# Patient Record
Sex: MALE | Race: WHITE | HISPANIC OR LATINO | Employment: UNEMPLOYED | URBAN - METROPOLITAN AREA
[De-identification: names, ages, dates, MRNs, and addresses within clinical notes are randomized per-mention and may not be internally consistent; named-entity substitution may affect disease eponyms.]

---

## 2021-04-25 ENCOUNTER — APPOINTMENT (OUTPATIENT)
Dept: RADIOLOGY | Facility: MEDICAL CENTER | Age: 66
End: 2021-04-25
Attending: STUDENT IN AN ORGANIZED HEALTH CARE EDUCATION/TRAINING PROGRAM
Payer: COMMERCIAL

## 2021-04-25 ENCOUNTER — HOSPITAL ENCOUNTER (OUTPATIENT)
Facility: MEDICAL CENTER | Age: 66
End: 2021-05-13
Attending: EMERGENCY MEDICINE | Admitting: INTERNAL MEDICINE
Payer: COMMERCIAL

## 2021-04-25 ENCOUNTER — APPOINTMENT (OUTPATIENT)
Dept: RADIOLOGY | Facility: MEDICAL CENTER | Age: 66
End: 2021-04-25
Attending: EMERGENCY MEDICINE
Payer: COMMERCIAL

## 2021-04-25 DIAGNOSIS — L03.119 CELLULITIS OF LOWER EXTREMITY, UNSPECIFIED LATERALITY: ICD-10-CM

## 2021-04-25 DIAGNOSIS — G20.A1 PARKINSON DISEASE (HCC): ICD-10-CM

## 2021-04-25 DIAGNOSIS — M19.041 PRIMARY OSTEOARTHRITIS OF BOTH HANDS: ICD-10-CM

## 2021-04-25 DIAGNOSIS — M19.042 PRIMARY OSTEOARTHRITIS OF BOTH HANDS: ICD-10-CM

## 2021-04-25 DIAGNOSIS — I87.2 VENOUS INSUFFICIENCY OF BOTH LOWER EXTREMITIES: ICD-10-CM

## 2021-04-25 DIAGNOSIS — R62.7 FAILURE TO THRIVE IN ADULT: ICD-10-CM

## 2021-04-25 PROBLEM — M79.641 BILATERAL HAND PAIN: Status: ACTIVE | Noted: 2021-04-25

## 2021-04-25 PROBLEM — Z59.00 HOMELESS: Status: ACTIVE | Noted: 2021-04-25

## 2021-04-25 PROBLEM — M79.642 BILATERAL HAND PAIN: Status: ACTIVE | Noted: 2021-04-25

## 2021-04-25 PROBLEM — E11.9 DM (DIABETES MELLITUS) (HCC): Status: ACTIVE | Noted: 2021-04-25

## 2021-04-25 PROBLEM — I87.8 VENOUS STASIS: Status: ACTIVE | Noted: 2021-04-25

## 2021-04-25 LAB
ALBUMIN SERPL BCP-MCNC: 4.8 G/DL (ref 3.2–4.9)
ALBUMIN/GLOB SERPL: 1.3 G/DL
ALP SERPL-CCNC: 105 U/L (ref 30–99)
ALT SERPL-CCNC: 7 U/L (ref 2–50)
AMPHET UR QL SCN: NEGATIVE
ANION GAP SERPL CALC-SCNC: 11 MMOL/L (ref 7–16)
APPEARANCE UR: CLEAR
AST SERPL-CCNC: 24 U/L (ref 12–45)
B-OH-BUTYR SERPL-MCNC: 0.48 MMOL/L (ref 0.02–0.27)
BACTERIA #/AREA URNS HPF: NEGATIVE /HPF
BARBITURATES UR QL SCN: NEGATIVE
BASOPHILS # BLD AUTO: 1.2 % (ref 0–1.8)
BASOPHILS # BLD: 0.11 K/UL (ref 0–0.12)
BENZODIAZ UR QL SCN: NEGATIVE
BILIRUB SERPL-MCNC: 0.5 MG/DL (ref 0.1–1.5)
BILIRUB UR QL STRIP.AUTO: NEGATIVE
BUN SERPL-MCNC: 36 MG/DL (ref 8–22)
BZE UR QL SCN: NEGATIVE
CALCIUM SERPL-MCNC: 9.8 MG/DL (ref 8.5–10.5)
CANNABINOIDS UR QL SCN: NEGATIVE
CHLORIDE SERPL-SCNC: 96 MMOL/L (ref 96–112)
CO2 SERPL-SCNC: 27 MMOL/L (ref 20–33)
COLOR UR: YELLOW
CREAT SERPL-MCNC: 1.02 MG/DL (ref 0.5–1.4)
CRP SERPL HS-MCNC: <0.3 MG/DL (ref 0–0.75)
EKG IMPRESSION: NORMAL
EOSINOPHIL # BLD AUTO: 0.27 K/UL (ref 0–0.51)
EOSINOPHIL NFR BLD: 3 % (ref 0–6.9)
EPI CELLS #/AREA URNS HPF: NEGATIVE /HPF
ERYTHROCYTE [DISTWIDTH] IN BLOOD BY AUTOMATED COUNT: 41.1 FL (ref 35.9–50)
ERYTHROCYTE [SEDIMENTATION RATE] IN BLOOD BY WESTERGREN METHOD: 2 MM/HOUR (ref 0–20)
EST. AVERAGE GLUCOSE BLD GHB EST-MCNC: 148 MG/DL
ETHANOL BLD-MCNC: <10.1 MG/DL (ref 0–10)
GLOBULIN SER CALC-MCNC: 3.6 G/DL (ref 1.9–3.5)
GLUCOSE SERPL-MCNC: 117 MG/DL (ref 65–99)
GLUCOSE UR STRIP.AUTO-MCNC: NEGATIVE MG/DL
HBA1C MFR BLD: 6.8 % (ref 4–5.6)
HCT VFR BLD AUTO: 46.8 % (ref 42–52)
HGB BLD-MCNC: 15.2 G/DL (ref 14–18)
IMM GRANULOCYTES # BLD AUTO: 0.02 K/UL (ref 0–0.11)
IMM GRANULOCYTES NFR BLD AUTO: 0.2 % (ref 0–0.9)
KETONES UR STRIP.AUTO-MCNC: NEGATIVE MG/DL
LACTATE BLD-SCNC: 1.4 MMOL/L (ref 0.5–2)
LEUKOCYTE ESTERASE UR QL STRIP.AUTO: NEGATIVE
LYMPHOCYTES # BLD AUTO: 1.93 K/UL (ref 1–4.8)
LYMPHOCYTES NFR BLD: 21.8 % (ref 22–41)
MAGNESIUM SERPL-MCNC: 2.3 MG/DL (ref 1.5–2.5)
MCH RBC QN AUTO: 28.5 PG (ref 27–33)
MCHC RBC AUTO-ENTMCNC: 32.5 G/DL (ref 33.7–35.3)
MCV RBC AUTO: 87.6 FL (ref 81.4–97.8)
METHADONE UR QL SCN: NEGATIVE
MICRO URNS: ABNORMAL
MONOCYTES # BLD AUTO: 0.46 K/UL (ref 0–0.85)
MONOCYTES NFR BLD AUTO: 5.2 % (ref 0–13.4)
NEUTROPHILS # BLD AUTO: 6.07 K/UL (ref 1.82–7.42)
NEUTROPHILS NFR BLD: 68.6 % (ref 44–72)
NITRITE UR QL STRIP.AUTO: NEGATIVE
NRBC # BLD AUTO: 0 K/UL
NRBC BLD-RTO: 0 /100 WBC
NT-PROBNP SERPL IA-MCNC: 10 PG/ML (ref 0–125)
OPIATES UR QL SCN: NEGATIVE
OXYCODONE UR QL SCN: NEGATIVE
PCP UR QL SCN: NEGATIVE
PH UR STRIP.AUTO: 7 [PH] (ref 5–8)
PHOSPHATE SERPL-MCNC: 3.6 MG/DL (ref 2.5–4.5)
PLATELET # BLD AUTO: 177 K/UL (ref 164–446)
PMV BLD AUTO: 9.7 FL (ref 9–12.9)
POTASSIUM SERPL-SCNC: 4 MMOL/L (ref 3.6–5.5)
PROPOXYPH UR QL SCN: NEGATIVE
PROT SERPL-MCNC: 8.4 G/DL (ref 6–8.2)
PROT UR QL STRIP: 100 MG/DL
RBC # BLD AUTO: 5.34 M/UL (ref 4.7–6.1)
RBC # URNS HPF: ABNORMAL /HPF
RBC UR QL AUTO: NEGATIVE
SODIUM SERPL-SCNC: 134 MMOL/L (ref 135–145)
SP GR UR STRIP.AUTO: 1.02
TROPONIN T SERPL-MCNC: 21 NG/L (ref 6–19)
UROBILINOGEN UR STRIP.AUTO-MCNC: 0.2 MG/DL
WBC # BLD AUTO: 8.9 K/UL (ref 4.8–10.8)
WBC #/AREA URNS HPF: ABNORMAL /HPF

## 2021-04-25 PROCEDURE — 96375 TX/PRO/DX INJ NEW DRUG ADDON: CPT

## 2021-04-25 PROCEDURE — 83735 ASSAY OF MAGNESIUM: CPT

## 2021-04-25 PROCEDURE — 93005 ELECTROCARDIOGRAM TRACING: CPT

## 2021-04-25 PROCEDURE — 80307 DRUG TEST PRSMV CHEM ANLYZR: CPT

## 2021-04-25 PROCEDURE — 73130 X-RAY EXAM OF HAND: CPT | Mod: RT

## 2021-04-25 PROCEDURE — 83036 HEMOGLOBIN GLYCOSYLATED A1C: CPT

## 2021-04-25 PROCEDURE — G0378 HOSPITAL OBSERVATION PER HR: HCPCS

## 2021-04-25 PROCEDURE — 80053 COMPREHEN METABOLIC PANEL: CPT

## 2021-04-25 PROCEDURE — 93970 EXTREMITY STUDY: CPT

## 2021-04-25 PROCEDURE — 84100 ASSAY OF PHOSPHORUS: CPT

## 2021-04-25 PROCEDURE — 83880 ASSAY OF NATRIURETIC PEPTIDE: CPT

## 2021-04-25 PROCEDURE — 85025 COMPLETE CBC W/AUTO DIFF WBC: CPT

## 2021-04-25 PROCEDURE — U0003 INFECTIOUS AGENT DETECTION BY NUCLEIC ACID (DNA OR RNA); SEVERE ACUTE RESPIRATORY SYNDROME CORONAVIRUS 2 (SARS-COV-2) (CORONAVIRUS DISEASE [COVID-19]), AMPLIFIED PROBE TECHNIQUE, MAKING USE OF HIGH THROUGHPUT TECHNOLOGIES AS DESCRIBED BY CMS-2020-01-R: HCPCS

## 2021-04-25 PROCEDURE — 700105 HCHG RX REV CODE 258: Performed by: EMERGENCY MEDICINE

## 2021-04-25 PROCEDURE — 82077 ASSAY SPEC XCP UR&BREATH IA: CPT

## 2021-04-25 PROCEDURE — 87040 BLOOD CULTURE FOR BACTERIA: CPT

## 2021-04-25 PROCEDURE — 93005 ELECTROCARDIOGRAM TRACING: CPT | Performed by: EMERGENCY MEDICINE

## 2021-04-25 PROCEDURE — 99220 PR INITIAL OBSERVATION CARE,LEVL III: CPT | Mod: GC | Performed by: INTERNAL MEDICINE

## 2021-04-25 PROCEDURE — 83605 ASSAY OF LACTIC ACID: CPT

## 2021-04-25 PROCEDURE — 73130 X-RAY EXAM OF HAND: CPT | Mod: LT

## 2021-04-25 PROCEDURE — 99285 EMERGENCY DEPT VISIT HI MDM: CPT

## 2021-04-25 PROCEDURE — 700111 HCHG RX REV CODE 636 W/ 250 OVERRIDE (IP): Performed by: EMERGENCY MEDICINE

## 2021-04-25 PROCEDURE — 86140 C-REACTIVE PROTEIN: CPT

## 2021-04-25 PROCEDURE — 71045 X-RAY EXAM CHEST 1 VIEW: CPT

## 2021-04-25 PROCEDURE — U0005 INFEC AGEN DETEC AMPLI PROBE: HCPCS

## 2021-04-25 PROCEDURE — 81001 URINALYSIS AUTO W/SCOPE: CPT | Mod: XU

## 2021-04-25 PROCEDURE — 84484 ASSAY OF TROPONIN QUANT: CPT

## 2021-04-25 PROCEDURE — 700102 HCHG RX REV CODE 250 W/ 637 OVERRIDE(OP): Performed by: EMERGENCY MEDICINE

## 2021-04-25 PROCEDURE — 82010 KETONE BODYS QUAN: CPT

## 2021-04-25 PROCEDURE — 96365 THER/PROPH/DIAG IV INF INIT: CPT

## 2021-04-25 PROCEDURE — A9270 NON-COVERED ITEM OR SERVICE: HCPCS | Performed by: EMERGENCY MEDICINE

## 2021-04-25 PROCEDURE — 36415 COLL VENOUS BLD VENIPUNCTURE: CPT

## 2021-04-25 PROCEDURE — 85652 RBC SED RATE AUTOMATED: CPT

## 2021-04-25 RX ORDER — AMOXICILLIN 250 MG
2 CAPSULE ORAL 2 TIMES DAILY
Status: DISCONTINUED | OUTPATIENT
Start: 2021-04-25 | End: 2021-05-14 | Stop reason: HOSPADM

## 2021-04-25 RX ORDER — ASPIRIN 81 MG/1
81 TABLET ORAL DAILY
COMMUNITY
End: 2021-08-01

## 2021-04-25 RX ORDER — ROSUVASTATIN CALCIUM 20 MG/1
40 TABLET, COATED ORAL DAILY
COMMUNITY
End: 2021-08-01

## 2021-04-25 RX ORDER — ACETAMINOPHEN 325 MG/1
650 TABLET ORAL ONCE
Status: COMPLETED | OUTPATIENT
Start: 2021-04-25 | End: 2021-04-25

## 2021-04-25 RX ORDER — LABETALOL HYDROCHLORIDE 5 MG/ML
10 INJECTION, SOLUTION INTRAVENOUS EVERY 4 HOURS PRN
Status: DISCONTINUED | OUTPATIENT
Start: 2021-04-25 | End: 2021-05-09

## 2021-04-25 RX ORDER — TAMSULOSIN HYDROCHLORIDE 0.4 MG/1
0.4 CAPSULE ORAL DAILY
COMMUNITY
End: 2021-08-01

## 2021-04-25 RX ORDER — GABAPENTIN 300 MG/1
300 CAPSULE ORAL DAILY
COMMUNITY
End: 2021-08-01

## 2021-04-25 RX ORDER — POLYETHYLENE GLYCOL 3350 17 G/17G
1 POWDER, FOR SOLUTION ORAL
Status: DISCONTINUED | OUTPATIENT
Start: 2021-04-25 | End: 2021-05-14 | Stop reason: HOSPADM

## 2021-04-25 RX ORDER — BISACODYL 10 MG
10 SUPPOSITORY, RECTAL RECTAL
Status: DISCONTINUED | OUTPATIENT
Start: 2021-04-25 | End: 2021-05-14 | Stop reason: HOSPADM

## 2021-04-25 RX ORDER — SODIUM CHLORIDE, SODIUM LACTATE, POTASSIUM CHLORIDE, CALCIUM CHLORIDE 600; 310; 30; 20 MG/100ML; MG/100ML; MG/100ML; MG/100ML
1000 INJECTION, SOLUTION INTRAVENOUS ONCE
Status: COMPLETED | OUTPATIENT
Start: 2021-04-25 | End: 2021-04-25

## 2021-04-25 RX ORDER — ACETAMINOPHEN 325 MG/1
650 TABLET ORAL EVERY 6 HOURS PRN
Status: DISCONTINUED | OUTPATIENT
Start: 2021-04-25 | End: 2021-04-30

## 2021-04-25 RX ORDER — LORAZEPAM 2 MG/ML
1 INJECTION INTRAMUSCULAR ONCE
Status: DISCONTINUED | OUTPATIENT
Start: 2021-04-25 | End: 2021-04-25

## 2021-04-25 RX ORDER — VITAMIN B COMPLEX
5000 TABLET ORAL DAILY
Status: ON HOLD | COMMUNITY
End: 2021-05-13

## 2021-04-25 RX ORDER — CARBIDOPA/LEVODOPA 25MG-250MG
1 TABLET ORAL 4 TIMES DAILY
COMMUNITY
End: 2021-08-01

## 2021-04-25 RX ORDER — MELOXICAM 7.5 MG/1
7.5-15 TABLET ORAL
Status: ON HOLD | COMMUNITY
End: 2021-05-13

## 2021-04-25 RX ORDER — LORAZEPAM 2 MG/ML
1 INJECTION INTRAMUSCULAR ONCE
Status: COMPLETED | OUTPATIENT
Start: 2021-04-25 | End: 2021-04-25

## 2021-04-25 RX ORDER — DEXTROSE MONOHYDRATE 25 G/50ML
50 INJECTION, SOLUTION INTRAVENOUS
Status: DISCONTINUED | OUTPATIENT
Start: 2021-04-25 | End: 2021-04-26

## 2021-04-25 RX ORDER — FUROSEMIDE 40 MG/1
40 TABLET ORAL DAILY
Status: ON HOLD | COMMUNITY
End: 2021-05-13

## 2021-04-25 RX ADMIN — LORAZEPAM 1 MG: 2 INJECTION INTRAMUSCULAR; INTRAVENOUS at 11:19

## 2021-04-25 RX ADMIN — ACETAMINOPHEN 650 MG: 325 TABLET ORAL at 09:03

## 2021-04-25 RX ADMIN — SODIUM CHLORIDE, POTASSIUM CHLORIDE, SODIUM LACTATE AND CALCIUM CHLORIDE 1000 ML: 600; 310; 30; 20 INJECTION, SOLUTION INTRAVENOUS at 10:28

## 2021-04-25 RX ADMIN — SODIUM CHLORIDE 3 G: 900 INJECTION INTRAVENOUS at 11:47

## 2021-04-25 ASSESSMENT — ENCOUNTER SYMPTOMS
FLANK PAIN: 0
ORTHOPNEA: 0
WEAKNESS: 0
CLAUDICATION: 0
PHOTOPHOBIA: 0
TINGLING: 0
DOUBLE VISION: 0
DEPRESSION: 0
BACK PAIN: 0
WEIGHT LOSS: 0
HEARTBURN: 0
TREMORS: 0
HEADACHES: 0
FEVER: 0
BLURRED VISION: 0
SHORTNESS OF BREATH: 0
ABDOMINAL PAIN: 0
FALLS: 0
NERVOUS/ANXIOUS: 0
HEMOPTYSIS: 0
MYALGIAS: 0
SEIZURES: 0
HALLUCINATIONS: 0
WHEEZING: 0
DIAPHORESIS: 0
MEMORY LOSS: 0
NAUSEA: 0
FOCAL WEAKNESS: 0
PALPITATIONS: 0
DIZZINESS: 0
COUGH: 0
VOMITING: 0
NECK PAIN: 0
DIARRHEA: 0
CHILLS: 0
SENSORY CHANGE: 0
BLOOD IN STOOL: 0
SPEECH CHANGE: 0
SPUTUM PRODUCTION: 0
LOSS OF CONSCIOUSNESS: 0
INSOMNIA: 0
CONSTIPATION: 0

## 2021-04-25 ASSESSMENT — COGNITIVE AND FUNCTIONAL STATUS - GENERAL
DAILY ACTIVITIY SCORE: 19
SUGGESTED CMS G CODE MODIFIER DAILY ACTIVITY: CK
MOBILITY SCORE: 16
MOVING TO AND FROM BED TO CHAIR: A LITTLE
TOILETING: A LITTLE
DRESSING REGULAR UPPER BODY CLOTHING: A LITTLE
DRESSING REGULAR LOWER BODY CLOTHING: A LITTLE
CLIMB 3 TO 5 STEPS WITH RAILING: A LITTLE
HELP NEEDED FOR BATHING: A LITTLE
WALKING IN HOSPITAL ROOM: A LITTLE
SUGGESTED CMS G CODE MODIFIER MOBILITY: CK
STANDING UP FROM CHAIR USING ARMS: A LOT
PERSONAL GROOMING: A LITTLE
MOVING FROM LYING ON BACK TO SITTING ON SIDE OF FLAT BED: A LOT
TURNING FROM BACK TO SIDE WHILE IN FLAT BAD: A LITTLE

## 2021-04-25 ASSESSMENT — LIFESTYLE VARIABLES
SUBSTANCE_ABUSE: 0
HAVE PEOPLE ANNOYED YOU BY CRITICIZING YOUR DRINKING: NO
HOW MANY TIMES IN THE PAST YEAR HAVE YOU HAD 5 OR MORE DRINKS IN A DAY: 0
CONSUMPTION TOTAL: NEGATIVE
TOTAL SCORE: 0
ALCOHOL_USE: NO
TOTAL SCORE: 0
DOES PATIENT WANT TO STOP DRINKING: NO
ON A TYPICAL DAY WHEN YOU DRINK ALCOHOL HOW MANY DRINKS DO YOU HAVE: 0
EVER HAD A DRINK FIRST THING IN THE MORNING TO STEADY YOUR NERVES TO GET RID OF A HANGOVER: NO
HAVE YOU EVER FELT YOU SHOULD CUT DOWN ON YOUR DRINKING: NO
TOTAL SCORE: 0
AVERAGE NUMBER OF DAYS PER WEEK YOU HAVE A DRINK CONTAINING ALCOHOL: 0
EVER FELT BAD OR GUILTY ABOUT YOUR DRINKING: NO

## 2021-04-25 ASSESSMENT — PATIENT HEALTH QUESTIONNAIRE - PHQ9
2. FEELING DOWN, DEPRESSED, IRRITABLE, OR HOPELESS: NOT AT ALL
1. LITTLE INTEREST OR PLEASURE IN DOING THINGS: NOT AT ALL
SUM OF ALL RESPONSES TO PHQ9 QUESTIONS 1 AND 2: 0

## 2021-04-25 ASSESSMENT — PAIN DESCRIPTION - PAIN TYPE
TYPE: CHRONIC PAIN
TYPE: CHRONIC PAIN

## 2021-04-25 NOTE — PROGRESS NOTES
"Patient arrived to floor via \[facility]\"".   Patient is A&Ox4.  Assessment complete and POC discussed.   2 RN skin check and admit profile completed.  Patient is A&Ox4, VSS, on RA.   Patient reports 2/10 pain in hands and feet upon assessment, although states it can \"shoot up to a 10/10\" during ambulation (numbness/tingling).  Patient is a standby assist with front wheel walker.  Patient reports being homeless and [place]  Bed is in lowest/locked position.   Call light and belongings are within reach.   No further needs at this time.    1500: Patient's sister, Haley called to discuss patient's case. Patient OK'd this RN to talk with sister. Haley states patient is not homeless and that he lives with a brother in [place] and has been there since December. Haley states patient had a fall three years ago and suffered from a spinal or neck injury and states he has cognitive deficits. States he sees Dr. Kathy Steve at [facility]. Will pass along to social work. Contact for Haley in chart.  "

## 2021-04-25 NOTE — ED NOTES
UDS add-on to prior UA collection. Blood cultures sent to lab. IV abx started. Hospitalist at bedside. Call light in reach.

## 2021-04-25 NOTE — ASSESSMENT & PLAN NOTE
-Negative BNP, negative DVT ultrasound, strong pulses on exam, negative ESR and CRP.  -Chronic and ongoing for years.   -Only trace edema on exam.   -Continue compression stockings and elevation.

## 2021-04-25 NOTE — ED PROVIDER NOTES
"ED Provider Note    CHIEF COMPLAINT  Chief Complaint   Patient presents with   • Leg Swelling     BLE swelling, chronic x1 year with pain   • Weakness     generalized       HPI  Dinah Mathur is a 65 y.o. male with a history of type 2 diabetes mellitus who presents with complaints of increased pain and swelling to his lower extremities. Patient recently relocated to this area from [place] about 2 months ago. He said he hitchhiked here, and has been living on the streets. He has been taking his Metformin. He has been having swelling to his lower extremities for over 1 year, and says he was told he had \"edema\" while he was in [place]. He denies any fever, chills, sore throat, productive cough, congestion, any difficulty breathing. He has had no headache, chest pain, back pain, abdominal pain. He denies any numbness or tingling to the extremities. The patient has no family in the area.    REVIEW OF SYSTEMS  See HPI for further details. All other systems are negative.     PAST MEDICAL HISTORY  Past Medical History:   Diagnosis Date   • Diabetes (HCC)        FAMILY HISTORY  History reviewed. No pertinent family history.    SOCIAL HISTORY  Social History     Tobacco Use   • Smoking status: Never Smoker   • Smokeless tobacco: Never Used   Substance Use Topics   • Alcohol use: Never   • Drug use: Never      Social History     Substance and Sexual Activity   Drug Use Never       SURGICAL HISTORY  History reviewed. No pertinent surgical history.    CURRENT MEDICATIONS  Home Medications     Reviewed by Cherry Hilsl (Pharmacy Tech) on 04/25/21 at 1223  Med List Status: Complete    Medication Last Dose Status    metFORMIN (GLUCOPHAGE) 500 MG Tab 4/25/2021 Active                ALLERGIES  No Known Allergies    PHYSICAL EXAM0  VITAL SIGNS: Blood Pressure 119/79   Pulse 78   Temperature 36.4 °C (97.6 °F) (Temporal)   Respiration 20   Height 1.753 m (5' 9\")   Weight 79.4 kg (175 lb)   Oxygen Saturation 96%   Body " Mass Index 25.84 kg/m²   Constitutional: Awake, alert, in no acute distress, Non-toxic appearance.   HENT: Atraumatic. Bilateral external ears normal, mucous membranes moist, throat nonerythematous without exudates, nose is normal.  Eyes: PERRL, EOMI, conjunctiva moist, noninjected.  Neck: Nontender, Normal range of motion, No nuchal rigidity, No stridor.   Lymphatic: No lymphadenopathy noted.   Cardiovascular: Regular rate and rhythm, no murmurs, rubs, gallops.  Thorax & Lungs:  Good breath sounds bilaterally, no wheezes, rales, or retractions.  No chest tenderness.  Abdomen: Bowel sounds normal, Soft, nontender, nondistended, no rebound, guarding, masses.  Back: No CVA or spinal tenderness.  Extremities: Intact distal pulses, there is +3 edema to the lower extremities.  There are some chronic skin changes, with some increased warmth, and darkened erythema noted to the lower extremities below the knees.  Skin: Warm, Dry, No rashes, erythema increased warmth with some induration noted to the lower extremities below the knees.   Musculoskeletal: No joint swelling or tenderness.  Neurologic: Alert & oriented x 3, cranial nerves II through XII intact, sensory and motor function normal. No focal deficits.   Psychiatric: Affect normal, Judgment normal, Mood normal.     EKG  EKG Interpretation:  Interpreted by me    Rhythm:  Normal sinus rhythm  Rate: 85  Intervals: Normal  Axis: normal  Ectopy: none  Conduction: normal  ST Segments: no evidence of elevation or depression  T Waves: no acute change  Q Waves: none  Clinical Impression: No acute injury or ischemic pattern.   Comparison to previous EKG: None available      LABS  Labs Reviewed   CBC WITH DIFFERENTIAL - Abnormal; Notable for the following components:       Result Value    MCHC 32.5 (*)     Lymphocytes 21.80 (*)     All other components within normal limits   URINALYSIS,CULTURE IF INDICATED - Abnormal; Notable for the following components:    Protein 100 (*)      "All other components within normal limits    Narrative:     Indication for culture:->Patient WITHOUT an indwelling Carl  catheter in place with new onset of Dysuria, Frequency,  Urgency, and/or Suprapubic pain   HEMOGLOBIN A1C - Abnormal; Notable for the following components:    Glycohemoglobin 6.8 (*)     All other components within normal limits   BETA-HYDROXYBUTYRIC ACID - Abnormal; Notable for the following components:    beta-Hydroxybutyric Acid 0.48 (*)     All other components within normal limits   TROPONIN - Abnormal; Notable for the following components:    Troponin T 21 (*)     All other components within normal limits   COMP METABOLIC PANEL - Abnormal; Notable for the following components:    Sodium 134 (*)     Glucose 117 (*)     Bun 36 (*)     Alkaline Phosphatase 105 (*)     Total Protein 8.4 (*)     Globulin 3.6 (*)     All other components within normal limits   URINE MICROSCOPIC (W/UA) - Abnormal; Notable for the following components:    WBC Rare (*)     All other components within normal limits    Narrative:     Indication for culture:->Patient WITHOUT an indwelling Carl  catheter in place with new onset of Dysuria, Frequency,  Urgency, and/or Suprapubic pain   PHOSPHORUS   MAGNESIUM   DIAGNOSTIC ALCOHOL   PROBRAIN NATRIURETIC PEPTIDE, NT   ESTIMATED GFR   SED RATE   CRP QUANTITIVE (NON-CARDIAC)   LACTIC ACID   BLOOD CULTURE    Narrative:     1 of 2 for Blood Culture x 2 sites order. Per Hospital  Policy: Only change Specimen Src: to \"Line\" if specified by  physician order.   BLOOD CULTURE    Narrative:     2 of 2 blood culture x2  Sites order. Per Hospital Policy:  Only change Specimen Src: to \"Line\" if specified by physician  order.   URINE DRUG SCREEN   SARS-COV-2, PCR (IN-HOUSE)    Narrative:     Have you been in close contact with a person who is suspected  or known to be positive for COVID-19 within the last 30 days  (e.g. last seen that person < 30 days ago)->No   LACTIC ACID   LACTIC " ACID       All labs reviewed by me.      RADIOLOGY/PROCEDURES  US-EXTREMITY VENOUS LOWER BILAT   Final Result      DX-CHEST-PORTABLE (1 VIEW)   Final Result      No acute cardiopulmonary process is seen.          The radiologist's interpretations of all radiological studies have been reviewed by me.        COURSE & MEDICAL DECISION MAKING  Pertinent Labs & Imaging studies reviewed. (See chart for details)  The patient presents with the above complaints.  He appears to have a developing cellulitis to the lower extremities.  He is breathing comfortably, denies any chest pain or shortness of breath.  Oxygen saturation is good at 96% on room air.  Patient was given a dose of Tylenol for his leg pain.  EKG shows normal sinus. Chest x-ray shows some hyperexpansion but no acute infiltrate or effusion.  Ultrasound of the lower extremities preliminary shows no acute DVT, does show interstitial fluid consistent with edema in the thigh and below the knee.    CBC showed a white count of 8900, hemoglobin 15.2, platelets 177, normal differential, sedimentation rate 2, chemistry shows sodium 134, glucose 117, BUN 36, creatinine 1.06, liver function test normal, troponin less than 10, lactic acid 1.4, urinalysis shows 100 protein, otherwise negative.  C-reactive protein less than 0.30.  Findings were discussed with the patient.  He will be treated for possible cellulitis.  He received a dose of Unasyn here in the emergency department.  Case was discussed with the hospitalist for admission.      FINAL IMPRESSION  1. Cellulitis of lower extremity, unspecified laterality          Electronically signed by: Richard Packer M.D., 4/25/2021 8:39 AM

## 2021-04-25 NOTE — ED NOTES
Pt restless, wanting to get out of bed. Pt attempted to ambulate prior, although unable to stand independently. Pt sat back down onto bed. Pt educated on need to stay in bed currently. Pt verbalized understanding. Will admin 1 mg Ativan.

## 2021-04-25 NOTE — ED NOTES
Patient transported to floor via gurney in stable condition accompanied by transport. Al belongings accounted for including home walker.

## 2021-04-25 NOTE — PROGRESS NOTES
2 RN skin check completed with DASIA Hoover.   Devices in place: PIV.  Skin assessed under devices: N/A.  Confirmed pressure ulcers found: N/A.  New potential pressure ulcers noted: N/A.  Wound consult placed:  N/A.    Skin assessment:  General: dry, flaky  Ears: red, slow to jaya - took off patient's surgical mask  Elbows: pink, blanching  Sacrum: intact, pink, blanching; small opening on right upper cheek - barrier cream applied and image uploaded to media  Groin: intact  BLE; red, george  Heels: pink, boggy, blanching

## 2021-04-25 NOTE — NON-PROVIDER
"Internal Medicine Medical Student Note  Note Author: Velma Sol, Student    Name Dinah Mathur 1955   Age/Sex 65 y.o. male   MRN [id number]   Code Status FULL     Chief Complaint: \"Some woman called 911 on me\"  HPI:  Patient was walking by an apartment complex when a woman saw him and thought that he is \"up to no good\" and called 911. Ambulance came and took him to hospital for a presumable leg weakness and cellulitis. Patient does use a walker to ambulate, and according to him his legs are in normal for him state. His legs swell when he is up and swelling usually goes down after laying down and rising his legs up. He does have pain in his hands and legs that he is rating at 4/10.      He denies any SOB, chest pain, palpitations, syncope.    PMH: Diabetes  Medications: Metformin  Allergies: NKA  Surgeries: None  Hospitalizations: Last year he hit his head and was hospitalized. No hemorrhage reported    FAMx: Unknown    SOCx: Currently homeless, does not work, 9 pack year history (quit 35 years ago), does not drink alcohol since his fall last year, denies any drug use.    ROS:  Constitutional: Denies chills, fever, night sweats, malaise/fatigue, nausea, vomiting or unplanned weight loss  HEENT: Denies masses in throat. Denies changes in hearing. Denies blurred or double vision  Cardio: Denies chest pain, palpitations, or edema  Pulm: Denies dyspnea, cough, or wheezing  GI: Denies abd pain, diarrhea or constipation  : Denies dysuria, hematuria or changes in frequency/urgency  Skin: Denies rash  MSK: Denies pain in neck, back. Has pain in hands and feet.  Neuro: Denies dizziness or syncope  Psych: Reports good affect and no depression. Denies suicidal or homicidal ideations      Vitals:    21 0901 21 0902 21 1000 21 1058   BP:  117/91  139/84   Pulse:  81 79 82   Resp:    Temp:       TempSrc:       SpO2:  96% 94% 95%   Weight:       Height:         Body mass index is " 25.84 kg/m². Weight: 79.4 kg (175 lb)  Oxygen Therapy:  Pulse Oximetry: 95 %, O2 Delivery Device: None - Room Air      Physical Exam  Gen: A&OX4, well appearing, NAD, lying comfortably in bed  HEENT: Normocephalic, atraumatic, PERRLA, EOMI, MMM, neck supple, no LAD, thyroid nonpalpable  Cardio: RRR. No rubs, murmurs or gallops. No JVD  Pulm: CTAB. No wheezes, rales, or rhonchi  Abd: BS present in all 4 quadrants, soft, nontender, no distension  MSK: normal bulk and tone. Normal ROM in all 4 ext  Skin: no rashes or lesions observed  Neuro: CN 2-12 grossly intact, no focal deficits  Psych: Normal affect, responds and interacts appropriately    Labs/Imaging:  Recent Labs     04/25/21  0825   WBC 8.9   RBC 5.34   HEMOGLOBIN 15.2   HEMATOCRIT 46.8   MCV 87.6   MCH 28.5   RDW 41.1   PLATELETCT 177   MPV 9.7   NEUTSPOLYS 68.60   LYMPHOCYTES 21.80*   MONOCYTES 5.20   EOSINOPHILS 3.00   BASOPHILS 1.20     Recent Labs     04/25/21  0825   SODIUM 134*   POTASSIUM 4.0   CHLORIDE 96   CO2 27   GLUCOSE 117*   BUN 36*     Recent Labs     04/25/21  0825   ALBUMIN 4.8   TBILIRUBIN 0.5   ALKPHOSPHAT 105*   TOTPROTEIN 8.4*   ALTSGPT 7   ASTSGOT 24   CREATININE 1.02   Results for ALBERT VEGA (MRN [id number]) as of 4/25/2021 12:30   Ref. Range 4/25/2021 11:49   Lactic Acid Latest Ref Range: 0.5 - 2.0 mmol/L 1.4     Results for ALBERT VEGA (MRN [id number]) as of 4/25/2021 12:30   Ref. Range 4/25/2021 08:25   Diagnostic Alcohol Latest Ref Range: 0.0 - 10.0 mg/dL <10.1   Troponin T Latest Ref Range: 6 - 19 ng/L 21 (H)   NT-proBNP Latest Ref Range: 0 - 125 pg/mL 10     Results for ALBERT VEGA (MRN [id number]) as of 4/25/2021 12:30   Ref. Range 4/25/2021 08:25   beta-Hydroxybutyric Acid Latest Ref Range: 0.02 - 0.27 mmol/L 0.48 (H)     Results for ALBERT VEGA (MRN [id number]) as of 4/25/2021 12:30   Ref. Range 4/25/2021 10:30   Color Unknown Yellow   Character Unknown Clear   Specific Gravity Latest Ref Range: <1.035  1.020   Ph  Latest Ref Range: 5.0 - 8.0  7.0   Glucose Latest Ref Range: Negative mg/dL Negative   Ketones Latest Ref Range: Negative mg/dL Negative   Bilirubin Latest Ref Range: Negative  Negative   Occult Blood Latest Ref Range: Negative  Negative   Protein Latest Ref Range: Negative mg/dL 100 (A)   Nitrite Latest Ref Range: Negative  Negative   Leukocyte Esterase Latest Ref Range: Negative  Negative   Urobilinogen, Urine Latest Ref Range: Negative  0.2   Micro Urine Req Unknown Microscopic   WBC Latest Units: /hpf Rare (A)   RBC Latest Units: /hpf Rare   Epithelial Cells Latest Units: /hpf Negative   Bacteria Latest Ref Range: None /hpf Negative     Results for ALBERT VEGA (MRN [id number]) as of 4/25/2021 12:30   Ref. Range 4/25/2021 08:25   Stat C-Reactive Protein Latest Ref Range: 0.00 - 0.75 mg/dL <0.30       US-LOWER EXTREMITY   Bilateral lower extremities -.    No evidence of deep venous thrombosis.    Complete color filling and compressibility with normal venous flow dynamics    including spontaneous flow, response to augmentation maneuvers, and    respiratory phasicity.       Interstitial fluid consistent with edema is observed in the thigh and below    the knee.     CXR:  No acute cardiopulmonary process is seen.    Medications:  Current Facility-Administered Medications   Medication Dose   • LORazepam (ATIVAN) injection 1 mg  1 mg     No current outpatient medications on file.       Assessment/Plan   Pt is a 65 y.o. male with bilateral pedal edema, weakness, and pain admitted for medical observation.    #Leg pain, edema, weakness  -PRN Tylenol for pain  -Consider pro-BNP to evaluate for heart failure

## 2021-04-25 NOTE — ED TRIAGE NOTES
"Dinah Mathur  Chief Complaint   Patient presents with   • Leg Swelling     BLE swelling, chronic x1 year with pain   • Weakness     generalized     Pt BIB [facility]. Pt homeless. Hx DM. Compliant with metformin. Initial call for cold exposure, then pt c/o leg pain/swelling. Pt has BLE leg swelling with lower leg discoloration. Pt c/o pain to legs. Pt uses walker. Pt reports generalized weakness.     Patient informed of triage process and to inform staff of any changes/worsening symptoms. Pt verbalized understanding. Pt denies concerns. Pt back to waiting room.     /87   Pulse 84   Temp 36.1 °C (97 °F) (Temporal)   Resp 13   Ht 1.753 m (5' 9\")   Wt 79.4 kg (175 lb)   SpO2 96%   "

## 2021-04-25 NOTE — ED NOTES
Attempt to help pt stand up using personal walker to collect urine sample. Pt only able to stand on feet for a couple of seconds, then sat back down. Pt back in bed, side rails up. Call light in reach. Given urinal to use while in bed.
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
## 2021-04-26 ENCOUNTER — APPOINTMENT (OUTPATIENT)
Dept: CARDIOLOGY | Facility: MEDICAL CENTER | Age: 66
End: 2021-04-26
Attending: STUDENT IN AN ORGANIZED HEALTH CARE EDUCATION/TRAINING PROGRAM
Payer: COMMERCIAL

## 2021-04-26 PROBLEM — M19.041 OSTEOARTHRITIS OF BOTH HANDS: Status: ACTIVE | Noted: 2021-04-25

## 2021-04-26 PROBLEM — I87.2 VENOUS STASIS DERMATITIS OF BOTH LOWER EXTREMITIES: Status: ACTIVE | Noted: 2021-04-25

## 2021-04-26 PROBLEM — Z59.00 HOMELESS: Status: RESOLVED | Noted: 2021-04-25 | Resolved: 2021-04-26

## 2021-04-26 PROBLEM — R62.7 FAILURE TO THRIVE IN ADULT: Status: ACTIVE | Noted: 2021-04-26

## 2021-04-26 PROBLEM — M19.042 OSTEOARTHRITIS OF BOTH HANDS: Status: ACTIVE | Noted: 2021-04-25

## 2021-04-26 PROBLEM — G20.A1 PARKINSON DISEASE (HCC): Status: ACTIVE | Noted: 2021-04-26

## 2021-04-26 LAB
ALBUMIN SERPL BCP-MCNC: 4.2 G/DL (ref 3.2–4.9)
ALBUMIN/GLOB SERPL: 1.4 G/DL
ALP SERPL-CCNC: 92 U/L (ref 30–99)
ALT SERPL-CCNC: 29 U/L (ref 2–50)
ANION GAP SERPL CALC-SCNC: 10 MMOL/L (ref 7–16)
AST SERPL-CCNC: 20 U/L (ref 12–45)
BASOPHILS # BLD AUTO: 0.7 % (ref 0–1.8)
BASOPHILS # BLD: 0.06 K/UL (ref 0–0.12)
BILIRUB SERPL-MCNC: 0.6 MG/DL (ref 0.1–1.5)
BUN SERPL-MCNC: 21 MG/DL (ref 8–22)
CALCIUM SERPL-MCNC: 9.3 MG/DL (ref 8.5–10.5)
CHLORIDE SERPL-SCNC: 105 MMOL/L (ref 96–112)
CO2 SERPL-SCNC: 25 MMOL/L (ref 20–33)
CREAT SERPL-MCNC: 0.91 MG/DL (ref 0.5–1.4)
EOSINOPHIL # BLD AUTO: 0.29 K/UL (ref 0–0.51)
EOSINOPHIL NFR BLD: 3.4 % (ref 0–6.9)
ERYTHROCYTE [DISTWIDTH] IN BLOOD BY AUTOMATED COUNT: 40.1 FL (ref 35.9–50)
GLOBULIN SER CALC-MCNC: 3 G/DL (ref 1.9–3.5)
GLUCOSE BLD-MCNC: 109 MG/DL (ref 65–99)
GLUCOSE SERPL-MCNC: 111 MG/DL (ref 65–99)
HCT VFR BLD AUTO: 44.1 % (ref 42–52)
HGB BLD-MCNC: 14.4 G/DL (ref 14–18)
IMM GRANULOCYTES # BLD AUTO: 0.03 K/UL (ref 0–0.11)
IMM GRANULOCYTES NFR BLD AUTO: 0.4 % (ref 0–0.9)
LYMPHOCYTES # BLD AUTO: 2.03 K/UL (ref 1–4.8)
LYMPHOCYTES NFR BLD: 24.1 % (ref 22–41)
MAGNESIUM SERPL-MCNC: 2.5 MG/DL (ref 1.5–2.5)
MCH RBC QN AUTO: 28 PG (ref 27–33)
MCHC RBC AUTO-ENTMCNC: 32.7 G/DL (ref 33.7–35.3)
MCV RBC AUTO: 85.6 FL (ref 81.4–97.8)
MONOCYTES # BLD AUTO: 0.62 K/UL (ref 0–0.85)
MONOCYTES NFR BLD AUTO: 7.4 % (ref 0–13.4)
NEUTROPHILS # BLD AUTO: 5.39 K/UL (ref 1.82–7.42)
NEUTROPHILS NFR BLD: 64 % (ref 44–72)
NRBC # BLD AUTO: 0 K/UL
NRBC BLD-RTO: 0 /100 WBC
PLATELET # BLD AUTO: 191 K/UL (ref 164–446)
PMV BLD AUTO: 9.6 FL (ref 9–12.9)
POTASSIUM SERPL-SCNC: 4 MMOL/L (ref 3.6–5.5)
PROT SERPL-MCNC: 7.2 G/DL (ref 6–8.2)
RBC # BLD AUTO: 5.15 M/UL (ref 4.7–6.1)
SARS-COV-2 RNA RESP QL NAA+PROBE: NOTDETECTED
SODIUM SERPL-SCNC: 140 MMOL/L (ref 135–145)
SPECIMEN SOURCE: NORMAL
WBC # BLD AUTO: 8.4 K/UL (ref 4.8–10.8)

## 2021-04-26 PROCEDURE — 99224 PR SUBSEQUENT OBSERVATION CARE,LEVEL I: CPT | Mod: GC | Performed by: INTERNAL MEDICINE

## 2021-04-26 PROCEDURE — 700102 HCHG RX REV CODE 250 W/ 637 OVERRIDE(OP): Performed by: STUDENT IN AN ORGANIZED HEALTH CARE EDUCATION/TRAINING PROGRAM

## 2021-04-26 PROCEDURE — 83735 ASSAY OF MAGNESIUM: CPT

## 2021-04-26 PROCEDURE — 306311 ANTI-EMBOLISM STOCKINGS XXLRG LNG: Performed by: INTERNAL MEDICINE

## 2021-04-26 PROCEDURE — 97165 OT EVAL LOW COMPLEX 30 MIN: CPT

## 2021-04-26 PROCEDURE — 82962 GLUCOSE BLOOD TEST: CPT

## 2021-04-26 PROCEDURE — A9270 NON-COVERED ITEM OR SERVICE: HCPCS | Performed by: STUDENT IN AN ORGANIZED HEALTH CARE EDUCATION/TRAINING PROGRAM

## 2021-04-26 PROCEDURE — 96372 THER/PROPH/DIAG INJ SC/IM: CPT

## 2021-04-26 PROCEDURE — G0378 HOSPITAL OBSERVATION PER HR: HCPCS

## 2021-04-26 PROCEDURE — 306310 ANTI-EMBOLISM STOCKINGS XXLRG REG: Performed by: INTERNAL MEDICINE

## 2021-04-26 PROCEDURE — 80053 COMPREHEN METABOLIC PANEL: CPT

## 2021-04-26 PROCEDURE — 700111 HCHG RX REV CODE 636 W/ 250 OVERRIDE (IP): Performed by: STUDENT IN AN ORGANIZED HEALTH CARE EDUCATION/TRAINING PROGRAM

## 2021-04-26 PROCEDURE — 97161 PT EVAL LOW COMPLEX 20 MIN: CPT

## 2021-04-26 PROCEDURE — 36415 COLL VENOUS BLD VENIPUNCTURE: CPT

## 2021-04-26 PROCEDURE — 85025 COMPLETE CBC W/AUTO DIFF WBC: CPT

## 2021-04-26 RX ORDER — DEXTROSE MONOHYDRATE 25 G/50ML
50 INJECTION, SOLUTION INTRAVENOUS
Status: DISCONTINUED | OUTPATIENT
Start: 2021-04-26 | End: 2021-04-27

## 2021-04-26 RX ORDER — TAMSULOSIN HYDROCHLORIDE 0.4 MG/1
0.4 CAPSULE ORAL DAILY
Status: DISCONTINUED | OUTPATIENT
Start: 2021-04-26 | End: 2021-05-14 | Stop reason: HOSPADM

## 2021-04-26 RX ORDER — ATORVASTATIN CALCIUM 40 MG/1
40 TABLET, FILM COATED ORAL EVERY EVENING
Status: DISCONTINUED | OUTPATIENT
Start: 2021-04-26 | End: 2021-04-26

## 2021-04-26 RX ORDER — GABAPENTIN 300 MG/1
300 CAPSULE ORAL DAILY
Status: DISCONTINUED | OUTPATIENT
Start: 2021-04-26 | End: 2021-04-27

## 2021-04-26 RX ORDER — ROSUVASTATIN CALCIUM 20 MG/1
40 TABLET, COATED ORAL DAILY
Status: DISCONTINUED | OUTPATIENT
Start: 2021-04-26 | End: 2021-05-14 | Stop reason: HOSPADM

## 2021-04-26 RX ORDER — CARBIDOPA/LEVODOPA 25MG-250MG
1 TABLET ORAL 4 TIMES DAILY
Status: DISCONTINUED | OUTPATIENT
Start: 2021-04-26 | End: 2021-05-14 | Stop reason: HOSPADM

## 2021-04-26 RX ORDER — MORPHINE SULFATE 4 MG/ML
1 INJECTION, SOLUTION INTRAMUSCULAR; INTRAVENOUS ONCE
Status: COMPLETED | OUTPATIENT
Start: 2021-04-26 | End: 2021-04-26

## 2021-04-26 RX ORDER — MENTHOL AND METHYL SALICYLATE 10; 30 G/100G; G/100G
CREAM TOPICAL PRN
Status: DISCONTINUED | OUTPATIENT
Start: 2021-04-26 | End: 2021-04-30

## 2021-04-26 RX ORDER — ACETAMINOPHEN 325 MG/1
650 TABLET ORAL EVERY 6 HOURS
Status: DISCONTINUED | OUTPATIENT
Start: 2021-04-26 | End: 2021-05-09

## 2021-04-26 RX ORDER — TRAMADOL HYDROCHLORIDE 50 MG/1
50 TABLET ORAL EVERY 6 HOURS PRN
Status: DISCONTINUED | OUTPATIENT
Start: 2021-04-26 | End: 2021-04-30

## 2021-04-26 RX ADMIN — TAMSULOSIN HYDROCHLORIDE 0.4 MG: 0.4 CAPSULE ORAL at 10:43

## 2021-04-26 RX ADMIN — ENOXAPARIN SODIUM 40 MG: 40 INJECTION SUBCUTANEOUS at 05:06

## 2021-04-26 RX ADMIN — CARBIDOPA AND LEVODOPA 1 TABLET: 25; 250 TABLET ORAL at 10:42

## 2021-04-26 RX ADMIN — ROSUVASTATIN CALCIUM 40 MG: 20 TABLET, FILM COATED ORAL at 10:41

## 2021-04-26 RX ADMIN — ACETAMINOPHEN 650 MG: 325 TABLET, FILM COATED ORAL at 12:22

## 2021-04-26 RX ADMIN — CARBIDOPA AND LEVODOPA 1 TABLET: 25; 250 TABLET ORAL at 22:45

## 2021-04-26 RX ADMIN — DOCUSATE SODIUM 50 MG AND SENNOSIDES 8.6 MG 2 TABLET: 8.6; 5 TABLET, FILM COATED ORAL at 16:58

## 2021-04-26 RX ADMIN — MORPHINE SULFATE 1 MG: 4 INJECTION INTRAVENOUS at 02:30

## 2021-04-26 RX ADMIN — GABAPENTIN 300 MG: 300 CAPSULE ORAL at 10:42

## 2021-04-26 RX ADMIN — ASPIRIN 81 MG: 81 TABLET, COATED ORAL at 10:42

## 2021-04-26 RX ADMIN — ACETAMINOPHEN 650 MG: 325 TABLET, FILM COATED ORAL at 18:19

## 2021-04-26 RX ADMIN — TRAMADOL HYDROCHLORIDE 50 MG: 50 TABLET ORAL at 16:58

## 2021-04-26 RX ADMIN — CARBIDOPA AND LEVODOPA 1 TABLET: 25; 250 TABLET ORAL at 16:45

## 2021-04-26 ASSESSMENT — GAIT ASSESSMENTS
GAIT LEVEL OF ASSIST: MINIMAL ASSIST
ASSISTIVE DEVICE: FRONT WHEEL WALKER
DISTANCE (FEET): 100

## 2021-04-26 ASSESSMENT — COGNITIVE AND FUNCTIONAL STATUS - GENERAL
TOILETING: A LITTLE
CLIMB 3 TO 5 STEPS WITH RAILING: A LOT
WALKING IN HOSPITAL ROOM: A LITTLE
DAILY ACTIVITIY SCORE: 17
PERSONAL GROOMING: A LITTLE
MOBILITY SCORE: 17
TURNING FROM BACK TO SIDE WHILE IN FLAT BAD: A LITTLE
SUGGESTED CMS G CODE MODIFIER DAILY ACTIVITY: CK
EATING MEALS: A LITTLE
DRESSING REGULAR LOWER BODY CLOTHING: A LOT
SUGGESTED CMS G CODE MODIFIER MOBILITY: CK
STANDING UP FROM CHAIR USING ARMS: A LITTLE
MOVING FROM LYING ON BACK TO SITTING ON SIDE OF FLAT BED: A LITTLE
DRESSING REGULAR UPPER BODY CLOTHING: A LITTLE
MOVING TO AND FROM BED TO CHAIR: A LITTLE
HELP NEEDED FOR BATHING: A LITTLE

## 2021-04-26 ASSESSMENT — PAIN DESCRIPTION - PAIN TYPE
TYPE: NEUROPATHIC PAIN
TYPE: NEUROPATHIC PAIN
TYPE: CHRONIC PAIN
TYPE: CHRONIC PAIN
TYPE: NEUROPATHIC PAIN
TYPE: NEUROPATHIC PAIN

## 2021-04-26 ASSESSMENT — ENCOUNTER SYMPTOMS
COUGH: 0
NAUSEA: 0
CHILLS: 0
FALLS: 0
FEVER: 0
VOMITING: 0
HEADACHES: 0
WEAKNESS: 0
PALPITATIONS: 0
SORE THROAT: 0
INSOMNIA: 0
SHORTNESS OF BREATH: 0

## 2021-04-26 ASSESSMENT — FIBROSIS 4 INDEX: FIB4 SCORE: 1.26

## 2021-04-26 ASSESSMENT — ACTIVITIES OF DAILY LIVING (ADL): TOILETING: INDEPENDENT

## 2021-04-26 ASSESSMENT — LIFESTYLE VARIABLES: SUBSTANCE_ABUSE: 0

## 2021-04-26 NOTE — THERAPY
Physical Therapy   Initial Evaluation     Patient Name: Dinah Mathur  Age:  65 y.o., Sex:  male  Medical Record #: [id number]  Today's Date: 4/26/2021     Precautions: (P) Fall Risk    Assessment  Patient is 65 y.o. male with a diagnosis of bilat LE venous stasis with dermatitis.  Pt reporting bilat hand and foot pain. Notes indicate history of diabetes, and RN reporting pt states he has Parkinson's disease. Pt vague with responses during history today. It appears pt has been staying in an apartment with his brother, sister in law, niece+spouse+children. He is unable to recall if there are any steps. He states he uses a FWW sometimes with mobility. He reports moving from [place] recently. He states he is considering living on the street rather than the crowded apartment. Today, pt demonstrated bed mobility with min A for LEs, and SBA for transfers and gait. He demonstrated some unsteadiness with turning and initial gait. His gait was very slow pace with narrow LEIF and short step length. He will continued to benefit from skilled PT while he remains in acute setting. He will benefit from further PT in the post-acute setting prior to D/C home.    Plan    Recommend Physical Therapy 3 times per week until therapy goals are met for the following treatments:  Bed Mobility, Gait Training, Neuro Re-Education / Balance, Stair Training, Therapeutic Activities and Therapeutic Exercises    DC Equipment Recommendations: (P) Unable to determine at this time  Discharge Recommendations: (P) Recommend post-acute placement for additional physical therapy services prior to discharge home       Subjective    Pt stating he does not feel ready to return home due to weakness and slow gait.     Objective       04/26/21 1420   Prior Living Situation   Prior Services Unable To Determine At This Time   Housing / Facility 1 Story Apartment / Condo   Steps Into Home   (unknown, pt unable to recall)   Equipment Owned Front-Wheel Walker   Lives  with - Patient's Self Care Capacity Other (Comments);Child Less than 18 Years of Age  (brother and brother's spouse, kylee+spouse, kylee's children)   Comments Pt stating he moved to [place] from [place], and has been staying with his brother, sister-in-law, and his kylee's family including children. Pt's level of function is unclear from questioning, but it appears he has a FWW that he use at least sometimes. He says he thinks he might prefer to live on the street over the crowded apartment.    Prior Level of Functional Mobility   Bed Mobility Independent   Transfer Status Independent   Ambulation Independent   Distance Ambulation (Feet)   (household)   Assistive Devices Used Front-Wheel Walker  (at least sometimes; unclear how often he used FWW)   Cognition    Cognition / Consciousness X   Speech/ Communication Delayed Responses   Ability To Follow Commands 1 Step   Attention Impaired   Comments pleasant, difficulty with recalling home setup   Strength Lower Body   Lower Body Strength  X   Gross Strength Generalized Weakness, Equal Bilaterally   Comments appears to have weakness bilat LEs, L>R weakness   Balance Assessment   Sitting Balance (Static) Fair +   Sitting Balance (Dynamic) Fair   Standing Balance (Static) Fair -   Standing Balance (Dynamic) Fair -   Weight Shift Sitting Fair   Weight Shift Standing Fair   Comments with FWW   Gait Analysis   Gait Level Of Assist Minimal Assist  (SBA)   Assistive Device Front Wheel Walker   Distance (Feet) 100   # of Times Distance was Traveled 1   Deviation Decreased Base Of Support;Decreased Heel Strike;Decreased Toe Off;Other (Comment);Bradykinetic  (very slow pace)   # of Stairs Climbed 0   Weight Bearing Status no restriction   Comments very slow pace; no LOB, but some unsteadiness with intial gait and turning   Bed Mobility    Supine to Sit Minimal Assist   Sit to Supine Minimal Assist  (for LEs)   Scooting Minimal Assist   Rolling Supervised   Comments use of bed rail  with rolling   Functional Mobility   Sit to Stand Minimal Assist  (SBA)   Mobility Supine->sit EOB->stand->amb->sit EOB->supine   Comments cues for hand placement with transfer   How much difficulty does the patient currently have...   Turning over in bed (including adjusting bedclothes, sheets and blankets)? 3   Sitting down on and standing up from a chair with arms (e.g., wheelchair, bedside commode, etc.) 3   Moving from lying on back to sitting on the side of the bed? 3   How much help from another person does the patient currently need...   Moving to and from a bed to a chair (including a wheelchair)? 3   Need to walk in a hospital room? 3   Climbing 3-5 steps with a railing? 2   6 clicks Mobility Score 17   Activity Tolerance   Sitting in Chair not tested   Sitting Edge of Bed 5 minutes approx   Standing 10 minutes approx   Comments reports fatigue with amb   Edema / Skin Assessment   Comments dermatitis bilat lower LEs   Patient / Family Goals    Patient / Family Goal #1 get steadier   Short Term Goals    Short Term Goal # 1 Pt will perform transfer bed<->chair with LRAD or no AD within 6 visits in order to return home   Short Term Goal # 2 Pt will ambulate 300' with FWW or no AD with supv within 6 visits in order to return home   Education Group   Education Provided Role of Physical Therapist   Role of Physical Therapist Patient Response Patient;Acceptance;Explanation;Verbal Demonstration   Problem List    Problems Pain;Impaired Bed Mobility;Impaired Transfers;Impaired Ambulation;Functional Strength Deficit;Impaired Balance;Decreased Activity Tolerance;Safety Awareness Deficits / Cognition   Anticipated Discharge Equipment and Recommendations   DC Equipment Recommendations Unable to determine at this time   Discharge Recommendations Recommend post-acute placement for additional physical therapy services prior to discharge home

## 2021-04-26 NOTE — PROGRESS NOTES
Ears: WNL and blanching  Which preventative measures are in place for the ears?    Elbows: WNL and blanching   Which preventative measures are in place for the elbows?    Sacrum: WNL and blanching   Which preventative measures are in place for the sacrum?    Heels: WNL and blanching   Which preventative measures are in place for the heels?    Which devices are in place? NA  Description of skin under devices: NA  Which preventative measures are in place under devices?    Other: bilateral swelling, redness and discoloration to LE feet/ankles no wounds present

## 2021-04-26 NOTE — CARE PLAN
Problem: Communication  Goal: The ability to communicate needs accurately and effectively will improve  Outcome: PROGRESSING AS EXPECTED  Note: Intervention: updated patient on plan of care, reinforced call light education  Outcome: no new concerns at this time, using call light when in need      Problem: Safety  Goal: Will remain free from falls  Outcome: PROGRESSING AS EXPECTED  Note: Intervention: bed alarm is on, non slip socks in use, bed locked/lowest position, hourly rounding in place  Outcome: patient remains free from falls at this time

## 2021-04-26 NOTE — ASSESSMENT & PLAN NOTE
-Neurocognitive eval recommends cognitive therapy at SNF.  Accepted at [facility] pending ins auth.   -Social work is working with the family. The plan after he is discharged from SNF is to go back home with the brother until outpatient SW can place him in a group home.

## 2021-04-26 NOTE — CARE PLAN
Problem: Venous Thromboembolism (VTW)/Deep Vein Thrombosis (DVT) Prevention:  Goal: Patient will participate in Venous Thrombosis (VTE)/Deep Vein Thrombosis (DVT)Prevention Measures  Outcome: PROGRESSING AS EXPECTED  Intervention: Ensure patient wears graduated elastic stockings (TESFAYE hose) and/or SCDs, if ordered, when in bed or chair (Remove at least once per shift for skin check)  Flowsheets (Taken 4/26/2021 1122)  Mechanical Prophylaxis: TESFAYE Hose (Graduated Compression Stockings)  TESFAYE Hose (Graduated Compression Stockings): On     Problem: Discharge Barriers/Planning  Goal: Patient's continuum of care needs will be met  Outcome: PROGRESSING SLOWER THAN EXPECTED  Intervention: Collaborate with Transitional Care Team and Interdisciplinary Team to meet discharge needs  Note: Discussed pt's living situation w/ family at bedside, pt needs help managing his diagnosis and medications. Pt is oriented but also suspected to have some cognitive deficits, discussed this w/ family and [facility] team for a possible cog eval and help w/ social work

## 2021-04-26 NOTE — PROGRESS NOTES
Pt is A&Ox4. Pt is resting in bed, no signs of labored breathing. Denies pain. Pt on RA. Call light & personal belongings within reach, bed in lowest position & locked, and bed alarm is on. Fall precautions in place and education provided on how to use call light. Pt updated on plan of care for the shift. Pt declines any additional needs at this time.

## 2021-04-26 NOTE — THERAPY
"Occupational Therapy   Initial Evaluation     Patient Name: Dinah Mathur  Age:  65 y.o., Sex:  male  Medical Record #: [id number]  Today's Date: 4/26/2021     Precautions  Precautions: Fall Risk  Comments: BLE pain    Assessment  Patient is 65 y.o. male with a diagnosis of BLE cellulitis.  Additional factors influencing patient status / progress: pain in BLE, decreased activity tolerance.      Plan    Recommend Occupational Therapy 2 times per week until therapy goals are met for the following treatments:  Adaptive Equipment, Neuro Re-Education / Balance, Self Care/Activities of Daily Living and Therapeutic Activities.    DC Equipment Recommendations: Unable to determine at this time  Discharge Recommendations: at this time, pt most impacted by BLE pain during ADL/functional mobility. Anticipate once pain is controlled he will progress quite well and be able to d/c w/o any OT needs.    Subjective    \"I don't know why I'm moving so slow\"     Objective       04/26/21 0851   Prior Living Situation   Housing / Facility Homeless   Comments Pt just moved here from [place] 2 months ago.   Prior Level of ADL Function   Self Feeding Independent   Grooming / Hygiene Independent   Bathing Independent   Dressing Independent   Toileting Independent   Prior Level of IADL Function   Prior Level Of Mobility Independent Without Device in Community;Independent Without Device in Home   Cognition    Cognition / Consciousness X   Speech/ Communication Delayed Responses   Initiation Impaired   Comments needs encouragement to try tasks on his own first, frequently asks before help before trying   Balance Assessment   Sitting Balance (Static) Fair +   Sitting Balance (Dynamic) Fair   Standing Balance (Static) Fair -   Standing Balance (Dynamic) Fair -   Weight Shift Sitting Fair   Weight Shift Standing Fair   Comments w/ fww   Bed Mobility    Supine to Sit Minimal Assist   Sit to Supine Minimal Assist   Scooting Supervised   ADL Assessment "   Grooming Supervision;Standing  (oral care, wash hands, wash face)   Lower Body Dressing Maximal Assist  (doff socks, don slippers)   Toileting Supervision  (urinate in stance)   Comments pain in BLE impacting ability to perform LB dressing   Functional Mobility   Sit to Stand Supervised   Bed, Chair, Wheelchair Transfer Supervised   Mobility EOB>sink>BR>BTB   Comments w/ fww   Patient / Family Goals   Patient / Family Goal #1 none stated   Short Term Goals   Short Term Goal # 1 pt will dress LB with supv

## 2021-04-26 NOTE — NON-PROVIDER
[facility] Medicine Interval Note    Name Dinah Mathur 1955   Age/Sex 65 y.o. male   MRN [id number]   Code Status full       Chief complaint/ reason for interval visit (Primary Diagnosis)   BLE pain and swelling     HPI:  Dinah Mathur is a 65 y.o. male with a PMH of Parkinson Disease (on Sinemet 250mg QID), Diabetes (on metformin 500mg BID, most recent A1C 6.8 on ), chronic lower extremity edema, who was brought in by [facility] on  with BLE swelling and pain, and was admitted for concerns of cellulitis. Patient reports BLE swelling and 7/10 pain has been onging since 1 year, is not worse in the morning or evening, pain is constant, without alleviating factors. Patient uses a walker due to pain when ambulating. Patient further reports bilateral hand pain, stiffness and mild deformities also ongoing for 1 year, is constant pain, no difference in pain in morning vs. evening, without alleviating factors. He denies headache, chest pain, dyspnea, dysuria, fever, N/V/D/C.     In the ED, his vitals WNL. Labs unremarkable besides increased Beta-hydroxybutyrate (likely from starvation ketosis) and A1C 6.8. Inflammatory markers (CRP) normal. CXR: unremarkable and no acute cardiopulmonary process. US DVT negative.     No acute overnight events. Today, patient reports no change in 7/10 bilateral hand pain and 7/10 bilateral leg pain. He reports no new issues. Denies changes in BLE edema. Denies fever, N/V/D/C, chest pain, dyspnea, abdominal pain, dysuria.     PMHx:  Diabetes type II: takes metformin 500 mg BID  Parkinson Disease: diagnosed 3 months ago in [place], takes Sinemet 250 mg QID  Furosemide 40mg daily     Allergies: NKDA    PSHx:  No PSHx    FHx:  No significant past family history    Social History:   Per nursing notes: Patient's sister, Haley was called to discuss patient's case. Haley states patient is not homeless and that he lives with a brother in [place]  and has been there since December. Haley states patient had a fall three years ago and suffered from a spinal or neck injury and states he has cognitive deficits. States he sees Dr. Kathy Steve at [facility]. Contact for Haley in chart.  Met with brother who states patient lives with him in small 2 bedroom apartment, requests options for assistance.     Tobacco: Former, quit 35 years ago  Alcohol: Former, quit 1 year ago  Recreational drugs (illegal and prescription):  Denies  Employment: Unemployed  Activity Level: Limited  Living situation: Homeless  Recent travel: Yes, from [place] 2 months ago and hitchhiked here in [place]  Primary Care Provider: reviewed Pcp Pt States None  Other (stressors, spirituality, exposures): None    OBJECTIVE     Vitals:    04/26/21 0334 04/26/21 0728 04/26/21 1028 04/26/21 1440   BP: 123/70 134/64  102/58   Pulse: 61 64  65   Resp: 16 16  15   Temp: 37 °C (98.6 °F) 36.8 °C (98.2 °F)  36.4 °C (97.5 °F)   TempSrc: Temporal Temporal  Temporal   SpO2: 97% 98%  93%   Weight:   76.9 kg (169 lb 8.5 oz)    Height:         Body mass index is 25.04 kg/m². Weight: 76.9 kg (169 lb 8.5 oz)  Oxygen Therapy:  Pulse Oximetry: 93 %, O2 (LPM): 0, O2 Delivery Device: None - Room Air    Physical Exam  Gen: NAD  Head: normocephalic and atraumatic.   Mouth: mucous membranes dry, no ulceration, good dentition.   Eyes: EOMI. No scleral icterus  Cards: normal rate and regular rhythm. No murmurs, gallops, or rubs are auscultated.   Pulm: No respiratory distress. CTAB. No wheezing, no crackles.   Abdominal: soft, nontender to palpation, nondistended. No guarding. No rebound tenderness. Bowel sounds are present in all 4 quadrants.   MSK:  Swelling 1+ bilateral lower extremity edema from feet to knee. Hyperpigmentation in BLE. Sensation intact in BLE. BLE not erythematous, no ulcerations, non-tender to palpation.   Mild tenderness to palpation in bilateral hands. Bilateral ulnar deviation.  Chronic deformities in both hands without evidence of acute infection/inflammation.    Neuro: No focal neurological signs, but may have some mild developmental delay-like issues.       Lab Data Review:  2021  2:41 PM    Recent Labs     21   SODIUM 134* 140   POTASSIUM 4.0 4.0   CHLORIDE 96 105   CO2 27 25   BUN 36* 21   CREATININE 1.02 0.91   MAGNESIUM 2.3 2.5   PHOSPHORUS 3.6  --    CALCIUM 9.8 9.3       Recent Labs     21  025   ALTSGPT 7 29   ASTSGOT 24 20   ALKPHOSPHAT 105* 92   TBILIRUBIN 0.5 0.6   GLUCOSE 117* 111*       Recent Labs     21   RBC 5.34 5.15   HEMOGLOBIN 15.2 14.4   HEMATOCRIT 46.8 44.1   PLATELETCT 177 191       Recent Labs     21   WBC 8.9 8.4   NEUTSPOLYS 68.60 64.00   LYMPHOCYTES 21.80* 24.10   MONOCYTES 5.20 7.40   EOSINOPHILS 3.00 3.40   BASOPHILS 1.20 0.70   ASTSGOT 24 20   ALTSGPT 7 29   ALKPHOSPHAT 105* 92   TBILIRUBIN 0.5 0.6     Imagin/25 US-EXTREMITY VENOUS LOWER BILAT   CONCLUSIONS   No evidence of deep venous thrombosis.    Soft tissue edema.      x-ray Left hand  IMPRESSION:  No evidence of acute fracture or dislocation.  Degenerative changes.  No evidence of erosive arthropathy.     X-ray RIGHT hand  FINDINGS:  There is no evidence of fracture or dislocation. There is interphalangeal joint space narrowing and spurring. No osseous erosion is identified. No soft tissue calcification is seen.    Assessment and Plan:   Dinah Mathur is a 65 y.o. male with a PMHx of Parkinson Disease (on Sinemet 250mg QID), Diabetes (on metformin 500mg BID, most recent A1C 6.8 on ), who was brought in by [facility] on  with chronic lower extremity swelling, and was admitted for concerns of cellulitis.    *Bilateral Lower Extremity Edema  Assessment & Plan  At baseline and not worsening B/L LE edema and foot pain that improves with elevation. Was on Lasix in the past apparently.  No evidence of DVTs or infection on admission.  Etiologies include: arterial insufficiency, venous insufficiency, lymphedema, less likely cellulitis (per exam findings: not erythematous, no ulceration, nontender), DVT (negative US LE), CHF (normal EKG, wnl BNP). Liver, Kidney parameters wnl.   Plan:  - Ankle-brachial Index   - Elevate legs  - compression stockings  - continue scheduled tylenol and aspirin  - Gabapentin 300 mg daily  - Rosuvastatin 40mg daily  - Tamsulosin 0.4 mg daily  - Diabetic Diet  - PT/OT  - Social work consult for disposition     Bilateral Hand Pain  Assessment & Plan  X-ray of left and right hand shows no evidence of fracture or dislocation. No evidence of erosive arthropathy.   Plan:   - continue scheduled tylenol and aspirin     DM (diabetes mellitus) ([facility])  Assessment & Plan  History of diabetes and is taking Metformin, A1C 6.8 on admission.   Plan:  - Continue Metformin    Parkinson's Disease   Assessment & Plan  History of PD and is taking carbidopa-levodopa  mg tablet QID  Plan:   - Continue Sinemet      Bilateral hand pain  Assessment & Plan  B/L Hand pain apparently took Meloxicam and possibly Tramadol for in the past. Strength 4/5 and minor tenderness to palpation. X-ray of hand showed no evidence of erosive arthropathy or acute fracture or dislocation.   Plan:  - PT/OT

## 2021-04-26 NOTE — PROGRESS NOTES
Daily Progress Note:     Date of Service: 4/26/2021  Primary Team: [facility] IM Yellow Team   Attending: Opal Zapien M.D.   Senior Resident: Navya Mccormick M.D.  Contact:  [phone]    ID:   65-year-old male with a history of chronic leg swelling as well as bilateral hand pain who is presenting with bilateral lower extremity edema.    Interval Update:  The patient states that his leg swelling has improved this morning.  He also stated that he is having pain in both of his hands.  He states that the pain has been constant for years and has not been improving.  It is not worse in the morning and stays throughout the day.  After speaking with the family, there are some concerns of the patient's ability to take care of himself at home, and the patient agrees that he also has some difficulty taking care of himself.    Consultants/Specialty:  None    Review of Systems:    Review of Systems   Constitutional: Negative for chills and fever.   HENT: Negative for congestion and sore throat.    Respiratory: Negative for cough and shortness of breath.    Cardiovascular: Negative for chest pain and palpitations.   Gastrointestinal: Negative for nausea and vomiting.   Genitourinary: Negative for dysuria and urgency.   Musculoskeletal: Positive for joint pain. Negative for falls.   Skin: Negative for itching and rash.   Neurological: Negative for weakness and headaches.   Psychiatric/Behavioral: Negative for substance abuse. The patient does not have insomnia.        Objective Data:   Physical Exam:   Vitals:   Temp:  [36.2 °C (97.1 °F)-37 °C (98.6 °F)] 36.4 °C (97.5 °F)  Pulse:  [61-80] 65  Resp:  [15-20] 15  BP: (102-134)/(58-81) 102/58  SpO2:  [93 %-98 %] 93 %     Physical Exam  Vitals and nursing note reviewed.   Constitutional:       General: He is not in acute distress.     Appearance: He is not toxic-appearing.   HENT:      Head: Normocephalic and atraumatic.   Eyes:      General: No scleral icterus.     Extraocular  Movements: Extraocular movements intact.      Pupils: Pupils are equal, round, and reactive to light.   Cardiovascular:      Rate and Rhythm: Normal rate and regular rhythm.      Heart sounds: No murmur. No gallop.    Pulmonary:      Effort: Pulmonary effort is normal. No respiratory distress.      Breath sounds: Normal breath sounds.   Abdominal:      General: Abdomen is flat. There is no distension.      Palpations: Abdomen is soft.      Tenderness: There is no abdominal tenderness.   Musculoskeletal:      Right lower leg: Edema (1+ pitting edema) present.      Left lower leg: Edema (1+ pitting edema) present.      Comments: Trigger finger of the left right finger.    Skin:     General: Skin is warm and dry.      Capillary Refill: Capillary refill takes less than 2 seconds.      Comments: No open wounds, redness, warmth in bilateral lower extremities.  There is discoloration consistent with changes of venous stasis dermatitis.   Neurological:      General: No focal deficit present.      Mental Status: He is alert.   Psychiatric:         Mood and Affect: Mood normal.         Behavior: Behavior normal.       Labs:   Results for orders placed or performed during the hospital encounter of 04/25/21   CBC w/ Differential   Result Value Ref Range    WBC 8.9 4.8 - 10.8 K/uL    RBC 5.34 4.70 - 6.10 M/uL    Hemoglobin 15.2 14.0 - 18.0 g/dL    Hematocrit 46.8 42.0 - 52.0 %    MCV 87.6 81.4 - 97.8 fL    MCH 28.5 27.0 - 33.0 pg    MCHC 32.5 (L) 33.7 - 35.3 g/dL    RDW 41.1 35.9 - 50.0 fL    Platelet Count 177 164 - 446 K/uL    MPV 9.7 9.0 - 12.9 fL    Neutrophils-Polys 68.60 44.00 - 72.00 %    Lymphocytes 21.80 (L) 22.00 - 41.00 %    Monocytes 5.20 0.00 - 13.40 %    Eosinophils 3.00 0.00 - 6.90 %    Basophils 1.20 0.00 - 1.80 %    Immature Granulocytes 0.20 0.00 - 0.90 %    Nucleated RBC 0.00 /100 WBC    Neutrophils (Absolute) 6.07 1.82 - 7.42 K/uL    Lymphs (Absolute) 1.93 1.00 - 4.80 K/uL    Monos (Absolute) 0.46 0.00 - 0.85  K/uL    Eos (Absolute) 0.27 0.00 - 0.51 K/uL    Baso (Absolute) 0.11 0.00 - 0.12 K/uL    Immature Granulocytes (abs) 0.02 0.00 - 0.11 K/uL    NRBC (Absolute) 0.00 K/uL   Urinalysis, culture if indicated    Specimen: Urine   Result Value Ref Range    Color Yellow     Character Clear     Specific Gravity 1.020 <1.035    Ph 7.0 5.0 - 8.0    Glucose Negative Negative mg/dL    Ketones Negative Negative mg/dL    Protein 100 (A) Negative mg/dL    Bilirubin Negative Negative    Urobilinogen, Urine 0.2 Negative    Nitrite Negative Negative    Leukocyte Esterase Negative Negative    Occult Blood Negative Negative    Micro Urine Req Microscopic    PHOSPHORUS   Result Value Ref Range    Phosphorus 3.6 2.5 - 4.5 mg/dL   MAGNESIUM   Result Value Ref Range    Magnesium 2.3 1.5 - 2.5 mg/dL   HEMOGLOBIN A1C   Result Value Ref Range    Glycohemoglobin 6.8 (H) 4.0 - 5.6 %    Est Avg Glucose 148 mg/dL   BETA-HYDROXYBUTYRIC ACID   Result Value Ref Range    beta-Hydroxybutyric Acid 0.48 (H) 0.02 - 0.27 mmol/L   TROPONIN   Result Value Ref Range    Troponin T 21 (H) 6 - 19 ng/L   DIAGNOSTIC ALCOHOL   Result Value Ref Range    Diagnostic Alcohol <10.1 0.0 - 10.0 mg/dL   proBrain Natriuretic Peptide, NT   Result Value Ref Range    NT-proBNP 10 0 - 125 pg/mL   Comp Metabolic Panel   Result Value Ref Range    Sodium 134 (L) 135 - 145 mmol/L    Potassium 4.0 3.6 - 5.5 mmol/L    Chloride 96 96 - 112 mmol/L    Co2 27 20 - 33 mmol/L    Anion Gap 11.0 7.0 - 16.0    Glucose 117 (H) 65 - 99 mg/dL    Bun 36 (H) 8 - 22 mg/dL    Creatinine 1.02 0.50 - 1.40 mg/dL    Calcium 9.8 8.5 - 10.5 mg/dL    AST(SGOT) 24 12 - 45 U/L    ALT(SGPT) 7 2 - 50 U/L    Alkaline Phosphatase 105 (H) 30 - 99 U/L    Total Bilirubin 0.5 0.1 - 1.5 mg/dL    Albumin 4.8 3.2 - 4.9 g/dL    Total Protein 8.4 (H) 6.0 - 8.2 g/dL    Globulin 3.6 (H) 1.9 - 3.5 g/dL    A-G Ratio 1.3 g/dL   ESTIMATED GFR   Result Value Ref Range    GFR If African American >60 >60 mL/min/1.73 m 2    GFR If  Non African American >60 >60 mL/min/1.73 m 2   Sed Rate   Result Value Ref Range    Sed Rate Westergren 2 0 - 20 mm/hour   CRP QUANTITIVE (NON-CARDIAC)   Result Value Ref Range    Stat C-Reactive Protein <0.30 0.00 - 0.75 mg/dL   URINE MICROSCOPIC (W/UA)   Result Value Ref Range    WBC Rare (A) /hpf    RBC Rare /hpf    Bacteria Negative None /hpf    Epithelial Cells Negative /hpf   LACTIC ACID   Result Value Ref Range    Lactic Acid 1.4 0.5 - 2.0 mmol/L   BLOOD CULTURE    Specimen: Peripheral; Blood   Result Value Ref Range    Significant Indicator NEG     Source BLD     Site PERIPHERAL     Culture Result       No Growth  Note: Blood cultures are incubated for 5 days and  are monitored continuously.Positive blood cultures  are called to the RN and reported as soon as  they are identified.     BLOOD CULTURE    Specimen: Peripheral; Blood   Result Value Ref Range    Significant Indicator NEG     Source BLD     Site PERIPHERAL     Culture Result       No Growth  Note: Blood cultures are incubated for 5 days and  are monitored continuously.Positive blood cultures  are called to the RN and reported as soon as  they are identified.     URINE DRUG SCREEN   Result Value Ref Range    Amphetamines Urine Negative Negative    Barbiturates Negative Negative    Benzodiazepines Negative Negative    Cocaine Metabolite Negative Negative    Methadone Negative Negative    Opiates Negative Negative    Oxycodone Negative Negative    Phencyclidine -Pcp Negative Negative    Propoxyphene Negative Negative    Cannabinoid Metab Negative Negative   SARS-CoV-2 PCR (24 hour In-House): Collect NP swab in [facility]    Specimen: Respirate   Result Value Ref Range    SARS-CoV-2 Source NP Swab     SARS-CoV-2 by PCR NotDetected    CBC WITH DIFFERENTIAL   Result Value Ref Range    WBC 8.4 4.8 - 10.8 K/uL    RBC 5.15 4.70 - 6.10 M/uL    Hemoglobin 14.4 14.0 - 18.0 g/dL    Hematocrit 44.1 42.0 - 52.0 %    MCV 85.6 81.4 - 97.8 fL    MCH 28.0 27.0 - 33.0 pg     MCHC 32.7 (L) 33.7 - 35.3 g/dL    RDW 40.1 35.9 - 50.0 fL    Platelet Count 191 164 - 446 K/uL    MPV 9.6 9.0 - 12.9 fL    Neutrophils-Polys 64.00 44.00 - 72.00 %    Lymphocytes 24.10 22.00 - 41.00 %    Monocytes 7.40 0.00 - 13.40 %    Eosinophils 3.40 0.00 - 6.90 %    Basophils 0.70 0.00 - 1.80 %    Immature Granulocytes 0.40 0.00 - 0.90 %    Nucleated RBC 0.00 /100 WBC    Neutrophils (Absolute) 5.39 1.82 - 7.42 K/uL    Lymphs (Absolute) 2.03 1.00 - 4.80 K/uL    Monos (Absolute) 0.62 0.00 - 0.85 K/uL    Eos (Absolute) 0.29 0.00 - 0.51 K/uL    Baso (Absolute) 0.06 0.00 - 0.12 K/uL    Immature Granulocytes (abs) 0.03 0.00 - 0.11 K/uL    NRBC (Absolute) 0.00 K/uL   Comp Metabolic Panel   Result Value Ref Range    Sodium 140 135 - 145 mmol/L    Potassium 4.0 3.6 - 5.5 mmol/L    Chloride 105 96 - 112 mmol/L    Co2 25 20 - 33 mmol/L    Anion Gap 10.0 7.0 - 16.0    Glucose 111 (H) 65 - 99 mg/dL    Bun 21 8 - 22 mg/dL    Creatinine 0.91 0.50 - 1.40 mg/dL    Calcium 9.3 8.5 - 10.5 mg/dL    AST(SGOT) 20 12 - 45 U/L    ALT(SGPT) 29 2 - 50 U/L    Alkaline Phosphatase 92 30 - 99 U/L    Total Bilirubin 0.6 0.1 - 1.5 mg/dL    Albumin 4.2 3.2 - 4.9 g/dL    Total Protein 7.2 6.0 - 8.2 g/dL    Globulin 3.0 1.9 - 3.5 g/dL    A-G Ratio 1.4 g/dL   MAGNESIUM   Result Value Ref Range    Magnesium 2.5 1.5 - 2.5 mg/dL   ESTIMATED GFR   Result Value Ref Range    GFR If African American >60 >60 mL/min/1.73 m 2    GFR If Non African American >60 >60 mL/min/1.73 m 2   EKG (NOW)   Result Value Ref Range    Report       [facility] Emergency Dept.    Test Date:  2021  Pt Name:    ALBERT VEGA             Department: ER  MRN:        [id number]                      Room:       [facility]  Gender:     Male                         Technician: [id number]  :        1955                   Requested By:ER TRIAGE PROTOCOL  Order #:    [id number]                    Reading MD: BIJAN DIAZ MD    Measurements  Intervals                                 Axis  Rate:       85                           P:          77  NE:         128                          QRS:        78  QRSD:       174                          T:          36  QT:         408  QTc:        486    Interpretive Statements  SINUS RHYTHM  NONSPECIFIC INTRAVENTRICULAR CONDUCTION DELAY  PROBABLE LEFT VENTRICULAR HYPERTROPHY  BASELINE WANDER IN LEAD(S) II,III,aVF,V4  No previous ECG available for comparison  Electronically Signed On 4- 8:49:42 PDT by BIJAN DIAZ MD         Imaging:   DX-HAND 3+ LEFT   Final Result      No evidence of acute fracture or dislocation.      Degenerative changes.      No evidence of erosive arthropathy.      DX-HAND 3+ RIGHT   Final Result      No evidence of acute fracture or dislocation.      No evidence of erosive arthropathy.      Degenerative changes.         US-EXTREMITY VENOUS LOWER BILAT   Final Result      DX-CHEST-PORTABLE (1 VIEW)   Final Result      No acute cardiopulmonary process is seen.          Problem Representation:   The patient is a 65-year-old male with a past medical history of diabetes (last A1c 6.8), Parkinson's (diagnosed at an outside hospital a few months ago on Sinemet), bilateral hand arthritis, and chronic lower extremity edema.  He presented to the hospital for hand pain, bilateral lower extremity edema, and failure to thrive.    * Failure to thrive in adult  Assessment & Plan  The patient is presenting with failure to thrive.  As per the family, the patient has some difficulty at home.  They are concerned that he may be having some neurocognitive issues at this time.    Plan:  -PT/OT  -Neurocognitive evaluation  -Social work consult    Venous stasis dermatitis of both lower extremities  Assessment & Plan  The rash that the patient has is likely venous stasis dermatitis in his bilateral lower extremities.  Edema is likely related to venous insufficiency as well.  The patient had a negative BNP, negative DVT  ultrasound, strong pulses on exam, negative ESR and CRP, and as per the patient this has been chronic and ongoing for years.  His edema had improved this morning as well after he had been laying down all night.  This makes it much less likely to be heart failure and more likely to be related to venous stasis.    Plan:  -Compression stockings ordered      Parkinson disease ([facility])  Assessment & Plan  Per the patient, he was diagnosed with Parkinson's disease in [place] a few months back.  The patient is on Sinemet currently 4 times a day.    Plan:  -Continue Sinemet.  -The patient will need to be followed as an outpatient by his primary care provider for further work-up of this.    Osteoarthritis of both hands  Assessment & Plan  The patient has a history of chronic bilateral hand pain.  He worked as a  for a while.  His x-rays are consistent with osteoarthritis.  ESR and CRP were negative.    Plan:  -Added IcyHot for arthritic pain  -Continue scheduled Tylenol every 6 hours  -Added tramadol as needed for pain  -Patient was also on meloxicam at home.  However, we will just use tramadol inpatient for now.    DM (diabetes mellitus) ([facility])  Assessment & Plan  The patient has a history of diabetes mellitus on Metformin.  Last A1c was this admission and it was 6.8.    Plan:  -Continue sliding scale insulin and Accu-Cheks  -Hypoglycemia protocol      DVT ppx: Lovenox  Diet: Diabetic  Tubes/Lines: PIV  Code status: FULL    Navya Mccormick M.D.

## 2021-04-27 LAB
GLUCOSE BLD-MCNC: 107 MG/DL (ref 65–99)
GLUCOSE BLD-MCNC: 130 MG/DL (ref 65–99)
GLUCOSE BLD-MCNC: 88 MG/DL (ref 65–99)
GLUCOSE BLD-MCNC: 96 MG/DL (ref 65–99)

## 2021-04-27 PROCEDURE — 82962 GLUCOSE BLOOD TEST: CPT | Mod: 91

## 2021-04-27 PROCEDURE — 99224 PR SUBSEQUENT OBSERVATION CARE,LEVEL I: CPT | Mod: GC | Performed by: INTERNAL MEDICINE

## 2021-04-27 PROCEDURE — A9270 NON-COVERED ITEM OR SERVICE: HCPCS | Performed by: STUDENT IN AN ORGANIZED HEALTH CARE EDUCATION/TRAINING PROGRAM

## 2021-04-27 PROCEDURE — G0378 HOSPITAL OBSERVATION PER HR: HCPCS

## 2021-04-27 PROCEDURE — 700102 HCHG RX REV CODE 250 W/ 637 OVERRIDE(OP): Performed by: STUDENT IN AN ORGANIZED HEALTH CARE EDUCATION/TRAINING PROGRAM

## 2021-04-27 PROCEDURE — 97535 SELF CARE MNGMENT TRAINING: CPT

## 2021-04-27 PROCEDURE — 700111 HCHG RX REV CODE 636 W/ 250 OVERRIDE (IP): Performed by: STUDENT IN AN ORGANIZED HEALTH CARE EDUCATION/TRAINING PROGRAM

## 2021-04-27 PROCEDURE — 96372 THER/PROPH/DIAG INJ SC/IM: CPT

## 2021-04-27 RX ORDER — DEXTROSE MONOHYDRATE 25 G/50ML
50 INJECTION, SOLUTION INTRAVENOUS
Status: DISCONTINUED | OUTPATIENT
Start: 2021-04-27 | End: 2021-04-28

## 2021-04-27 RX ORDER — GABAPENTIN 300 MG/1
300 CAPSULE ORAL 2 TIMES DAILY
Status: DISCONTINUED | OUTPATIENT
Start: 2021-04-27 | End: 2021-05-05

## 2021-04-27 RX ADMIN — GABAPENTIN 300 MG: 300 CAPSULE ORAL at 04:33

## 2021-04-27 RX ADMIN — TAMSULOSIN HYDROCHLORIDE 0.4 MG: 0.4 CAPSULE ORAL at 04:33

## 2021-04-27 RX ADMIN — ACETAMINOPHEN 650 MG: 325 TABLET, FILM COATED ORAL at 12:39

## 2021-04-27 RX ADMIN — DOCUSATE SODIUM 50 MG AND SENNOSIDES 8.6 MG 2 TABLET: 8.6; 5 TABLET, FILM COATED ORAL at 04:32

## 2021-04-27 RX ADMIN — ACETAMINOPHEN 650 MG: 325 TABLET, FILM COATED ORAL at 04:33

## 2021-04-27 RX ADMIN — CARBIDOPA AND LEVODOPA 1 TABLET: 25; 250 TABLET ORAL at 04:33

## 2021-04-27 RX ADMIN — CARBIDOPA AND LEVODOPA 1 TABLET: 25; 250 TABLET ORAL at 22:19

## 2021-04-27 RX ADMIN — ACETAMINOPHEN 650 MG: 325 TABLET, FILM COATED ORAL at 17:49

## 2021-04-27 RX ADMIN — ROSUVASTATIN CALCIUM 40 MG: 20 TABLET, FILM COATED ORAL at 04:33

## 2021-04-27 RX ADMIN — ENOXAPARIN SODIUM 40 MG: 40 INJECTION SUBCUTANEOUS at 04:33

## 2021-04-27 RX ADMIN — ACETAMINOPHEN 650 MG: 325 TABLET, FILM COATED ORAL at 00:45

## 2021-04-27 RX ADMIN — ASPIRIN 81 MG: 81 TABLET, COATED ORAL at 04:32

## 2021-04-27 RX ADMIN — GABAPENTIN 300 MG: 300 CAPSULE ORAL at 17:49

## 2021-04-27 RX ADMIN — TRAMADOL HYDROCHLORIDE 50 MG: 50 TABLET ORAL at 20:34

## 2021-04-27 ASSESSMENT — COGNITIVE AND FUNCTIONAL STATUS - GENERAL
DAILY ACTIVITIY SCORE: 21
DRESSING REGULAR LOWER BODY CLOTHING: A LITTLE
HELP NEEDED FOR BATHING: A LITTLE
SUGGESTED CMS G CODE MODIFIER DAILY ACTIVITY: CJ
TOILETING: A LITTLE

## 2021-04-27 ASSESSMENT — ENCOUNTER SYMPTOMS
INSOMNIA: 0
FALLS: 0
NAUSEA: 0
HEADACHES: 0
PALPITATIONS: 0
FEVER: 0
VOMITING: 0
SORE THROAT: 0
WEAKNESS: 0
SHORTNESS OF BREATH: 0
COUGH: 0
CHILLS: 0

## 2021-04-27 ASSESSMENT — PAIN DESCRIPTION - PAIN TYPE
TYPE: NEUROPATHIC PAIN
TYPE: NEUROPATHIC PAIN;CHRONIC PAIN
TYPE: NEUROPATHIC PAIN
TYPE: NEUROPATHIC PAIN

## 2021-04-27 ASSESSMENT — LIFESTYLE VARIABLES: SUBSTANCE_ABUSE: 0

## 2021-04-27 NOTE — PROGRESS NOTES
Pt is A&Ox4, VSS on RA.  Pt is able to ambulate with x1 assist.  Pt reports 3/10 pain, medicated per MAR.

## 2021-04-27 NOTE — PROGRESS NOTES
Ears: pink, blanching bilaterally  Which preventative measures are in place for the ears?  - encouraged pt to remove glasses while sleeping    Elbows: pink, blanching, intact  Which preventative measures are in place for the elbows?  - new mepilex in place    Sacrum: red, small opening on right upper buttock  Which preventative measures are in place for the sacrum?  - cleansed,  barried cream applied    Heels: red, blanching, bilaterally  Which preventative measures are in place for the heels?  - new mepilex in place    Which devices are in place? PIV  Description of skin under devices: clean, dry, intact  Which preventative measures are in place under devices? n/a    Other: lower extremities are swollen, flaky, shiny skin, some discoloation. Face is flaky.

## 2021-04-27 NOTE — PROGRESS NOTES
Pt is A&Ox4. Pt is resting in bed, no signs of labored breathing or pain. Pt on RA. Family present at bedside. Call light & personal belongings within reach, bed in lowest position & locked, and bed alarm is on. Fall precautions in place and education provided on how to use call light. Pt updated on plan of care for the shift. Pt declines any additional needs at this time.

## 2021-04-27 NOTE — PROGRESS NOTES
Daily Progress Note:     Date of Service: 4/27/2021  Primary Team: [facility] IM Yellow Team   Attending: Opal Zapien M.D.   Senior Resident: Navya Mccormick M.D.  Contact:  [phone]    ID:   65-year-old male with a history of chronic leg swelling as well as bilateral hand pain who is presenting with bilateral lower extremity edema.     Interval Update:  The patient swelling continues to improve.  He states that his pain is much improved from yesterday.  However, he is still having some numbness and tingling in both of his legs and his hands.  Otherwise, no other issues overnight.    Consultants/Specialty:  None    Review of Systems:    Review of Systems   Constitutional: Negative for chills and fever.   HENT: Negative for congestion and sore throat.    Respiratory: Negative for cough and shortness of breath.    Cardiovascular: Negative for chest pain and palpitations.   Gastrointestinal: Negative for nausea and vomiting.   Genitourinary: Negative for dysuria and urgency.   Musculoskeletal: Positive for joint pain. Negative for falls.   Skin: Negative for itching and rash.   Neurological: Negative for weakness and headaches.   Psychiatric/Behavioral: Negative for substance abuse. The patient does not have insomnia.        Objective Data:   Physical Exam:   Vitals:   Temp:  [36.2 °C (97.1 °F)-36.8 °C (98.2 °F)] 36.2 °C (97.1 °F)  Pulse:  [57-68] 57  Resp:  [15-18] 18  BP: ()/(48-65) 108/65  SpO2:  [93 %-96 %] 93 %     Physical Exam  Vitals and nursing note reviewed.   Constitutional:       General: He is not in acute distress.     Appearance: He is not toxic-appearing.   HENT:      Head: Normocephalic and atraumatic.   Eyes:      General: No scleral icterus.     Extraocular Movements: Extraocular movements intact.      Pupils: Pupils are equal, round, and reactive to light.   Cardiovascular:      Rate and Rhythm: Normal rate and regular rhythm.      Heart sounds: No murmur. No gallop.    Pulmonary:      Effort:  Pulmonary effort is normal. No respiratory distress.      Breath sounds: Normal breath sounds.   Abdominal:      General: Abdomen is flat. There is no distension.      Palpations: Abdomen is soft.      Tenderness: There is no abdominal tenderness.   Musculoskeletal:      Right lower leg: Edema (1+ pitting edema) present.      Left lower leg: Edema (1+ pitting edema) present.      Comments: Trigger finger of the left right finger.    Skin:     General: Skin is warm and dry.      Capillary Refill: Capillary refill takes less than 2 seconds.      Comments: No open wounds, redness, warmth in bilateral lower extremities.  There is discoloration consistent with changes of venous stasis dermatitis.   Neurological:      General: No focal deficit present.      Mental Status: He is alert.   Psychiatric:         Mood and Affect: Mood normal.         Behavior: Behavior normal.       Labs:   Results for orders placed or performed during the hospital encounter of 04/25/21   CBC w/ Differential   Result Value Ref Range    WBC 8.9 4.8 - 10.8 K/uL    RBC 5.34 4.70 - 6.10 M/uL    Hemoglobin 15.2 14.0 - 18.0 g/dL    Hematocrit 46.8 42.0 - 52.0 %    MCV 87.6 81.4 - 97.8 fL    MCH 28.5 27.0 - 33.0 pg    MCHC 32.5 (L) 33.7 - 35.3 g/dL    RDW 41.1 35.9 - 50.0 fL    Platelet Count 177 164 - 446 K/uL    MPV 9.7 9.0 - 12.9 fL    Neutrophils-Polys 68.60 44.00 - 72.00 %    Lymphocytes 21.80 (L) 22.00 - 41.00 %    Monocytes 5.20 0.00 - 13.40 %    Eosinophils 3.00 0.00 - 6.90 %    Basophils 1.20 0.00 - 1.80 %    Immature Granulocytes 0.20 0.00 - 0.90 %    Nucleated RBC 0.00 /100 WBC    Neutrophils (Absolute) 6.07 1.82 - 7.42 K/uL    Lymphs (Absolute) 1.93 1.00 - 4.80 K/uL    Monos (Absolute) 0.46 0.00 - 0.85 K/uL    Eos (Absolute) 0.27 0.00 - 0.51 K/uL    Baso (Absolute) 0.11 0.00 - 0.12 K/uL    Immature Granulocytes (abs) 0.02 0.00 - 0.11 K/uL    NRBC (Absolute) 0.00 K/uL   Urinalysis, culture if indicated    Specimen: Urine   Result Value Ref  Range    Color Yellow     Character Clear     Specific Gravity 1.020 <1.035    Ph 7.0 5.0 - 8.0    Glucose Negative Negative mg/dL    Ketones Negative Negative mg/dL    Protein 100 (A) Negative mg/dL    Bilirubin Negative Negative    Urobilinogen, Urine 0.2 Negative    Nitrite Negative Negative    Leukocyte Esterase Negative Negative    Occult Blood Negative Negative    Micro Urine Req Microscopic    PHOSPHORUS   Result Value Ref Range    Phosphorus 3.6 2.5 - 4.5 mg/dL   MAGNESIUM   Result Value Ref Range    Magnesium 2.3 1.5 - 2.5 mg/dL   HEMOGLOBIN A1C   Result Value Ref Range    Glycohemoglobin 6.8 (H) 4.0 - 5.6 %    Est Avg Glucose 148 mg/dL   BETA-HYDROXYBUTYRIC ACID   Result Value Ref Range    beta-Hydroxybutyric Acid 0.48 (H) 0.02 - 0.27 mmol/L   TROPONIN   Result Value Ref Range    Troponin T 21 (H) 6 - 19 ng/L   DIAGNOSTIC ALCOHOL   Result Value Ref Range    Diagnostic Alcohol <10.1 0.0 - 10.0 mg/dL   proBrain Natriuretic Peptide, NT   Result Value Ref Range    NT-proBNP 10 0 - 125 pg/mL   Comp Metabolic Panel   Result Value Ref Range    Sodium 134 (L) 135 - 145 mmol/L    Potassium 4.0 3.6 - 5.5 mmol/L    Chloride 96 96 - 112 mmol/L    Co2 27 20 - 33 mmol/L    Anion Gap 11.0 7.0 - 16.0    Glucose 117 (H) 65 - 99 mg/dL    Bun 36 (H) 8 - 22 mg/dL    Creatinine 1.02 0.50 - 1.40 mg/dL    Calcium 9.8 8.5 - 10.5 mg/dL    AST(SGOT) 24 12 - 45 U/L    ALT(SGPT) 7 2 - 50 U/L    Alkaline Phosphatase 105 (H) 30 - 99 U/L    Total Bilirubin 0.5 0.1 - 1.5 mg/dL    Albumin 4.8 3.2 - 4.9 g/dL    Total Protein 8.4 (H) 6.0 - 8.2 g/dL    Globulin 3.6 (H) 1.9 - 3.5 g/dL    A-G Ratio 1.3 g/dL   ESTIMATED GFR   Result Value Ref Range    GFR If African American >60 >60 mL/min/1.73 m 2    GFR If Non African American >60 >60 mL/min/1.73 m 2   Sed Rate   Result Value Ref Range    Sed Rate Westergren 2 0 - 20 mm/hour   CRP QUANTITIVE (NON-CARDIAC)   Result Value Ref Range    Stat C-Reactive Protein <0.30 0.00 - 0.75 mg/dL   URINE  MICROSCOPIC (W/UA)   Result Value Ref Range    WBC Rare (A) /hpf    RBC Rare /hpf    Bacteria Negative None /hpf    Epithelial Cells Negative /hpf   LACTIC ACID   Result Value Ref Range    Lactic Acid 1.4 0.5 - 2.0 mmol/L   BLOOD CULTURE    Specimen: Peripheral; Blood   Result Value Ref Range    Significant Indicator NEG     Source BLD     Site PERIPHERAL     Culture Result       No Growth  Note: Blood cultures are incubated for 5 days and  are monitored continuously.Positive blood cultures  are called to the RN and reported as soon as  they are identified.     BLOOD CULTURE    Specimen: Peripheral; Blood   Result Value Ref Range    Significant Indicator NEG     Source BLD     Site PERIPHERAL     Culture Result       No Growth  Note: Blood cultures are incubated for 5 days and  are monitored continuously.Positive blood cultures  are called to the RN and reported as soon as  they are identified.     URINE DRUG SCREEN   Result Value Ref Range    Amphetamines Urine Negative Negative    Barbiturates Negative Negative    Benzodiazepines Negative Negative    Cocaine Metabolite Negative Negative    Methadone Negative Negative    Opiates Negative Negative    Oxycodone Negative Negative    Phencyclidine -Pcp Negative Negative    Propoxyphene Negative Negative    Cannabinoid Metab Negative Negative   SARS-CoV-2 PCR (24 hour In-House): Collect NP swab in [facility]    Specimen: Respirate   Result Value Ref Range    SARS-CoV-2 Source NP Swab     SARS-CoV-2 by PCR NotDetected    CBC WITH DIFFERENTIAL   Result Value Ref Range    WBC 8.4 4.8 - 10.8 K/uL    RBC 5.15 4.70 - 6.10 M/uL    Hemoglobin 14.4 14.0 - 18.0 g/dL    Hematocrit 44.1 42.0 - 52.0 %    MCV 85.6 81.4 - 97.8 fL    MCH 28.0 27.0 - 33.0 pg    MCHC 32.7 (L) 33.7 - 35.3 g/dL    RDW 40.1 35.9 - 50.0 fL    Platelet Count 191 164 - 446 K/uL    MPV 9.6 9.0 - 12.9 fL    Neutrophils-Polys 64.00 44.00 - 72.00 %    Lymphocytes 24.10 22.00 - 41.00 %    Monocytes 7.40 0.00 - 13.40 %     Eosinophils 3.40 0.00 - 6.90 %    Basophils 0.70 0.00 - 1.80 %    Immature Granulocytes 0.40 0.00 - 0.90 %    Nucleated RBC 0.00 /100 WBC    Neutrophils (Absolute) 5.39 1.82 - 7.42 K/uL    Lymphs (Absolute) 2.03 1.00 - 4.80 K/uL    Monos (Absolute) 0.62 0.00 - 0.85 K/uL    Eos (Absolute) 0.29 0.00 - 0.51 K/uL    Baso (Absolute) 0.06 0.00 - 0.12 K/uL    Immature Granulocytes (abs) 0.03 0.00 - 0.11 K/uL    NRBC (Absolute) 0.00 K/uL   Comp Metabolic Panel   Result Value Ref Range    Sodium 140 135 - 145 mmol/L    Potassium 4.0 3.6 - 5.5 mmol/L    Chloride 105 96 - 112 mmol/L    Co2 25 20 - 33 mmol/L    Anion Gap 10.0 7.0 - 16.0    Glucose 111 (H) 65 - 99 mg/dL    Bun 21 8 - 22 mg/dL    Creatinine 0.91 0.50 - 1.40 mg/dL    Calcium 9.3 8.5 - 10.5 mg/dL    AST(SGOT) 20 12 - 45 U/L    ALT(SGPT) 29 2 - 50 U/L    Alkaline Phosphatase 92 30 - 99 U/L    Total Bilirubin 0.6 0.1 - 1.5 mg/dL    Albumin 4.2 3.2 - 4.9 g/dL    Total Protein 7.2 6.0 - 8.2 g/dL    Globulin 3.0 1.9 - 3.5 g/dL    A-G Ratio 1.4 g/dL   MAGNESIUM   Result Value Ref Range    Magnesium 2.5 1.5 - 2.5 mg/dL   ESTIMATED GFR   Result Value Ref Range    GFR If African American >60 >60 mL/min/1.73 m 2    GFR If Non African American >60 >60 mL/min/1.73 m 2   EKG (NOW)   Result Value Ref Range    Report       [facility] Emergency Dept.    Test Date:  2021  Pt Name:    ALBERT VEGA             Department: ER  MRN:        [id number]                      Room:        05  Gender:     Male                         Technician: [id number]  :        1955                   Requested By:ER TRIAGE PROTOCOL  Order #:    [id number]                    Reading MD: BIJAN DIAZ MD    Measurements  Intervals                                Axis  Rate:       85                           P:          77  MA:         128                          QRS:        78  QRSD:       174                          T:          36  QT:         408  QTc:         486    Interpretive Statements  SINUS RHYTHM  NONSPECIFIC INTRAVENTRICULAR CONDUCTION DELAY  PROBABLE LEFT VENTRICULAR HYPERTROPHY  BASELINE WANDER IN LEAD(S) II,III,aVF,V4  No previous ECG available for comparison  Electronically Signed On 4- 8:49:42 PDT by BIJAN DIAZ MD     POCT glucose device results   Result Value Ref Range    Glucose - Accu-Ck 109 (H) 65 - 99 mg/dL   POCT glucose device results   Result Value Ref Range    Glucose - Accu-Ck 96 65 - 99 mg/dL   POCT glucose device results   Result Value Ref Range    Glucose - Accu-Ck 107 (H) 65 - 99 mg/dL   POCT glucose device results   Result Value Ref Range    Glucose - Accu-Ck 88 65 - 99 mg/dL       Imaging:   DX-HAND 3+ LEFT   Final Result      No evidence of acute fracture or dislocation.      Degenerative changes.      No evidence of erosive arthropathy.      DX-HAND 3+ RIGHT   Final Result      No evidence of acute fracture or dislocation.      No evidence of erosive arthropathy.      Degenerative changes.         US-EXTREMITY VENOUS LOWER BILAT   Final Result      DX-CHEST-PORTABLE (1 VIEW)   Final Result      No acute cardiopulmonary process is seen.          Problem Representation:   The patient is a 65-year-old male with a past medical history of diabetes (last A1c 6.8), Parkinson's (diagnosed at an outside hospital a few months ago on Sinemet), bilateral hand arthritis, and chronic lower extremity edema.  He presented to the hospital for hand pain, bilateral lower extremity edema, and failure to thrive.    * Failure to thrive in adult  Assessment & Plan  The patient is presenting with failure to thrive.  As per the family, the patient has some difficulty at home.  They are concerned that he may be having some neurocognitive issues at this time.    Plan:  -PT recommended SNF, OT stated that the patient was in a lot of pain during the evaluation.  We will ask OT to reevaluate now that the patient's pain is improved.  -Neurocognitive evaluation  still pending  -Social work consult    Venous stasis dermatitis of both lower extremities  Assessment & Plan  The rash that the patient has is likely venous stasis dermatitis in his bilateral lower extremities.  Edema is likely related to venous insufficiency as well.  The patient had a negative BNP, negative DVT ultrasound, strong pulses on exam, negative ESR and CRP, and as per the patient this has been chronic and ongoing for years.  His edema had improved this morning as well after he had been laying down all night.  This makes it much less likely to be heart failure and more likely to be related to venous stasis.    Plan:  -Compression stockings ordered.  This is improving with elevation as well.      Parkinson disease ([facility])  Assessment & Plan  Per the patient, he was diagnosed with Parkinson's disease in [place] a few months back.  The patient is on Sinemet currently 4 times a day.    Plan:  -Continue Sinemet.  -The patient will need to be followed as an outpatient by his primary care provider for further work-up of this.    Osteoarthritis of both hands  Assessment & Plan  The patient has a history of chronic bilateral hand pain.  He worked as a  for a while.  His x-rays are consistent with osteoarthritis.  ESR and CRP were negative.    Plan:  -Added IcyHot for arthritic pain  -Continue scheduled Tylenol every 6 hours  -Added tramadol as needed for pain  -Patient was also on meloxicam at home.  However, we will just use tramadol inpatient for now.    DM (diabetes mellitus) ([facility])  Assessment & Plan  The patient has a history of diabetes mellitus on Metformin.  Last A1c was this admission and it was 6.8.    Plan:  -Continue sliding scale insulin and Accu-Cheks  -Hypoglycemia protocol      DVT ppx: Lovenox  Diet: Diabetic  Tubes/Lines: PIV  Code status: FULL    Navya Mccormick M.D.

## 2021-04-27 NOTE — DIETARY
"Nutrition services: Day 0 of admit.  Dinah Mathur is a 65 y.o. male with admitting DX of cellulitis.   Consult received for Failure to Thrive dx.     Met w/ pt at bedside. States he has been eating well, typically 3 meals/day and has access to food. Unsure of usual weight but denies any recent weight loss. Reports very good appetite/intake since admit.     Assessment:  Height: 175.3 cm (5' 9\")  Weight: 76.9 kg (169 lb 8.5 oz)  Body mass index is 25.04 kg/m²., BMI classification: Overweight.   Diet/Intake: Consistent CHO; Average 50% or greater x 4 meals since admit.     Evaluation:   1. Pt homeless, with hx of DM. Admitted for B/L leg swelling.   2. Labs: HgbA1c 6.8%; POC glucose:  x 24 hrs.   3. Edema: 2+ pitting (RUE, LUE, LLE).   4. No weight hx per chart review.   5. Appears well-nourished.     Malnutrition Risk: At risk d/t edema but unable to meet criteria at this time.     Recommendations/Plan:  1. Encourage intake of meals.   2. Document intake of all meals as % taken in ADL's to provide interdisciplinary communication across all shifts.   3. Monitor weight.  4. Nutrition rep will continue to see patient for ongoing meal and snack preferences.     Pt with no acute nutrition needs at this time, RD to follow per department policy. Please consult prn.       "

## 2021-04-27 NOTE — CARE PLAN
Problem: Pain Management  Goal: Pain level will decrease to patient's comfort goal  Outcome: PROGRESSING AS EXPECTED  Note: Intervention: discussed pain comfort goal, administered medication per MAR, offered heating pack  Outcome: pt reports decrease in pain      Problem: Skin Integrity  Goal: Risk for impaired skin integrity will decrease  Outcome: PROGRESSING AS EXPECTED  Note: Intervention: full skin check, mepilex to elbows and heels, barrier paste to sacrum  Outcome: no new pressure injuries noted at this time

## 2021-04-28 LAB
GLUCOSE BLD-MCNC: 103 MG/DL (ref 65–99)
GLUCOSE BLD-MCNC: 108 MG/DL (ref 65–99)

## 2021-04-28 PROCEDURE — A9270 NON-COVERED ITEM OR SERVICE: HCPCS | Performed by: STUDENT IN AN ORGANIZED HEALTH CARE EDUCATION/TRAINING PROGRAM

## 2021-04-28 PROCEDURE — 97530 THERAPEUTIC ACTIVITIES: CPT

## 2021-04-28 PROCEDURE — 99224 PR SUBSEQUENT OBSERVATION CARE,LEVEL I: CPT | Mod: GC | Performed by: INTERNAL MEDICINE

## 2021-04-28 PROCEDURE — 82962 GLUCOSE BLOOD TEST: CPT

## 2021-04-28 PROCEDURE — 96372 THER/PROPH/DIAG INJ SC/IM: CPT

## 2021-04-28 PROCEDURE — 97535 SELF CARE MNGMENT TRAINING: CPT | Mod: XU

## 2021-04-28 PROCEDURE — 700102 HCHG RX REV CODE 250 W/ 637 OVERRIDE(OP): Performed by: STUDENT IN AN ORGANIZED HEALTH CARE EDUCATION/TRAINING PROGRAM

## 2021-04-28 PROCEDURE — G0378 HOSPITAL OBSERVATION PER HR: HCPCS

## 2021-04-28 PROCEDURE — 700111 HCHG RX REV CODE 636 W/ 250 OVERRIDE (IP): Performed by: STUDENT IN AN ORGANIZED HEALTH CARE EDUCATION/TRAINING PROGRAM

## 2021-04-28 PROCEDURE — 92523 SPEECH SOUND LANG COMPREHEN: CPT

## 2021-04-28 RX ADMIN — ACETAMINOPHEN 650 MG: 325 TABLET, FILM COATED ORAL at 13:22

## 2021-04-28 RX ADMIN — GABAPENTIN 300 MG: 300 CAPSULE ORAL at 17:32

## 2021-04-28 RX ADMIN — GABAPENTIN 300 MG: 300 CAPSULE ORAL at 04:42

## 2021-04-28 RX ADMIN — ASPIRIN 81 MG: 81 TABLET, COATED ORAL at 04:42

## 2021-04-28 RX ADMIN — ACETAMINOPHEN 650 MG: 325 TABLET, FILM COATED ORAL at 23:35

## 2021-04-28 RX ADMIN — ACETAMINOPHEN 650 MG: 325 TABLET, FILM COATED ORAL at 05:47

## 2021-04-28 RX ADMIN — ROSUVASTATIN CALCIUM 40 MG: 20 TABLET, FILM COATED ORAL at 04:42

## 2021-04-28 RX ADMIN — CARBIDOPA AND LEVODOPA 1 TABLET: 25; 250 TABLET ORAL at 17:32

## 2021-04-28 RX ADMIN — TAMSULOSIN HYDROCHLORIDE 0.4 MG: 0.4 CAPSULE ORAL at 04:43

## 2021-04-28 RX ADMIN — CARBIDOPA AND LEVODOPA 1 TABLET: 25; 250 TABLET ORAL at 04:42

## 2021-04-28 RX ADMIN — ACETAMINOPHEN 650 MG: 325 TABLET, FILM COATED ORAL at 17:32

## 2021-04-28 RX ADMIN — CARBIDOPA AND LEVODOPA 1 TABLET: 25; 250 TABLET ORAL at 23:35

## 2021-04-28 RX ADMIN — DOCUSATE SODIUM 50 MG AND SENNOSIDES 8.6 MG 2 TABLET: 8.6; 5 TABLET, FILM COATED ORAL at 17:32

## 2021-04-28 RX ADMIN — DOCUSATE SODIUM 50 MG AND SENNOSIDES 8.6 MG 2 TABLET: 8.6; 5 TABLET, FILM COATED ORAL at 04:42

## 2021-04-28 RX ADMIN — ACETAMINOPHEN 650 MG: 325 TABLET, FILM COATED ORAL at 00:40

## 2021-04-28 RX ADMIN — ENOXAPARIN SODIUM 40 MG: 40 INJECTION SUBCUTANEOUS at 04:43

## 2021-04-28 RX ADMIN — CARBIDOPA AND LEVODOPA 1 TABLET: 25; 250 TABLET ORAL at 13:21

## 2021-04-28 ASSESSMENT — LIFESTYLE VARIABLES: SUBSTANCE_ABUSE: 0

## 2021-04-28 ASSESSMENT — COGNITIVE AND FUNCTIONAL STATUS - GENERAL
SUGGESTED CMS G CODE MODIFIER MOBILITY: CJ
CLIMB 3 TO 5 STEPS WITH RAILING: A LITTLE
TURNING FROM BACK TO SIDE WHILE IN FLAT BAD: A LITTLE
MOVING TO AND FROM BED TO CHAIR: A LITTLE
MOVING FROM LYING ON BACK TO SITTING ON SIDE OF FLAT BED: A LITTLE
MOBILITY SCORE: 20

## 2021-04-28 ASSESSMENT — PAIN DESCRIPTION - PAIN TYPE
TYPE: NEUROPATHIC PAIN

## 2021-04-28 ASSESSMENT — ENCOUNTER SYMPTOMS
VOMITING: 0
INSOMNIA: 0
COUGH: 0
CHILLS: 0
SORE THROAT: 0
WEAKNESS: 0
SHORTNESS OF BREATH: 0
HEADACHES: 0
FALLS: 0
FEVER: 0
PALPITATIONS: 0
NAUSEA: 0

## 2021-04-28 ASSESSMENT — GAIT ASSESSMENTS
DEVIATION: DECREASED BASE OF SUPPORT;DECREASED HEEL STRIKE;DECREASED TOE OFF;OTHER (COMMENT)
GAIT LEVEL OF ASSIST: MODIFIED INDEPENDENT
DISTANCE (FEET): 120
ASSISTIVE DEVICE: FRONT WHEEL WALKER

## 2021-04-28 NOTE — DISCHARGE PLANNING
Medical Social Work    Anticipated Discharge Disposition: Skilled Nursing Facility (SNF)    Action:  LSW was notified by MD that family meeting scheduled for today at 1030 to discuss dc plan.  LSW then informed that SLP and PT recommend SNF placement. Pt & family agreeable to SNF and pt can return back to brother's home after SNF.       LSW met w/ pt's brother, Florinda Mathur ([phone]), and brother's wife, Jarad Mathur ([phone]) to discuss resources and discharge planning.  [facility]     · LSW explained to pt and family that pt needs to be independent to stay at the shelter and per therapy notes the pt is at supervision level for basic self-care, mobility, and transfers w/ FWW use and cannot stay at the shelter.  · Pt makes $1,000/month SSI and has Medicaid FFS and pt agreeable to going to a group home.    · LSW explained Group Home Waiver process and pt and family wanting to start process.   · LSW assisted pt and family with completing Group Home Waiver application and LSW emailed completed application to: [email or web address].    · LSW explained that a  from Aging & Disability will be assigned to assist w/ placement and explained that it could take 3-4 months for the group home waiver and group home placement to occur.    · Family verbalized understanding of group home process and pt's brother stated that they could provide care to the pt after SNF up until pt is placed in a group home.      LSW discussed SNF choice w/ pt and pt's family and all are agreeable to a blanket referral being sent.    · Choice faxed to [facility].     Barriers to Discharge: SNF acceptance and bed availability.    Plan: LSW will continue to follow for SNF acceptance.

## 2021-04-28 NOTE — PROGRESS NOTES
Daily Progress Note:     Date of Service: 4/28/2021  Primary Team: [facility] IM Yellow Team   Attending: Opal Zapien M.D.   Senior Resident: Navya Mccormick M.D.  Contact:  [phone]    ID:   65-year-old male with a history of chronic leg swelling as well as bilateral hand pain who is presenting with bilateral lower extremity edema.     Interval Update:  The patient is still having lower extremity numbness and tingling. States that pain is improved since admission. The patient had a neurocog eval this AM that showed that he needs cognitive therapy that should be done in post-acute placement after discharge. However, he does not have a solid dispo plan after SNF. He can possibly go back to his brother's house but that is also not a long-term solution.  Social work is working on trying to placement for group home, since he is not fully independent which unqualifies him to live at the shelter.     Consultants/Specialty:  None    Review of Systems:    Review of Systems   Constitutional: Negative for chills and fever.   HENT: Negative for congestion and sore throat.    Respiratory: Negative for cough and shortness of breath.    Cardiovascular: Negative for chest pain and palpitations.   Gastrointestinal: Negative for nausea and vomiting.   Genitourinary: Negative for dysuria and urgency.   Musculoskeletal: Positive for joint pain. Negative for falls.   Skin: Negative for itching and rash.   Neurological: Negative for weakness and headaches.   Psychiatric/Behavioral: Negative for substance abuse. The patient does not have insomnia.        Objective Data:   Physical Exam:   Vitals:   Temp:  [36.1 °C (97 °F)-36.2 °C (97.1 °F)] 36.1 °C (97 °F)  Pulse:  [51-82] 63  Resp:  [16-18] 16  BP: (118-139)/(62-97) 118/62  SpO2:  [94 %-98 %] 94 %     Physical Exam  Vitals and nursing note reviewed.   Constitutional:       General: He is not in acute distress.     Appearance: He is not toxic-appearing.   HENT:      Head: Normocephalic and  atraumatic.   Eyes:      General: No scleral icterus.     Extraocular Movements: Extraocular movements intact.      Pupils: Pupils are equal, round, and reactive to light.   Cardiovascular:      Rate and Rhythm: Normal rate and regular rhythm.      Heart sounds: No murmur. No gallop.    Pulmonary:      Effort: Pulmonary effort is normal. No respiratory distress.      Breath sounds: Normal breath sounds.   Abdominal:      General: Abdomen is flat. There is no distension.      Palpations: Abdomen is soft.      Tenderness: There is no abdominal tenderness.   Musculoskeletal:      Right lower leg: No edema.      Left lower leg: No edema.      Comments: Trigger finger of the left right finger.    Skin:     General: Skin is warm and dry.      Capillary Refill: Capillary refill takes less than 2 seconds.      Comments: No open wounds, redness, warmth in bilateral lower extremities.  There is discoloration consistent with changes of venous stasis dermatitis.   Neurological:      General: No focal deficit present.      Mental Status: He is alert.   Psychiatric:         Mood and Affect: Mood normal.         Behavior: Behavior normal.       Labs:   Results for orders placed or performed during the hospital encounter of 04/25/21   CBC w/ Differential   Result Value Ref Range    WBC 8.9 4.8 - 10.8 K/uL    RBC 5.34 4.70 - 6.10 M/uL    Hemoglobin 15.2 14.0 - 18.0 g/dL    Hematocrit 46.8 42.0 - 52.0 %    MCV 87.6 81.4 - 97.8 fL    MCH 28.5 27.0 - 33.0 pg    MCHC 32.5 (L) 33.7 - 35.3 g/dL    RDW 41.1 35.9 - 50.0 fL    Platelet Count 177 164 - 446 K/uL    MPV 9.7 9.0 - 12.9 fL    Neutrophils-Polys 68.60 44.00 - 72.00 %    Lymphocytes 21.80 (L) 22.00 - 41.00 %    Monocytes 5.20 0.00 - 13.40 %    Eosinophils 3.00 0.00 - 6.90 %    Basophils 1.20 0.00 - 1.80 %    Immature Granulocytes 0.20 0.00 - 0.90 %    Nucleated RBC 0.00 /100 WBC    Neutrophils (Absolute) 6.07 1.82 - 7.42 K/uL    Lymphs (Absolute) 1.93 1.00 - 4.80 K/uL    Monos  (Absolute) 0.46 0.00 - 0.85 K/uL    Eos (Absolute) 0.27 0.00 - 0.51 K/uL    Baso (Absolute) 0.11 0.00 - 0.12 K/uL    Immature Granulocytes (abs) 0.02 0.00 - 0.11 K/uL    NRBC (Absolute) 0.00 K/uL   Urinalysis, culture if indicated    Specimen: Urine   Result Value Ref Range    Color Yellow     Character Clear     Specific Gravity 1.020 <1.035    Ph 7.0 5.0 - 8.0    Glucose Negative Negative mg/dL    Ketones Negative Negative mg/dL    Protein 100 (A) Negative mg/dL    Bilirubin Negative Negative    Urobilinogen, Urine 0.2 Negative    Nitrite Negative Negative    Leukocyte Esterase Negative Negative    Occult Blood Negative Negative    Micro Urine Req Microscopic    PHOSPHORUS   Result Value Ref Range    Phosphorus 3.6 2.5 - 4.5 mg/dL   MAGNESIUM   Result Value Ref Range    Magnesium 2.3 1.5 - 2.5 mg/dL   HEMOGLOBIN A1C   Result Value Ref Range    Glycohemoglobin 6.8 (H) 4.0 - 5.6 %    Est Avg Glucose 148 mg/dL   BETA-HYDROXYBUTYRIC ACID   Result Value Ref Range    beta-Hydroxybutyric Acid 0.48 (H) 0.02 - 0.27 mmol/L   TROPONIN   Result Value Ref Range    Troponin T 21 (H) 6 - 19 ng/L   DIAGNOSTIC ALCOHOL   Result Value Ref Range    Diagnostic Alcohol <10.1 0.0 - 10.0 mg/dL   proBrain Natriuretic Peptide, NT   Result Value Ref Range    NT-proBNP 10 0 - 125 pg/mL   Comp Metabolic Panel   Result Value Ref Range    Sodium 134 (L) 135 - 145 mmol/L    Potassium 4.0 3.6 - 5.5 mmol/L    Chloride 96 96 - 112 mmol/L    Co2 27 20 - 33 mmol/L    Anion Gap 11.0 7.0 - 16.0    Glucose 117 (H) 65 - 99 mg/dL    Bun 36 (H) 8 - 22 mg/dL    Creatinine 1.02 0.50 - 1.40 mg/dL    Calcium 9.8 8.5 - 10.5 mg/dL    AST(SGOT) 24 12 - 45 U/L    ALT(SGPT) 7 2 - 50 U/L    Alkaline Phosphatase 105 (H) 30 - 99 U/L    Total Bilirubin 0.5 0.1 - 1.5 mg/dL    Albumin 4.8 3.2 - 4.9 g/dL    Total Protein 8.4 (H) 6.0 - 8.2 g/dL    Globulin 3.6 (H) 1.9 - 3.5 g/dL    A-G Ratio 1.3 g/dL   ESTIMATED GFR   Result Value Ref Range    GFR If  >60  >60 mL/min/1.73 m 2    GFR If Non African American >60 >60 mL/min/1.73 m 2   Sed Rate   Result Value Ref Range    Sed Rate Westergren 2 0 - 20 mm/hour   CRP QUANTITIVE (NON-CARDIAC)   Result Value Ref Range    Stat C-Reactive Protein <0.30 0.00 - 0.75 mg/dL   URINE MICROSCOPIC (W/UA)   Result Value Ref Range    WBC Rare (A) /hpf    RBC Rare /hpf    Bacteria Negative None /hpf    Epithelial Cells Negative /hpf   LACTIC ACID   Result Value Ref Range    Lactic Acid 1.4 0.5 - 2.0 mmol/L   BLOOD CULTURE    Specimen: Peripheral; Blood   Result Value Ref Range    Significant Indicator NEG     Source BLD     Site PERIPHERAL     Culture Result       No Growth  Note: Blood cultures are incubated for 5 days and  are monitored continuously.Positive blood cultures  are called to the RN and reported as soon as  they are identified.     BLOOD CULTURE    Specimen: Peripheral; Blood   Result Value Ref Range    Significant Indicator NEG     Source BLD     Site PERIPHERAL     Culture Result       No Growth  Note: Blood cultures are incubated for 5 days and  are monitored continuously.Positive blood cultures  are called to the RN and reported as soon as  they are identified.     URINE DRUG SCREEN   Result Value Ref Range    Amphetamines Urine Negative Negative    Barbiturates Negative Negative    Benzodiazepines Negative Negative    Cocaine Metabolite Negative Negative    Methadone Negative Negative    Opiates Negative Negative    Oxycodone Negative Negative    Phencyclidine -Pcp Negative Negative    Propoxyphene Negative Negative    Cannabinoid Metab Negative Negative   SARS-CoV-2 PCR (24 hour In-House): Collect NP swab in [facility]    Specimen: Respirate   Result Value Ref Range    SARS-CoV-2 Source NP Swab     SARS-CoV-2 by PCR NotDetected    CBC WITH DIFFERENTIAL   Result Value Ref Range    WBC 8.4 4.8 - 10.8 K/uL    RBC 5.15 4.70 - 6.10 M/uL    Hemoglobin 14.4 14.0 - 18.0 g/dL    Hematocrit 44.1 42.0 - 52.0 %    MCV 85.6 81.4 - 97.8 fL     MCH 28.0 27.0 - 33.0 pg    MCHC 32.7 (L) 33.7 - 35.3 g/dL    RDW 40.1 35.9 - 50.0 fL    Platelet Count 191 164 - 446 K/uL    MPV 9.6 9.0 - 12.9 fL    Neutrophils-Polys 64.00 44.00 - 72.00 %    Lymphocytes 24.10 22.00 - 41.00 %    Monocytes 7.40 0.00 - 13.40 %    Eosinophils 3.40 0.00 - 6.90 %    Basophils 0.70 0.00 - 1.80 %    Immature Granulocytes 0.40 0.00 - 0.90 %    Nucleated RBC 0.00 /100 WBC    Neutrophils (Absolute) 5.39 1.82 - 7.42 K/uL    Lymphs (Absolute) 2.03 1.00 - 4.80 K/uL    Monos (Absolute) 0.62 0.00 - 0.85 K/uL    Eos (Absolute) 0.29 0.00 - 0.51 K/uL    Baso (Absolute) 0.06 0.00 - 0.12 K/uL    Immature Granulocytes (abs) 0.03 0.00 - 0.11 K/uL    NRBC (Absolute) 0.00 K/uL   Comp Metabolic Panel   Result Value Ref Range    Sodium 140 135 - 145 mmol/L    Potassium 4.0 3.6 - 5.5 mmol/L    Chloride 105 96 - 112 mmol/L    Co2 25 20 - 33 mmol/L    Anion Gap 10.0 7.0 - 16.0    Glucose 111 (H) 65 - 99 mg/dL    Bun 21 8 - 22 mg/dL    Creatinine 0.91 0.50 - 1.40 mg/dL    Calcium 9.3 8.5 - 10.5 mg/dL    AST(SGOT) 20 12 - 45 U/L    ALT(SGPT) 29 2 - 50 U/L    Alkaline Phosphatase 92 30 - 99 U/L    Total Bilirubin 0.6 0.1 - 1.5 mg/dL    Albumin 4.2 3.2 - 4.9 g/dL    Total Protein 7.2 6.0 - 8.2 g/dL    Globulin 3.0 1.9 - 3.5 g/dL    A-G Ratio 1.4 g/dL   MAGNESIUM   Result Value Ref Range    Magnesium 2.5 1.5 - 2.5 mg/dL   ESTIMATED GFR   Result Value Ref Range    GFR If African American >60 >60 mL/min/1.73 m 2    GFR If Non African American >60 >60 mL/min/1.73 m 2   EKG (NOW)   Result Value Ref Range    Report       [facility]    Test Date:  2021  Pt Name:    ALBERT VEGA             Department: ER  MRN:        [id number]                      Room:       [facility]  Gender:     Male                         Technician: [id number]  :        1955                   Requested By:ER TRIAGE PROTOCOL  Order #:    [id number]                    Reading MD: BIJAN DIAZ,  MD    Measurements  Intervals                                Axis  Rate:       85                           P:          77  VA:         128                          QRS:        78  QRSD:       174                          T:          36  QT:         408  QTc:        486    Interpretive Statements  SINUS RHYTHM  NONSPECIFIC INTRAVENTRICULAR CONDUCTION DELAY  PROBABLE LEFT VENTRICULAR HYPERTROPHY  BASELINE WANDER IN LEAD(S) II,III,aVF,V4  No previous ECG available for comparison  Electronically Signed On 4- 8:49:42 PDT by BIJAN DIAZ MD     POCT glucose device results   Result Value Ref Range    Glucose - Accu-Ck 109 (H) 65 - 99 mg/dL   POCT glucose device results   Result Value Ref Range    Glucose - Accu-Ck 96 65 - 99 mg/dL   POCT glucose device results   Result Value Ref Range    Glucose - Accu-Ck 107 (H) 65 - 99 mg/dL   POCT glucose device results   Result Value Ref Range    Glucose - Accu-Ck 88 65 - 99 mg/dL   POCT glucose device results   Result Value Ref Range    Glucose - Accu-Ck 130 (H) 65 - 99 mg/dL   POCT glucose device results   Result Value Ref Range    Glucose - Accu-Ck 108 (H) 65 - 99 mg/dL   POCT glucose device results   Result Value Ref Range    Glucose - Accu-Ck 103 (H) 65 - 99 mg/dL       Imaging:   DX-HAND 3+ LEFT   Final Result      No evidence of acute fracture or dislocation.      Degenerative changes.      No evidence of erosive arthropathy.      DX-HAND 3+ RIGHT   Final Result      No evidence of acute fracture or dislocation.      No evidence of erosive arthropathy.      Degenerative changes.         US-EXTREMITY VENOUS LOWER BILAT   Final Result      DX-CHEST-PORTABLE (1 VIEW)   Final Result      No acute cardiopulmonary process is seen.          Problem Representation:   The patient is a 65-year-old male with a past medical history of diabetes (last A1c 6.8), Parkinson's (diagnosed at an outside hospital a few months ago on Sinemet), bilateral hand arthritis, and chronic lower  extremity edema.  He presented to the hospital for hand pain, bilateral lower extremity edema, and failure to thrive.    * Failure to thrive in adult  Assessment & Plan  The patient is presenting with failure to thrive.  As per the family, the patient has some difficulty at home.  They are concerned that he may be having some neurocognitive issues at this time.    Plan:  -PT recommended SNF, OT stated that the patient was in a lot of pain during the evaluation.  We will ask OT to reevaluate now that the patient's pain is improved.  -Neurocognitive eval recommends cognitive therapy at SNF. Working on finding a SNF that can accept him as well as a potential group home to take him after SNF stay.   -Social work is working with the family.    Venous stasis dermatitis of both lower extremities  Assessment & Plan  The rash that the patient has is likely venous stasis dermatitis in his bilateral lower extremities.  Edema is likely related to venous insufficiency as well.  The patient had a negative BNP, negative DVT ultrasound, strong pulses on exam, negative ESR and CRP, and as per the patient this has been chronic and ongoing for years.  His edema had improved this morning as well after he had been laying down all night.  This makes it much less likely to be heart failure and more likely to be related to venous stasis.    Plan:  -Compression stockings ordered.  This is improving with elevation as well.      Parkinson disease (HCC)  Assessment & Plan  Per the patient, he was diagnosed with Parkinson's disease in [place] a few months back.  The patient is on Sinemet currently 4 times a day.    Plan:  -Continue Sinemet.  -The patient will need to be followed as an outpatient by his primary care provider for further work-up of this.    Osteoarthritis of both hands  Assessment & Plan  The patient has a history of chronic bilateral hand pain.  He worked as a  for a while.  His x-rays are consistent with osteoarthritis.   ESR and CRP were negative.    Plan:  -Added IcyHot for arthritic pain  -Continue scheduled Tylenol every 6 hours  -Added tramadol as needed for pain  -Patient was also on meloxicam at home.  However, we will just use tramadol inpatient for now.    DM (diabetes mellitus) ([facility])  Assessment & Plan  The patient has a history of diabetes mellitus on Metformin.  Last A1c was this admission and it was 6.8.    Plan:  - Accuchecks have been fine. Will discontinue      DVT ppx: Lovenox  Diet: Diabetic  Tubes/Lines: PIV  Code status: FULL    Navya Mccormick M.D.

## 2021-04-28 NOTE — THERAPY
"Speech Language Pathology   Cognitive Evaluation      Patient Name: Dinah Mathur  AGE:  65 y.o., SEX:  male  Medical Record #: [id number]  Today's Date: 4/28/2021     Precautions  Precautions: Fall Risk  Comments: BLE pain    Assessment  Patient is a 66 y/o male admitted on 4/25/21 for failure to thrive and B/L leg swelling. He has a PMH significant for homelessness and diabetes as well as history of noncompliance with medication management. Family is concerned for patient's well-being, namely his cognitive status. No prior SLP notes found in this EMR. A cognitive evaluation was requested by MD as part of discharge planning, as family are not able to take patient home.     Patient seen this date for cognitive evaluation. Brother and sister-in-law present for this assessment. Patient awake and alert, endorsing ongoing bilateral hand/leg tingling. SLP administered the CLQT this date for cognitive assessment. Of note, subtests \"Confrontation Naming\" and \"Design Memory\" not administered. Patient completed this assessment in 45 minutes without cues required for attention or participation. The following results summarize patient's cognitive impairment: Mild impairment in attention, severe impairment in memory, executive function, language and visuospatial skills. He required additional time for problem solving tasks, showing cognitive breakdown with more complex tasks. He also showed reduced awareness of errors and became increasingly confused when trying to self-correct. Unable to successfully complete maze tasks accurately or symbol trail task. He is able to recall \"big picture\" details after brief delay, however, recall of details is significantly impaired. Patient also produced minimal responses in generative naming task, although he was able to recall task instructions.   At this time, patient does not demonstrate cognitive function warranted for safe, independent discharge. He will benefit from SNF placement for " initiation of cognitive-linguistic rehabilitation targeting the above mentioned areas for improved independence in ADLs and improved independent safety. This information was reviewed with the patient and family who verbalized understanding and were in agreement.      Plan  1) Initiate cognitive rehabilitation targeting: memory, executive function, language, visuospatial skills.     Recommend Speech Therapy 3 times per week until therapy goals are met for the following treatments:  Cognitive-Linguistic Training and Patient / Family / Caregiver Education.    Discharge Recommendations: Recommend post-acute placement for additional speech therapy services prior to discharge home    Subjective  Patient seen this date for cognitive-linguistic evaluation.      Objective       04/28/21 1003   Charge Group   SLP Speech Language Evaluation Speech Sound Language Comprehension   Cognitive-Linguistic   Level of Consciousness Alert   Outcome Measures   Outcome Measures Utilized CLQT   CLQT (Cognitive Linguistic Quick Test)   Attention 3   Memory 1   Executive Function 1   Language 1   Visuospatial 1   Total 1.4   Composite Severity Rating Severe   Clock Drawing Severity Rating Mild   Short Term Goals   Short Term Goal # 1 Pt will complete simple problem solving tasks with 90% accuracy and min cues   Short Term Goal # 2 Pt will complete short term memory tasks with 80% accuracy and mod cues   Short Term Goal # 3 Patient will complete executive functioning tasks related to ALDs with 80% accuracy and min cues   Education Group   Education Provided Traumatic Brain Injury / Cognitive-Linguistic;Medications / Pain Control;Role of Speech Therapy   TBI / Cog-Ling Patient Response Patient;Family;Acceptance;Demonstration;Verbal Demonstration;Reinforcement Needed   Meds / Pain Control Patient Response Patient;Family;Acceptance;Demonstration;Action Demonstration   Role of SLP Patient Response Patient;Family;Acceptance;Demonstration;Action  Demonstration   Problem List   Problem List Cognitive-Linguistic Deficits;Attention Deficit;Memory Deficit;Verbal Problem Solving Deficits;Executive Function Deficit;Impaired Safety;Impaired Judgement   Anticipated Discharge Needs   Discharge Recommendations Recommend post-acute placement for additional speech therapy services prior to discharge home   Therapy Recommendations Upon DC Comprehension Training;Cognitive-Linguistic Training;Community Re-Integration;Patient / Family / Caregiver Education   Interdisciplinary Plan of Care Collaboration   IDT Collaboration with  Family / Caregiver;Nursing;Physician   Patient Position at End of Therapy In Bed;Call Light within Reach;Tray Table within Reach;Family / Friend in Room;Phone within Reach   Collaboration Comments RN/MD updated with results and recs. SLP discussed findings of evaluation with patient and family as well

## 2021-04-28 NOTE — CARE PLAN
Problem: Pain Management  Goal: Pain level will decrease to patient's comfort goal  Outcome: PROGRESSING AS EXPECTED  Note: Intervention: discussed pain comfort goal, administered medication per MAR  Outcome: pt reports decrease in pain after medication       Problem: Skin Integrity  Goal: Risk for impaired skin integrity will decrease  Outcome: PROGRESSING AS EXPECTED  Note: Intervention: mepilex to elbows and heels, encouraged frequent repositioning, barrier cream to sacrum   Outcome: no new pressure injuries at this time

## 2021-04-28 NOTE — PROGRESS NOTES
Ears: intact, red and blanching  Which preventative measures are in place for the ears?  - encourage pt to remove glasses while sleeping    Elbows:intact, pink and blanching  Which preventative measures are in place for the elbows?  - mepilex in place    Sacrum: small red opening right upper buttock  Which preventative measures are in place for the sacrum?  -site care, barrier cream applied    Heels: red, blanching  Which preventative measures are in place for the heels?  -mepilex in place    Which devices are in place?  PIV  Description of skin under devices: clean/dry/intact  Which preventative measures are in place under devices?   -qshift assessment    Other: lower extremities are swollen, flaky skin with some discoloration

## 2021-04-28 NOTE — PROGRESS NOTES
1 RN skin check    Assessment/description of ears? Intact and blanching  Which preventative measures are in place for the ears?  Pt encouraged to not sleep with glasses and to only use as needed    Assessment/description of elbows? Intact, pink with slight redness blanching  Which preventative measures are in place for the elbows?  Pt able to turn self, ambulates with assistance, pillows for support and positioning    Assessment/description of sacrum? small red opening left upper buttock  Which preventative measures are in place for the sacrum?  Pt able to turn self, ambulates with assistance, pillows for support and positioning, site care provided, barrier cream applied    Assessment/description of heels? Intact,pink, red but blanching  Which preventative measures are in place for the heels?  Pt able to turn self and ambulates with assistance and heels floated with pillows    Which devices are in place? PIV  Description of skin under devices: intact and blanching  Which preventative measures are in place under devices? qshift assessment    Other:  BLE are swollen and flaky with discoloration. Generalized skin is dry. Pt was encouraged to moisturize. Pillows for support and positioning.

## 2021-04-28 NOTE — PROGRESS NOTES
Received report and assumed pt care. Pt is A&Ox4. VSS on RA. C/o mild numbness and tingling in hands and feets but denies intervention at this time. Assessment complete. Fall precautions in place and pt calls appropriately for help. Denies any needs at this time. Family and [facility] team expected to meet at 1000 to discuss pt care; pt aware. Hourly rounding in place.

## 2021-04-28 NOTE — PROGRESS NOTES
Pt is A&Ox4. Pt is resting in bed, no signs of labored breathing or pain. Reports increased numbness and tingling in hands and feet. Pt on RA. Call light & personal belongings within reach, bed in lowest position & locked, and bed alarm is on. Fall precautions in place and education provided on how to use call light. Pt updated on plan of care for the shift. Pt declines any additional needs at this time.

## 2021-04-28 NOTE — PROGRESS NOTES
Ears: pink, blanching bilaterally  Which preventative measures are in place for the ears?  - encouraged pt to remove glasses while sleeping    Elbows: pink, blanching, intact  Which preventative measures are in place for the elbows?  - mepilex in place     Sacrum: red, small opening on right upper buttock  Which preventative measures are in place for the sacrum?  - cleansed,  barrier cream applied    Heels: red, blanching, bilaterally  Which preventative measures are in place for the heels?  - mepilex in place    Which devices are in place? PIV  Description of skin under devices: clean, dry, intact  Which preventative measures are in place under devices?   -pt reminded to  extremity Q2H    Other: lower extremities are swollen, flaky, shiny skin, some discoloation. Face/neck is flaky.

## 2021-04-28 NOTE — DISCHARGE PLANNING
Received Choice form at 1234  Agency/Facility Name: [facility] [place] [facility]  Referral sent per Choice form @ 1246

## 2021-04-28 NOTE — CARE PLAN
Problem: Communication  Goal: The ability to communicate needs accurately and effectively will improve  Outcome: PROGRESSING AS EXPECTED  Note: Pt is A&Ox4. Encouraged to express feelings and ask questions.      Problem: Safety  Goal: Will remain free from injury  Outcome: PROGRESSING AS EXPECTED  Note: Fall precautions in place. Bed in lowest position. Non-skid socks in place. Personal possessions within reach. Mobility sign on door. Bed-alarm on. Call light within reach. Pt educated regarding fall prevention and states understanding.

## 2021-04-28 NOTE — THERAPY
Physical Therapy   Daily Treatment     Patient Name: Dinah Mathur  Age:  65 y.o., Sex:  male  Medical Record #: [id number]  Today's Date: 4/28/2021     Precautions: Fall Risk    Assessment    Pt progressing as expected given deconditioning and medical co-morbidities. He was able to demonstrate hallway ambulation with FWW with Spv and intermittent cueing for improving mechanics/ELIF. Noted he has decreased clearance at RLE>LLE and is at increased risk for falls without appropriate use of FWW and continued skilled PT. See below for details/recs.     Plan    Continue current treatment plan.    DC Equipment Recommendations: Unable to determine at this time    Discharge Recommendations: Given pt's wishes, PLOF, and current presentation would optimally recommend post-acute placement for additional physical therapy services prior to discharge home (pt expresses desire to progress at SNF/rehab to allow pt to return to more I living and decreased reliance on caregivers); however if pt was unable to obtain SNF/rehab placement he does appear capable of dc to prior home environment with intermittent assist from family/caregivers for IADls, though would recommend  PT if to dc to home for progression and decreased reliance on caregivers given current endurance/deficits.       04/28/21 1131   Cognition    Cognition / Consciousness X   Ability To Follow Commands 2 Step   Comments pleasant and cooperative; slighlty flat affect and mild delay; does appear improving sine prior PT visit   Balance   Sitting Balance (Static) Fair +   Sitting Balance (Dynamic) Fair   Standing Balance (Static) Fair   Standing Balance (Dynamic) Fair   Weight Shift Sitting Fair   Weight Shift Standing Fair   Skilled Intervention Verbal Cuing;Compensatory Strategies   Comments no irais LOB during ambulation with FWW; however decreased R>L foot clearance; see  gait   Gait Analysis   Gait Level Of Assist Modified Independent   Assistive Device Front Wheel  Walker   Distance (Feet) 120   # of Times Distance was Traveled 1   Deviation Decreased Base Of Support;Decreased Heel Strike;Decreased Toe Off;Other (Comment)  (dec ann/clearance (RLE appaers less clear than LLE))   # of Stairs Climbed 0   Weight Bearing Status no restrictions   Skilled Intervention Verbal Cuing;Tactile Cuing;Sequencing;Postural Facilitation;Compensatory Strategies   Comments see balance; cueing for clearance; improves somewhat with visual input during ambulation    Bed Mobility    Supine to Sit Modified Independent   Sit to Supine Modified Independent   Scooting Supervised   Skilled Intervention Verbal Cuing;Compensatory Strategies   Comments efforftul though performs without physical assist   Functional Mobility   Sit to Stand Supervised   Bed, Chair, Wheelchair Transfer Supervised   Transfer Method Stand Step   Mobility with FWW   Skilled Intervention Verbal Cuing;Compensatory Strategies   How much difficulty does the patient currently have...   Turning over in bed (including adjusting bedclothes, sheets and blankets)? 3   Sitting down on and standing up from a chair with arms (e.g., wheelchair, bedside commode, etc.) 3   Moving from lying on back to sitting on the side of the bed? 3   How much help from another person does the patient currently need...   Moving to and from a bed to a chair (including a wheelchair)? 4   Need to walk in a hospital room? 4   Climbing 3-5 steps with a railing? 3   6 clicks Mobility Score 20   Activity Tolerance   Sitting in Chair NT   Sitting Edge of Bed functional   Standing at least 5-6 mins   Comments general fatigue and decreased LE endurnace   Patient / Family Goals    Patient / Family Goal #1 get steadier   Goal #1 Outcome Progressing as expected   Short Term Goals    Short Term Goal # 1 Pt will be able to perform sit<>stand/transfer with no AD with SPv in 6tx to promote progression to prior functional baseline   Goal Outcome # 1 goal not met   Short Term  Goal # 2 Pt will ambulate 300' with FWW or no AD with supv within 6 visits in order to return home   Goal Outcome # 2 Goal not met   Education Group   Education Provided Gait Training;Use of Assistive Device   Role of Physical Therapist Patient Response Patient;Acceptance;Explanation;Verbal Demonstration   Gait Training Patient Response Patient;Acceptance;Explanation;Verbal Demonstration;Action Demonstration;Reinforcement Needed   Use of Assistive Device Patient Response Patient;Acceptance;Explanation;Demonstration;Teach Back;Verbal Demonstration;Action Demonstration

## 2021-04-29 PROCEDURE — 700102 HCHG RX REV CODE 250 W/ 637 OVERRIDE(OP): Performed by: STUDENT IN AN ORGANIZED HEALTH CARE EDUCATION/TRAINING PROGRAM

## 2021-04-29 PROCEDURE — A9270 NON-COVERED ITEM OR SERVICE: HCPCS | Performed by: STUDENT IN AN ORGANIZED HEALTH CARE EDUCATION/TRAINING PROGRAM

## 2021-04-29 PROCEDURE — 99224 PR SUBSEQUENT OBSERVATION CARE,LEVEL I: CPT | Mod: GC | Performed by: INTERNAL MEDICINE

## 2021-04-29 PROCEDURE — 700111 HCHG RX REV CODE 636 W/ 250 OVERRIDE (IP): Performed by: STUDENT IN AN ORGANIZED HEALTH CARE EDUCATION/TRAINING PROGRAM

## 2021-04-29 PROCEDURE — 96372 THER/PROPH/DIAG INJ SC/IM: CPT

## 2021-04-29 PROCEDURE — G0378 HOSPITAL OBSERVATION PER HR: HCPCS

## 2021-04-29 RX ADMIN — CARBIDOPA AND LEVODOPA 1 TABLET: 25; 250 TABLET ORAL at 17:40

## 2021-04-29 RX ADMIN — ROSUVASTATIN CALCIUM 40 MG: 20 TABLET, FILM COATED ORAL at 05:59

## 2021-04-29 RX ADMIN — ASPIRIN 81 MG: 81 TABLET, COATED ORAL at 05:59

## 2021-04-29 RX ADMIN — ACETAMINOPHEN 650 MG: 325 TABLET, FILM COATED ORAL at 17:39

## 2021-04-29 RX ADMIN — DOCUSATE SODIUM 50 MG AND SENNOSIDES 8.6 MG 2 TABLET: 8.6; 5 TABLET, FILM COATED ORAL at 05:59

## 2021-04-29 RX ADMIN — CARBIDOPA AND LEVODOPA 1 TABLET: 25; 250 TABLET ORAL at 10:36

## 2021-04-29 RX ADMIN — CARBIDOPA AND LEVODOPA 1 TABLET: 25; 250 TABLET ORAL at 22:20

## 2021-04-29 RX ADMIN — DOCUSATE SODIUM 50 MG AND SENNOSIDES 8.6 MG 2 TABLET: 8.6; 5 TABLET, FILM COATED ORAL at 17:40

## 2021-04-29 RX ADMIN — GABAPENTIN 300 MG: 300 CAPSULE ORAL at 05:59

## 2021-04-29 RX ADMIN — ENOXAPARIN SODIUM 40 MG: 40 INJECTION SUBCUTANEOUS at 05:59

## 2021-04-29 RX ADMIN — ACETAMINOPHEN 650 MG: 325 TABLET, FILM COATED ORAL at 05:59

## 2021-04-29 RX ADMIN — TAMSULOSIN HYDROCHLORIDE 0.4 MG: 0.4 CAPSULE ORAL at 05:59

## 2021-04-29 RX ADMIN — ACETAMINOPHEN 650 MG: 325 TABLET, FILM COATED ORAL at 13:23

## 2021-04-29 RX ADMIN — GABAPENTIN 300 MG: 300 CAPSULE ORAL at 17:39

## 2021-04-29 RX ADMIN — ACETAMINOPHEN 650 MG: 325 TABLET, FILM COATED ORAL at 22:21

## 2021-04-29 RX ADMIN — CARBIDOPA AND LEVODOPA 1 TABLET: 25; 250 TABLET ORAL at 06:03

## 2021-04-29 ASSESSMENT — PAIN DESCRIPTION - PAIN TYPE
TYPE: NEUROPATHIC PAIN
TYPE: NEUROPATHIC PAIN

## 2021-04-29 ASSESSMENT — ENCOUNTER SYMPTOMS
VOMITING: 0
INSOMNIA: 0
SHORTNESS OF BREATH: 0
SORE THROAT: 0
WEAKNESS: 0
FALLS: 0
FEVER: 0
HEADACHES: 0
NAUSEA: 0
COUGH: 0
CHILLS: 0
PALPITATIONS: 0

## 2021-04-29 ASSESSMENT — LIFESTYLE VARIABLES: SUBSTANCE_ABUSE: 0

## 2021-04-29 NOTE — DISCHARGE PLANNING
Agency/Facility Name: [facility]  Spoke To: Herm[facility]  Outcome: Hermilo will check to see if they accept the patient's [employer] insurance plan.

## 2021-04-29 NOTE — CARE PLAN
Problem: Safety  Goal: Will remain free from falls  Note: Pt has hx of Parkinson's. Safety precautions in place. Bed in low position, treaded socks on, personal possessions in reach, call light in reach and bed alarm on. Hourly rounding in place.      Problem: Pain Management  Goal: Pain level will decrease to patient's comfort goal  Note: Pt receiving scheduled Tylenol and gabapentin for neuropathic pain.

## 2021-04-29 NOTE — DISCHARGE PLANNING
Agency/Facility Name: [facility]  Outcome: Left a message for Dawna in admissions.  Awaiting a call back.

## 2021-04-29 NOTE — PROGRESS NOTES
Assessment/description of ears: intact/pink/blanching  Preventative measures in place for the ears: reminded to remove glasses before going to bed    Assessment/description of elbows: intact/pink/red/blanching  Preventative measures in place for the elbows: pt turns self in bed    Assessment/description of sacrum: small/red opening on left upper buttock  Preventative measures in place for the sacrum: barrier cream, pt turns self in bed, pillows for support/positioning    Assessment/description of heels: intact/pink/blanching  Preventative measures in place for the heels: pillows to float heels, turns self in bed    Which devices are in place: PIV  Description of skin under devices: intact/blanching  Preventative measures in place under devices: Qshift assessment    Other:

## 2021-04-29 NOTE — DISCHARGE PLANNING
Agency/Facility Name: [facility]  Outcome: Left a message for admissions.  Awaiting a call back.

## 2021-04-29 NOTE — PROGRESS NOTES
Daily Progress Note:     Date of Service: 4/29/2021  Primary Team: [facility] IM Yellow Team   Attending: Opal Zapien M.D.   Senior Resident: Navya Mccormick M.D.  Contact:  [phone]    ID:   65-year-old male with a history of chronic leg swelling as well as bilateral hand pain who is presenting with bilateral lower extremity edema.     Interval Update:  The patient states that lower extremity pain has improved, but he is still having numbness of his bilateral lower extremities and hands. The patient and his family would like the patient to go to a nursing home. He can go back to the brother's home afterwards until he is placed in a group home.     Consultants/Specialty:  None    Review of Systems:    Review of Systems   Constitutional: Negative for chills and fever.   HENT: Negative for congestion and sore throat.    Respiratory: Negative for cough and shortness of breath.    Cardiovascular: Negative for chest pain and palpitations.   Gastrointestinal: Negative for nausea and vomiting.   Genitourinary: Negative for dysuria and urgency.   Musculoskeletal: Positive for joint pain. Negative for falls.   Skin: Negative for itching and rash.   Neurological: Negative for weakness and headaches.   Psychiatric/Behavioral: Negative for substance abuse. The patient does not have insomnia.        Objective Data:   Physical Exam:   Vitals:   Temp:  [36.1 °C (97 °F)-36.6 °C (97.9 °F)] 36.4 °C (97.6 °F)  Pulse:  [50-69] 56  Resp:  [16] 16  BP: (105-128)/(61-72) 108/72  SpO2:  [95 %-96 %] 96 %     Physical Exam  Vitals and nursing note reviewed.   Constitutional:       General: He is not in acute distress.     Appearance: He is not toxic-appearing.   HENT:      Head: Normocephalic and atraumatic.   Eyes:      General: No scleral icterus.     Extraocular Movements: Extraocular movements intact.      Pupils: Pupils are equal, round, and reactive to light.   Cardiovascular:      Rate and Rhythm: Normal rate and regular rhythm.       Heart sounds: No murmur. No gallop.    Pulmonary:      Effort: Pulmonary effort is normal. No respiratory distress.      Breath sounds: Normal breath sounds.   Abdominal:      General: Abdomen is flat. There is no distension.      Palpations: Abdomen is soft.      Tenderness: There is no abdominal tenderness.   Musculoskeletal:      Right lower leg: No edema.      Left lower leg: No edema.      Comments: Trigger finger of the left right finger.    Skin:     General: Skin is warm and dry.      Capillary Refill: Capillary refill takes less than 2 seconds.      Comments: No open wounds, redness, warmth in bilateral lower extremities.  There is discoloration consistent with changes of venous stasis dermatitis.   Neurological:      General: No focal deficit present.      Mental Status: He is alert.   Psychiatric:         Mood and Affect: Mood normal.         Behavior: Behavior normal.       Labs:   Results for orders placed or performed during the hospital encounter of 04/25/21   CBC w/ Differential   Result Value Ref Range    WBC 8.9 4.8 - 10.8 K/uL    RBC 5.34 4.70 - 6.10 M/uL    Hemoglobin 15.2 14.0 - 18.0 g/dL    Hematocrit 46.8 42.0 - 52.0 %    MCV 87.6 81.4 - 97.8 fL    MCH 28.5 27.0 - 33.0 pg    MCHC 32.5 (L) 33.7 - 35.3 g/dL    RDW 41.1 35.9 - 50.0 fL    Platelet Count 177 164 - 446 K/uL    MPV 9.7 9.0 - 12.9 fL    Neutrophils-Polys 68.60 44.00 - 72.00 %    Lymphocytes 21.80 (L) 22.00 - 41.00 %    Monocytes 5.20 0.00 - 13.40 %    Eosinophils 3.00 0.00 - 6.90 %    Basophils 1.20 0.00 - 1.80 %    Immature Granulocytes 0.20 0.00 - 0.90 %    Nucleated RBC 0.00 /100 WBC    Neutrophils (Absolute) 6.07 1.82 - 7.42 K/uL    Lymphs (Absolute) 1.93 1.00 - 4.80 K/uL    Monos (Absolute) 0.46 0.00 - 0.85 K/uL    Eos (Absolute) 0.27 0.00 - 0.51 K/uL    Baso (Absolute) 0.11 0.00 - 0.12 K/uL    Immature Granulocytes (abs) 0.02 0.00 - 0.11 K/uL    NRBC (Absolute) 0.00 K/uL   Urinalysis, culture if indicated    Specimen: Urine    Result Value Ref Range    Color Yellow     Character Clear     Specific Gravity 1.020 <1.035    Ph 7.0 5.0 - 8.0    Glucose Negative Negative mg/dL    Ketones Negative Negative mg/dL    Protein 100 (A) Negative mg/dL    Bilirubin Negative Negative    Urobilinogen, Urine 0.2 Negative    Nitrite Negative Negative    Leukocyte Esterase Negative Negative    Occult Blood Negative Negative    Micro Urine Req Microscopic    PHOSPHORUS   Result Value Ref Range    Phosphorus 3.6 2.5 - 4.5 mg/dL   MAGNESIUM   Result Value Ref Range    Magnesium 2.3 1.5 - 2.5 mg/dL   HEMOGLOBIN A1C   Result Value Ref Range    Glycohemoglobin 6.8 (H) 4.0 - 5.6 %    Est Avg Glucose 148 mg/dL   BETA-HYDROXYBUTYRIC ACID   Result Value Ref Range    beta-Hydroxybutyric Acid 0.48 (H) 0.02 - 0.27 mmol/L   TROPONIN   Result Value Ref Range    Troponin T 21 (H) 6 - 19 ng/L   DIAGNOSTIC ALCOHOL   Result Value Ref Range    Diagnostic Alcohol <10.1 0.0 - 10.0 mg/dL   proBrain Natriuretic Peptide, NT   Result Value Ref Range    NT-proBNP 10 0 - 125 pg/mL   Comp Metabolic Panel   Result Value Ref Range    Sodium 134 (L) 135 - 145 mmol/L    Potassium 4.0 3.6 - 5.5 mmol/L    Chloride 96 96 - 112 mmol/L    Co2 27 20 - 33 mmol/L    Anion Gap 11.0 7.0 - 16.0    Glucose 117 (H) 65 - 99 mg/dL    Bun 36 (H) 8 - 22 mg/dL    Creatinine 1.02 0.50 - 1.40 mg/dL    Calcium 9.8 8.5 - 10.5 mg/dL    AST(SGOT) 24 12 - 45 U/L    ALT(SGPT) 7 2 - 50 U/L    Alkaline Phosphatase 105 (H) 30 - 99 U/L    Total Bilirubin 0.5 0.1 - 1.5 mg/dL    Albumin 4.8 3.2 - 4.9 g/dL    Total Protein 8.4 (H) 6.0 - 8.2 g/dL    Globulin 3.6 (H) 1.9 - 3.5 g/dL    A-G Ratio 1.3 g/dL   ESTIMATED GFR   Result Value Ref Range    GFR If African American >60 >60 mL/min/1.73 m 2    GFR If Non African American >60 >60 mL/min/1.73 m 2   Sed Rate   Result Value Ref Range    Sed [facility] 0 - 20 mm/hour   CRP QUANTITIVE (NON-CARDIAC)   Result Value Ref Range    Stat C-Reactive Protein <0.30 0.00 - 0.75  mg/dL   URINE MICROSCOPIC (W/UA)   Result Value Ref Range    WBC Rare (A) /hpf    RBC Rare /hpf    Bacteria Negative None /hpf    Epithelial Cells Negative /hpf   LACTIC ACID   Result Value Ref Range    Lactic Acid 1.4 0.5 - 2.0 mmol/L   BLOOD CULTURE    Specimen: Peripheral; Blood   Result Value Ref Range    Significant Indicator NEG     Source BLD     Site PERIPHERAL     Culture Result       No Growth  Note: Blood cultures are incubated for 5 days and  are monitored continuously.Positive blood cultures  are called to the RN and reported as soon as  they are identified.     BLOOD CULTURE    Specimen: Peripheral; Blood   Result Value Ref Range    Significant Indicator NEG     Source BLD     Site PERIPHERAL     Culture Result       No Growth  Note: Blood cultures are incubated for 5 days and  are monitored continuously.Positive blood cultures  are called to the RN and reported as soon as  they are identified.     URINE DRUG SCREEN   Result Value Ref Range    Amphetamines Urine Negative Negative    Barbiturates Negative Negative    Benzodiazepines Negative Negative    Cocaine Metabolite Negative Negative    Methadone Negative Negative    Opiates Negative Negative    Oxycodone Negative Negative    Phencyclidine -Pcp Negative Negative    Propoxyphene Negative Negative    Cannabinoid Metab Negative Negative   SARS-CoV-2 PCR (24 hour In-House): Collect NP swab in [facility]    Specimen: Respirate   Result Value Ref Range    SARS-CoV-2 Source NP Swab     SARS-CoV-2 by PCR NotDetected    CBC WITH DIFFERENTIAL   Result Value Ref Range    WBC 8.4 4.8 - 10.8 K/uL    RBC 5.15 4.70 - 6.10 M/uL    Hemoglobin 14.4 14.0 - 18.0 g/dL    Hematocrit 44.1 42.0 - 52.0 %    MCV 85.6 81.4 - 97.8 fL    MCH 28.0 27.0 - 33.0 pg    MCHC 32.7 (L) 33.7 - 35.3 g/dL    RDW 40.1 35.9 - 50.0 fL    Platelet Count 191 164 - 446 K/uL    MPV 9.6 9.0 - 12.9 fL    Neutrophils-Polys 64.00 44.00 - 72.00 %    Lymphocytes 24.10 22.00 - 41.00 %    Monocytes 7.40 0.00  - 13.40 %    Eosinophils 3.40 0.00 - 6.90 %    Basophils 0.70 0.00 - 1.80 %    Immature Granulocytes 0.40 0.00 - 0.90 %    Nucleated RBC 0.00 /100 WBC    Neutrophils (Absolute) 5.39 1.82 - 7.42 K/uL    Lymphs (Absolute) 2.03 1.00 - 4.80 K/uL    Monos (Absolute) 0.62 0.00 - 0.85 K/uL    Eos (Absolute) 0.29 0.00 - 0.51 K/uL    Baso (Absolute) 0.06 0.00 - 0.12 K/uL    Immature Granulocytes (abs) 0.03 0.00 - 0.11 K/uL    NRBC (Absolute) 0.00 K/uL   Comp Metabolic Panel   Result Value Ref Range    Sodium 140 135 - 145 mmol/L    Potassium 4.0 3.6 - 5.5 mmol/L    Chloride 105 96 - 112 mmol/L    Co2 25 20 - 33 mmol/L    Anion Gap 10.0 7.0 - 16.0    Glucose 111 (H) 65 - 99 mg/dL    Bun 21 8 - 22 mg/dL    Creatinine 0.91 0.50 - 1.40 mg/dL    Calcium 9.3 8.5 - 10.5 mg/dL    AST(SGOT) 20 12 - 45 U/L    ALT(SGPT) 29 2 - 50 U/L    Alkaline Phosphatase 92 30 - 99 U/L    Total Bilirubin 0.6 0.1 - 1.5 mg/dL    Albumin 4.2 3.2 - 4.9 g/dL    Total Protein 7.2 6.0 - 8.2 g/dL    Globulin 3.0 1.9 - 3.5 g/dL    A-G Ratio 1.4 g/dL   MAGNESIUM   Result Value Ref Range    Magnesium 2.5 1.5 - 2.5 mg/dL   ESTIMATED GFR   Result Value Ref Range    GFR If African American >60 >60 mL/min/1.73 m 2    GFR If Non African American >60 >60 mL/min/1.73 m 2   EKG (NOW)   Result Value Ref Range    Report       [facility] Emergency Dept[facility]    Test Date:  2021  Pt Name:    ALBERT VEGA             Department: ER  MRN:        [id number]                      Room:        05  Gender:     Male                         Technician: [id number]  :        1955                   Requested By:ER TRIAGE PROTOCOL  Order #:    [id number]                    Reading MD: BIJAN DIAZ MD    Measurements  Intervals                                Axis  Rate:       85                           P:          77  GA:         128                          QRS:        78  QRSD:       174                          T:          36  QT:         408  QTc:         486    Interpretive Statements  SINUS RHYTHM  NONSPECIFIC INTRAVENTRICULAR CONDUCTION DELAY  PROBABLE LEFT VENTRICULAR HYPERTROPHY  BASELINE WANDER IN LEAD(S) II,III,aVF,V4  No previous ECG available for comparison  Electronically Signed On 4- 8:49:42 PDT by BIJAN DIAZ MD     POCT glucose device results   Result Value Ref Range    Glucose - Accu-Ck 109 (H) 65 - 99 mg/dL   POCT glucose device results   Result Value Ref Range    Glucose - Accu-Ck 96 65 - 99 mg/dL   POCT glucose device results   Result Value Ref Range    Glucose - Accu-Ck 107 (H) 65 - 99 mg/dL   POCT glucose device results   Result Value Ref Range    Glucose - Accu-Ck 88 65 - 99 mg/dL   POCT glucose device results   Result Value Ref Range    Glucose - Accu-Ck 130 (H) 65 - 99 mg/dL   POCT glucose device results   Result Value Ref Range    Glucose - Accu-Ck 108 (H) 65 - 99 mg/dL   POCT glucose device results   Result Value Ref Range    Glucose - Accu-Ck 103 (H) 65 - 99 mg/dL       Imaging:   DX-HAND 3+ LEFT   Final Result      No evidence of acute fracture or dislocation.      Degenerative changes.      No evidence of erosive arthropathy.      DX-HAND 3+ RIGHT   Final Result      No evidence of acute fracture or dislocation.      No evidence of erosive arthropathy.      Degenerative changes.         US-EXTREMITY VENOUS LOWER BILAT   Final Result      DX-CHEST-PORTABLE (1 VIEW)   Final Result      No acute cardiopulmonary process is seen.          Problem Representation:   The patient is a 65-year-old male with a past medical history of diabetes (last A1c 6.8), Parkinson's (diagnosed at an outside hospital a few months ago on Sinemet), bilateral hand arthritis, and chronic lower extremity edema.  He presented to the hospital for hand pain, bilateral lower extremity edema, and failure to thrive.    * Failure to thrive in adult  Assessment & Plan  The patient is presenting with failure to thrive.  As per the family, the patient has some  difficulty at home.  They are concerned that he may be having some neurocognitive issues at this time.    Plan:  -PT recommended SNF, OT stated that the patient was in a lot of pain during the evaluation.  We will ask OT to reevaluate now that the patient's pain is improved.  -Neurocognitive eval recommends cognitive therapy at SNF. Working on finding a SNF that can accept him as well as a potential group home to take him after SNF stay.   -Social work is working with the family. His plan after he is discharged from SNF is to go back home with the brother until outpatient SW can place him in a group home.     Venous stasis dermatitis of both lower extremities  Assessment & Plan  The rash that the patient has is likely venous stasis dermatitis in his bilateral lower extremities.  Edema is likely related to venous insufficiency as well.  The patient had a negative BNP, negative DVT ultrasound, strong pulses on exam, negative ESR and CRP, and as per the patient this has been chronic and ongoing for years.  His edema had improved this morning as well after he had been laying down all night.  This makes it much less likely to be heart failure and more likely to be related to venous stasis.    Plan:  -Compression stockings ordered.  This is improving with elevation as well.      Parkinson disease ([facility])  Assessment & Plan  Per the patient, he was diagnosed with Parkinson's disease in [place] a few months back.  The patient is on Sinemet currently 4 times a day.    Plan:  -Continue Sinemet.  -The patient will need to be followed as an outpatient by his primary care provider for further work-up of this.    Osteoarthritis of both hands  Assessment & Plan  The patient has a history of chronic bilateral hand pain.  He worked as a  for a while.  His x-rays are consistent with osteoarthritis.  ESR and CRP were negative.    Plan:  -Added IcyHot for arthritic pain  -Continue scheduled Tylenol every 6 hours  -Added tramadol as  needed for pain  -Patient was also on meloxicam at home.  However, we will just use tramadol inpatient for now.    DM (diabetes mellitus) ([facility])  Assessment & Plan  The patient has a history of diabetes mellitus on Metformin.  Last A1c was this admission and it was 6.8.    Plan:  - Accuchecks have been fine. Will discontinue      DVT ppx: Lovenox  Diet: Diabetic  Tubes/Lines: PIV  Code status: FULL    Navya Mccormick M.D.

## 2021-04-29 NOTE — PROGRESS NOTES
Pt AOx4, medicated for neuropathic pain. Pt fatigued, resting quietly in bed, needs met. Tolerating RA, no s/s of dyspnea or respiratory distress. Call light within reach, personal belongings available, bed in lowest position, treaded socks on, and bed alarm on. Hourly rounding in place.

## 2021-04-29 NOTE — DISCHARGE PLANNING
Agency/Facility Name: [facility]  Outcome: This DPA received a voice message from Hermilo at [facility].  They need to know the discharge plan.  If a good discharge plan, [facility] would need to see if they are contracted with the patient's insurance and then run auth.  DPA will ask the LSW if patient wants to go to [facility].    LSW notified.    Agency/Facility Name: [facility]  Outcome: DPA called and left a message for Herm[facility] stating the patient's discharge plan per LSW note dated 4/28/21.  DPA stated if [facility] like's the discharge plan they could see if they take patient's insurance and run insurance auth.   DPA requested a call back.

## 2021-04-30 LAB
BACTERIA BLD CULT: NORMAL
BACTERIA BLD CULT: NORMAL
SIGNIFICANT IND 70042: NORMAL
SIGNIFICANT IND 70042: NORMAL
SITE SITE: NORMAL
SITE SITE: NORMAL
SOURCE SOURCE: NORMAL
SOURCE SOURCE: NORMAL

## 2021-04-30 PROCEDURE — A9270 NON-COVERED ITEM OR SERVICE: HCPCS | Performed by: INTERNAL MEDICINE

## 2021-04-30 PROCEDURE — A9270 NON-COVERED ITEM OR SERVICE: HCPCS | Performed by: STUDENT IN AN ORGANIZED HEALTH CARE EDUCATION/TRAINING PROGRAM

## 2021-04-30 PROCEDURE — 700102 HCHG RX REV CODE 250 W/ 637 OVERRIDE(OP): Performed by: INTERNAL MEDICINE

## 2021-04-30 PROCEDURE — 700102 HCHG RX REV CODE 250 W/ 637 OVERRIDE(OP): Performed by: STUDENT IN AN ORGANIZED HEALTH CARE EDUCATION/TRAINING PROGRAM

## 2021-04-30 PROCEDURE — 700111 HCHG RX REV CODE 636 W/ 250 OVERRIDE (IP): Performed by: STUDENT IN AN ORGANIZED HEALTH CARE EDUCATION/TRAINING PROGRAM

## 2021-04-30 PROCEDURE — 96372 THER/PROPH/DIAG INJ SC/IM: CPT

## 2021-04-30 PROCEDURE — G0378 HOSPITAL OBSERVATION PER HR: HCPCS

## 2021-04-30 PROCEDURE — 99224 PR SUBSEQUENT OBSERVATION CARE,LEVEL I: CPT | Mod: GC | Performed by: INTERNAL MEDICINE

## 2021-04-30 RX ORDER — IBUPROFEN 400 MG/1
400 TABLET ORAL EVERY 6 HOURS PRN
Status: DISCONTINUED | OUTPATIENT
Start: 2021-04-30 | End: 2021-05-09

## 2021-04-30 RX ORDER — TRAMADOL HYDROCHLORIDE 50 MG/1
50 TABLET ORAL EVERY 6 HOURS PRN
Status: DISCONTINUED | OUTPATIENT
Start: 2021-04-30 | End: 2021-05-09

## 2021-04-30 RX ORDER — MENTHOL AND METHYL SALICYLATE 10; 30 G/100G; G/100G
CREAM TOPICAL PRN
Status: DISCONTINUED | OUTPATIENT
Start: 2021-04-30 | End: 2021-05-14 | Stop reason: HOSPADM

## 2021-04-30 RX ADMIN — ASPIRIN 81 MG: 81 TABLET, COATED ORAL at 04:38

## 2021-04-30 RX ADMIN — ROSUVASTATIN CALCIUM 40 MG: 20 TABLET, FILM COATED ORAL at 04:38

## 2021-04-30 RX ADMIN — GABAPENTIN 300 MG: 300 CAPSULE ORAL at 17:24

## 2021-04-30 RX ADMIN — CARBIDOPA AND LEVODOPA 1 TABLET: 25; 250 TABLET ORAL at 17:24

## 2021-04-30 RX ADMIN — GABAPENTIN 300 MG: 300 CAPSULE ORAL at 04:38

## 2021-04-30 RX ADMIN — TAMSULOSIN HYDROCHLORIDE 0.4 MG: 0.4 CAPSULE ORAL at 04:38

## 2021-04-30 RX ADMIN — ACETAMINOPHEN 650 MG: 325 TABLET, FILM COATED ORAL at 11:14

## 2021-04-30 RX ADMIN — CARBIDOPA AND LEVODOPA 1 TABLET: 25; 250 TABLET ORAL at 04:38

## 2021-04-30 RX ADMIN — ACETAMINOPHEN 650 MG: 325 TABLET, FILM COATED ORAL at 04:37

## 2021-04-30 RX ADMIN — DOCUSATE SODIUM 50 MG AND SENNOSIDES 8.6 MG 2 TABLET: 8.6; 5 TABLET, FILM COATED ORAL at 04:38

## 2021-04-30 RX ADMIN — ACETAMINOPHEN 650 MG: 325 TABLET, FILM COATED ORAL at 17:23

## 2021-04-30 RX ADMIN — CARBIDOPA AND LEVODOPA 1 TABLET: 25; 250 TABLET ORAL at 11:14

## 2021-04-30 RX ADMIN — ACETAMINOPHEN 650 MG: 325 TABLET, FILM COATED ORAL at 22:05

## 2021-04-30 RX ADMIN — MENTHOL AND METHYL SALICYLATE: 10; 30 CREAM TOPICAL at 22:10

## 2021-04-30 RX ADMIN — TRAMADOL HYDROCHLORIDE 50 MG: 50 TABLET ORAL at 11:14

## 2021-04-30 RX ADMIN — DOCUSATE SODIUM 50 MG AND SENNOSIDES 8.6 MG 2 TABLET: 8.6; 5 TABLET, FILM COATED ORAL at 17:24

## 2021-04-30 RX ADMIN — ENOXAPARIN SODIUM 40 MG: 40 INJECTION SUBCUTANEOUS at 04:39

## 2021-04-30 RX ADMIN — CARBIDOPA AND LEVODOPA 1 TABLET: 25; 250 TABLET ORAL at 22:05

## 2021-04-30 RX ADMIN — TRAMADOL HYDROCHLORIDE 50 MG: 50 TABLET ORAL at 17:23

## 2021-04-30 ASSESSMENT — ENCOUNTER SYMPTOMS
HEADACHES: 0
FEVER: 0
INSOMNIA: 0
SHORTNESS OF BREATH: 0
CHILLS: 0
PALPITATIONS: 0
WEAKNESS: 0
FALLS: 0
VOMITING: 0
NAUSEA: 0
COUGH: 0
SORE THROAT: 0

## 2021-04-30 ASSESSMENT — PAIN DESCRIPTION - PAIN TYPE
TYPE: NEUROPATHIC PAIN

## 2021-04-30 ASSESSMENT — LIFESTYLE VARIABLES: SUBSTANCE_ABUSE: 0

## 2021-04-30 NOTE — PROGRESS NOTES
Pt A&Ox4 on RA, denies pain at this time. Pt pleasant and compliant. Dressing to PIV changed due to drainage. No signs of distress observed at this time, pt currently sleeping. Bed locked in lowest position, upper rails raised, call light within reach. Hourly rounding in place.

## 2021-04-30 NOTE — PROGRESS NOTES
Daily Progress Note:     Date of Service: 4/30/2021  Primary Team: [facility] Yellow Team   Attending: Opal Zapien M.D.   Senior Resident: Navya Mccormick M.D.  Contact:  [phone]    ID:   65-year-old male with a history of chronic leg swelling as well as bilateral hand pain who is presenting with bilateral lower extremity edema.     Interval Update:  The patient is still having some numbness in his hands and feet.  He is still having some pain at times as well.  Per social work, the patient will be returning back home to his brother's place after discharge from SNF until the patient can be placed in a group home.    Consultants/Specialty:  None    Review of Systems:    Review of Systems   Constitutional: Negative for chills and fever.   HENT: Negative for congestion and sore throat.    Respiratory: Negative for cough and shortness of breath.    Cardiovascular: Negative for chest pain and palpitations.   Gastrointestinal: Negative for nausea and vomiting.   Genitourinary: Negative for dysuria and urgency.   Musculoskeletal: Positive for joint pain. Negative for falls.   Skin: Negative for itching and rash.   Neurological: Negative for weakness and headaches.   Psychiatric/Behavioral: Negative for substance abuse. The patient does not have insomnia.        Objective Data:   Physical Exam:   Vitals:   Temp:  [36.1 °C (96.9 °F)-36.7 °C (98.1 °F)] 36.1 °C (96.9 °F)  Pulse:  [52-75] 56  Resp:  [16-18] 18  BP: ()/(54-76) 99/60  SpO2:  [94 %-99 %] 95 %     Physical Exam  Vitals and nursing note reviewed.   Constitutional:       General: He is not in acute distress.     Appearance: He is not toxic-appearing.   HENT:      Head: Normocephalic and atraumatic.   Eyes:      General: No scleral icterus.     Extraocular Movements: Extraocular movements intact.      Pupils: Pupils are equal, round, and reactive to light.   Cardiovascular:      Rate and Rhythm: Normal rate and regular rhythm.      Heart sounds: No murmur. No  gallop.    Pulmonary:      Effort: Pulmonary effort is normal. No respiratory distress.      Breath sounds: Normal breath sounds.   Abdominal:      General: Abdomen is flat. There is no distension.      Palpations: Abdomen is soft.      Tenderness: There is no abdominal tenderness.   Musculoskeletal:      Right lower leg: No edema.      Left lower leg: No edema.      Comments: Trigger finger of the left right finger.    Skin:     General: Skin is warm and dry.      Capillary Refill: Capillary refill takes less than 2 seconds.      Comments: No open wounds, redness, warmth in bilateral lower extremities.  There is discoloration consistent with changes of venous stasis dermatitis. Some skin wrinkling seen.   Neurological:      General: No focal deficit present.      Mental Status: He is alert.   Psychiatric:         Mood and Affect: Mood normal.         Behavior: Behavior normal.       Labs:   No recent lab work.    Imaging:   DX-HAND 3+ LEFT   Final Result      No evidence of acute fracture or dislocation.      Degenerative changes.      No evidence of erosive arthropathy.      DX-HAND 3+ RIGHT   Final Result      No evidence of acute fracture or dislocation.      No evidence of erosive arthropathy.      Degenerative changes.         US-EXTREMITY VENOUS LOWER BILAT   Final Result      DX-CHEST-PORTABLE (1 VIEW)   Final Result      No acute cardiopulmonary process is seen.          Problem Representation:   The patient is a 65-year-old male with a past medical history of diabetes (last A1c 6.8), Parkinson's (diagnosed at an outside hospital a few months ago on Sinemet), bilateral hand arthritis, and chronic lower extremity edema.  He presented to the hospital for hand pain, bilateral lower extremity edema, and failure to thrive.    * Failure to thrive in adult  Assessment & Plan  The patient is presenting with failure to thrive.  As per the family, the patient has some difficulty at home.  They are concerned that he  may be having some neurocognitive issues at this time.    Plan:  -PT recommended SNF, OT stated that the patient was in a lot of pain during the evaluation.  We will ask OT to reevaluate now that the patient's pain is improved.  -Neurocognitive eval recommends cognitive therapy at SNF. Working on finding a SNF that can accept him as well as a potential group home to take him after SNF stay.   -Social work is working with the family. His plan after he is discharged from SNF is to go back home with the brother until outpatient SW can place him in a group home.     Venous stasis dermatitis of both lower extremities  Assessment & Plan  The rash that the patient has is likely venous stasis dermatitis in his bilateral lower extremities.  Edema is likely related to venous insufficiency as well.  The patient had a negative BNP, negative DVT ultrasound, strong pulses on exam, negative ESR and CRP, and as per the patient this has been chronic and ongoing for years.  His edema had improved this morning as well after he had been laying down all night.  This makes it much less likely to be heart failure and more likely to be related to venous stasis.    Plan:  -Compression stockings ordered.  This is improving with elevation as well.      Parkinson disease (HCC)  Assessment & Plan  Per the patient, he was diagnosed with Parkinson's disease in [place] a few months back.  The patient is on Sinemet currently 4 times a day.    Plan:  -Continue Sinemet.  -The patient will need to be followed as an outpatient by his primary care provider for further work-up of this.    Osteoarthritis of both hands  Assessment & Plan  The patient has a history of chronic bilateral hand pain.  He worked as a  for a while.  His x-rays are consistent with osteoarthritis.  ESR and CRP were negative.    Plan:  -Added IcyHot for arthritic pain  -Continue scheduled Tylenol every 6 hours  -Added tramadol as needed for pain  -As needed ibuprofen for  pain  -Patient was also on meloxicam at home.  However, we will just use tramadol inpatient for now.    DM (diabetes mellitus) ([facility])  Assessment & Plan  The patient has a history of diabetes mellitus on Metformin.  Last A1c was this admission and it was 6.8.    Plan:  - Accuchecks have been fine. Will discontinue      DVT ppx: Lovenox  Diet: Diabetic  Tubes/Lines: PIV  Code status: FULL    Navya Mccormick M.D.

## 2021-04-30 NOTE — PROGRESS NOTES
Assessment/description of ears?: pink, blanching, intact  Which preventative measures are in place for the ears? Encouraged pt to remove glasses when resting, gray foam in place on glasses. Foam placed on bridge of nose.    Assessment/description of elbows?: right elbow red and blanching, left elbow pink, blanching  Which preventative measures are in place for the elbows?: mepilex applied to bilateral elbows for protection    Assessment/description of sacrum? Blanching, dry  Which preventative measures are in place for the sacrum?: barrier cream, pt up to bathroom (rarely moist)    Assessment/description of heels? Pink/red, dry, blanching  Which preventative measures are in place for the heels? mepilex applied to bilateral heels    Which devices are in place?: PIV  Description of skin under devices: intact  Which preventative measures are in place under devices? Dressing changed due to drainage    Other: none

## 2021-04-30 NOTE — DISCHARGE PLANNING
Agency/Facility Name: [facility]  Spoke To: Elizabeth   Outcome: Elizabeth stated that pt is still pending insurance auth. Elizabeth mentioned that Hermilo had further questions about d/c plan. This DPA relayed to elizabeth D/C plan with group home/brothers home per chart notes. Elizabeth stated that [facility] has 4 open beds today and would have Hermilo call this DPA with further updates

## 2021-04-30 NOTE — CARE PLAN
Problem: Safety  Goal: Will remain free from injury  Outcome: PROGRESSING AS EXPECTED  Intervention: Provide assistance with mobility  Note: Bed in lowest position. Non-skid socks in place. Personal possessions within reach. Mobility sign on door. Bed-alarm on. Call light within reach. Pt educated regarding fall prevention and states understanding.       Problem: Venous Thromboembolism (VTW)/Deep Vein Thrombosis (DVT) Prevention:  Goal: Patient will participate in Venous Thrombosis (VTE)/Deep Vein Thrombosis (DVT)Prevention Measures  Outcome: PROGRESSING AS EXPECTED  Intervention: Assess and monitor for anticoagulation complications  Note: Pt receiving Lovenox subcutaneous injection per MAR for DVT prevention.

## 2021-04-30 NOTE — CARE PLAN
Problem: Pain Management  Goal: Pain level will decrease to patient's comfort goal  Outcome: PROGRESSING AS EXPECTED  Note: Pt's pain will be well-controlled with prescribed pain regimen and non-pharmacological interventions. Pt given Tramadol for 8/10 pain in BL hands and feet. Pt reports relief from and comfort with movement following interventions.      Problem: Mobility  Goal: Risk for activity intolerance will decrease  Outcome: PROGRESSING AS EXPECTED  Note: Pt mobilized with RN, pt is able to ambulate with SBA and FWW.

## 2021-04-30 NOTE — PROGRESS NOTES
Late entry: 1000  Patient is AAO x 4. Family at bedside. He denies pain at this time. Obtained different pair of glasses form volunteers as he states his do not help him read close up. Discussed with him our plan in looking at snf and patient verbalized understanding. Discussed calling for assistance to the bathroom and patient verbalizes he will.     Assessment/description of ears? Intact. No area noted to be red.   Which preventative measures are in place for the ears? Patient does not wear O2. Discussed removing glasses to sleep.     Assessment/description of elbows? Intact, Some discoloration.   Which preventative measures are in place for the elbows? Patient turns self. He is up to bathroom. He sits up in bed for meals.     Assessment/description of sacrum? Some redness that blanches on left side.   Which preventative measures are in place for the sacrum? Esther paste in use. Per report previous RN Leatha asked wound to assess and they were not concerned and recommended continuous use of esther.     Assessment/description of heels? Pink/red blanching no area of concern   Which preventative measures are in place for the heels? Heels on pillow while in bed     Which devices are in place? PIV 2  Description of skin under devices: Slight drainage otherwise intact   Which preventative measures are in place under devices? Assessing under device and will evaluate removal     Other:

## 2021-04-30 NOTE — PROGRESS NOTES
Pt is A&Ox4, VSS on RA.  Pt is able to ambulate with FWW and SBA.  Pt reports 8/10 pain, medicated per MAR.

## 2021-04-30 NOTE — CARE PLAN
Problem: Safety  Goal: Will remain free from falls  Outcome: PROGRESSING AS EXPECTED  Intervention: Assess risk factors for falls  Flowsheets (Taken 4/29/2021 1850)  Pt Calls for Assistance: No  Note: Goal for patient to demonstrate use of call light prior to standing. Patient requires a snf for PT needs. Assisting patient to the bathroom and ensuring that precautions are in place if left sitting up. Bed alarm in use, treaded socks in use, call light in reach. Patient demonstrates ability to use the call light and calls throughout shift      Problem: Bowel/Gastric:  Goal: Normal bowel function is maintained or improved  Outcome: PROGRESSING AS EXPECTED  Intervention: Educate patient and significant other/support system about diet, fluid intake, medications and activity to promote bowel function  Note: Patient has stool softeners available have assessed his BM as his mobility is limited past his normal. Patient states that the stool softeners are assisting in maintaining normal bowel patter

## 2021-04-30 NOTE — PROGRESS NOTES
Assessment/description of ears?: pink, blanching, intact  Which preventative measures are in place for the ears? Encouraged pt to remove glasses when sleeping    Assessment/description of elbows?: right elbow red and blanching, left elbow pink, blanching  Which preventative measures are in place for the elbows?: mepilex applied to bilateral elbows for protection    Assessment/description of sacrum? Blanching, dry  Which preventative measures are in place for the sacrum?: barrier cream, pt up to bathroom (rarely moist)    Assessment/description of heels? Pink/red, dry, blanching  Which preventative measures are in place for the heels? mepilex applied to bilateral heels    Which devices are in place?: PIV  Description of skin under devices: intact  Which preventative measures are in place under devices? Dressing changed due to drainage    Other: none

## 2021-05-01 PROCEDURE — 700102 HCHG RX REV CODE 250 W/ 637 OVERRIDE(OP): Performed by: STUDENT IN AN ORGANIZED HEALTH CARE EDUCATION/TRAINING PROGRAM

## 2021-05-01 PROCEDURE — 99224 PR SUBSEQUENT OBSERVATION CARE,LEVEL I: CPT | Mod: GC | Performed by: INTERNAL MEDICINE

## 2021-05-01 PROCEDURE — G0378 HOSPITAL OBSERVATION PER HR: HCPCS

## 2021-05-01 PROCEDURE — 700102 HCHG RX REV CODE 250 W/ 637 OVERRIDE(OP): Performed by: INTERNAL MEDICINE

## 2021-05-01 PROCEDURE — 96372 THER/PROPH/DIAG INJ SC/IM: CPT

## 2021-05-01 PROCEDURE — A9270 NON-COVERED ITEM OR SERVICE: HCPCS | Performed by: STUDENT IN AN ORGANIZED HEALTH CARE EDUCATION/TRAINING PROGRAM

## 2021-05-01 PROCEDURE — A9270 NON-COVERED ITEM OR SERVICE: HCPCS | Performed by: INTERNAL MEDICINE

## 2021-05-01 PROCEDURE — 700111 HCHG RX REV CODE 636 W/ 250 OVERRIDE (IP): Performed by: STUDENT IN AN ORGANIZED HEALTH CARE EDUCATION/TRAINING PROGRAM

## 2021-05-01 RX ADMIN — CARBIDOPA AND LEVODOPA 1 TABLET: 25; 250 TABLET ORAL at 12:01

## 2021-05-01 RX ADMIN — CARBIDOPA AND LEVODOPA 1 TABLET: 25; 250 TABLET ORAL at 04:15

## 2021-05-01 RX ADMIN — ENOXAPARIN SODIUM 40 MG: 40 INJECTION SUBCUTANEOUS at 04:15

## 2021-05-01 RX ADMIN — TRAMADOL HYDROCHLORIDE 50 MG: 50 TABLET ORAL at 12:01

## 2021-05-01 RX ADMIN — ROSUVASTATIN CALCIUM 40 MG: 20 TABLET, FILM COATED ORAL at 04:15

## 2021-05-01 RX ADMIN — DOCUSATE SODIUM 50 MG AND SENNOSIDES 8.6 MG 2 TABLET: 8.6; 5 TABLET, FILM COATED ORAL at 04:15

## 2021-05-01 RX ADMIN — GABAPENTIN 300 MG: 300 CAPSULE ORAL at 16:52

## 2021-05-01 RX ADMIN — TRAMADOL HYDROCHLORIDE 50 MG: 50 TABLET ORAL at 19:44

## 2021-05-01 RX ADMIN — CARBIDOPA AND LEVODOPA 1 TABLET: 25; 250 TABLET ORAL at 22:18

## 2021-05-01 RX ADMIN — GABAPENTIN 300 MG: 300 CAPSULE ORAL at 04:15

## 2021-05-01 RX ADMIN — ASPIRIN 81 MG: 81 TABLET, COATED ORAL at 04:15

## 2021-05-01 RX ADMIN — ACETAMINOPHEN 650 MG: 325 TABLET, FILM COATED ORAL at 12:01

## 2021-05-01 RX ADMIN — CARBIDOPA AND LEVODOPA 1 TABLET: 25; 250 TABLET ORAL at 16:52

## 2021-05-01 RX ADMIN — ACETAMINOPHEN 650 MG: 325 TABLET, FILM COATED ORAL at 16:52

## 2021-05-01 RX ADMIN — TAMSULOSIN HYDROCHLORIDE 0.4 MG: 0.4 CAPSULE ORAL at 04:15

## 2021-05-01 RX ADMIN — ACETAMINOPHEN 650 MG: 325 TABLET, FILM COATED ORAL at 04:15

## 2021-05-01 RX ADMIN — ACETAMINOPHEN 650 MG: 325 TABLET, FILM COATED ORAL at 22:18

## 2021-05-01 ASSESSMENT — ENCOUNTER SYMPTOMS
COUGH: 0
PALPITATIONS: 0
CHILLS: 0
SORE THROAT: 0
FEVER: 0
FALLS: 0
SHORTNESS OF BREATH: 0
NAUSEA: 0
INSOMNIA: 0
VOMITING: 0
HEADACHES: 0
WEAKNESS: 0

## 2021-05-01 ASSESSMENT — PAIN DESCRIPTION - PAIN TYPE
TYPE: ACUTE PAIN
TYPE: ACUTE PAIN
TYPE: NEUROPATHIC PAIN

## 2021-05-01 ASSESSMENT — LIFESTYLE VARIABLES: SUBSTANCE_ABUSE: 0

## 2021-05-01 ASSESSMENT — FIBROSIS 4 INDEX: FIB4 SCORE: 1.26

## 2021-05-01 NOTE — PROGRESS NOTES
Pt A&Ox4 on RA, reported pain to BUE, managed with PRN and scheduled medication per MAR. Pt pleasant and compliant. No signs of distress observed at this time, pt currently sleeping. Bed locked in lowest position, upper rails raised, bed alarm on, call light within reach. Hourly rounding in place.        SKIN ASSESSMENT  Assessment/description of ears?: pink, blanching, intact  Which preventative measures are in place for the ears? Encouraged pt to remove glasses when sleeping     Assessment/description of elbows?: elbows pink, blanching  Which preventative measures are in place for the elbows?: mepilex on bilateral elbows for protection     Assessment/description of sacrum? Blanching, dry  Which preventative measures are in place for the sacrum?: trupti cream, pt up to bathroom (rarely moist)     Assessment/description of heels? Pink/red, dry, blanching  Which preventative measures are in place for the heels? mepilex on bilateral heels     Which devices are in place?: PIV  Description of skin under devices: intact  Which preventative measures are in place under devices? n/a     Other: none

## 2021-05-01 NOTE — PROGRESS NOTES
Pt is A&Ox4, VSS on RA.  Pt is able to ambulate with FWW and SBA.  Pt reports 9/10 pain, medicated per MAR.

## 2021-05-01 NOTE — CARE PLAN
Problem: Safety  Goal: Will remain free from injury  Outcome: PROGRESSING AS EXPECTED  Note: Bed in lowest position. Personal possessions within reach. Mobility sign on door. Bed-alarm on. Call light within reach. Pt educated regarding fall prevention and states understanding.       Problem: Pain Management  Goal: Pain level will decrease to patient's comfort goal  Outcome: PROGRESSING AS EXPECTED  Note: Pt reported pain to bilateral hands, managed with PRN icy hot and scheduled Tylenol.

## 2021-05-01 NOTE — CARE PLAN
Problem: Skin Integrity  Goal: Risk for impaired skin integrity will decrease  Description: Intervention: full skin check, mepilex to elbows and heels, barrier cream to sacrum  Outcome: no new pressure injuries noted at this time   Outcome: PROGRESSING AS EXPECTED  Note: Pt has MAD on sacrum/buttocks, trupti cream applied. Area being cleaned as needed. Linen changes and incontinence checks being performed frequently. Waffle overlay placed.      Problem: Mobility  Goal: Risk for activity intolerance will decrease  Outcome: PROGRESSING AS EXPECTED  Note: Pt being encouraged to ambulate with staff with FWW. Pt is able to ambulate 75 ft with shuffling gait.

## 2021-05-02 PROCEDURE — 99224 PR SUBSEQUENT OBSERVATION CARE,LEVEL I: CPT | Mod: GC | Performed by: INTERNAL MEDICINE

## 2021-05-02 PROCEDURE — A9270 NON-COVERED ITEM OR SERVICE: HCPCS | Performed by: INTERNAL MEDICINE

## 2021-05-02 PROCEDURE — 96372 THER/PROPH/DIAG INJ SC/IM: CPT

## 2021-05-02 PROCEDURE — 700102 HCHG RX REV CODE 250 W/ 637 OVERRIDE(OP): Performed by: INTERNAL MEDICINE

## 2021-05-02 PROCEDURE — 700111 HCHG RX REV CODE 636 W/ 250 OVERRIDE (IP): Performed by: STUDENT IN AN ORGANIZED HEALTH CARE EDUCATION/TRAINING PROGRAM

## 2021-05-02 PROCEDURE — A9270 NON-COVERED ITEM OR SERVICE: HCPCS | Performed by: STUDENT IN AN ORGANIZED HEALTH CARE EDUCATION/TRAINING PROGRAM

## 2021-05-02 PROCEDURE — 700102 HCHG RX REV CODE 250 W/ 637 OVERRIDE(OP): Performed by: STUDENT IN AN ORGANIZED HEALTH CARE EDUCATION/TRAINING PROGRAM

## 2021-05-02 PROCEDURE — G0378 HOSPITAL OBSERVATION PER HR: HCPCS

## 2021-05-02 RX ADMIN — ACETAMINOPHEN 650 MG: 325 TABLET, FILM COATED ORAL at 23:51

## 2021-05-02 RX ADMIN — DOCUSATE SODIUM 50 MG AND SENNOSIDES 8.6 MG 2 TABLET: 8.6; 5 TABLET, FILM COATED ORAL at 17:29

## 2021-05-02 RX ADMIN — ACETAMINOPHEN 650 MG: 325 TABLET, FILM COATED ORAL at 17:30

## 2021-05-02 RX ADMIN — TRAMADOL HYDROCHLORIDE 50 MG: 50 TABLET ORAL at 08:49

## 2021-05-02 RX ADMIN — ASPIRIN 81 MG: 81 TABLET, COATED ORAL at 04:11

## 2021-05-02 RX ADMIN — CARBIDOPA AND LEVODOPA 1 TABLET: 25; 250 TABLET ORAL at 04:11

## 2021-05-02 RX ADMIN — GABAPENTIN 300 MG: 300 CAPSULE ORAL at 04:12

## 2021-05-02 RX ADMIN — CARBIDOPA AND LEVODOPA 1 TABLET: 25; 250 TABLET ORAL at 11:17

## 2021-05-02 RX ADMIN — TRAMADOL HYDROCHLORIDE 50 MG: 50 TABLET ORAL at 18:35

## 2021-05-02 RX ADMIN — TAMSULOSIN HYDROCHLORIDE 0.4 MG: 0.4 CAPSULE ORAL at 04:11

## 2021-05-02 RX ADMIN — ROSUVASTATIN CALCIUM 40 MG: 20 TABLET, FILM COATED ORAL at 04:11

## 2021-05-02 RX ADMIN — ACETAMINOPHEN 650 MG: 325 TABLET, FILM COATED ORAL at 11:17

## 2021-05-02 RX ADMIN — CARBIDOPA AND LEVODOPA 1 TABLET: 25; 250 TABLET ORAL at 17:30

## 2021-05-02 RX ADMIN — DOCUSATE SODIUM 50 MG AND SENNOSIDES 8.6 MG 2 TABLET: 8.6; 5 TABLET, FILM COATED ORAL at 04:11

## 2021-05-02 RX ADMIN — ACETAMINOPHEN 650 MG: 325 TABLET, FILM COATED ORAL at 04:12

## 2021-05-02 RX ADMIN — CARBIDOPA AND LEVODOPA 1 TABLET: 25; 250 TABLET ORAL at 21:12

## 2021-05-02 RX ADMIN — ENOXAPARIN SODIUM 40 MG: 40 INJECTION SUBCUTANEOUS at 04:12

## 2021-05-02 RX ADMIN — GABAPENTIN 300 MG: 300 CAPSULE ORAL at 17:30

## 2021-05-02 ASSESSMENT — ENCOUNTER SYMPTOMS
SHORTNESS OF BREATH: 0
INSOMNIA: 0
CHILLS: 0
PALPITATIONS: 0
NAUSEA: 0
VOMITING: 0
COUGH: 0
HEADACHES: 0
FALLS: 0
SORE THROAT: 0
FEVER: 0
WEAKNESS: 0

## 2021-05-02 ASSESSMENT — PAIN DESCRIPTION - PAIN TYPE
TYPE: ACUTE PAIN
TYPE: ACUTE PAIN
TYPE: NEUROPATHIC PAIN
TYPE: NEUROPATHIC PAIN
TYPE: ACUTE PAIN

## 2021-05-02 ASSESSMENT — LIFESTYLE VARIABLES: SUBSTANCE_ABUSE: 0

## 2021-05-02 NOTE — PROGRESS NOTES
Daily Progress Note:     Date of Service: 5/2/2021  Primary Team: [facility] Yellow Team   Attending: Opal Zapien M.D.   Senior Resident: Justin Warren M.D.  Contact:  [phone]    ID:   65-year-old male with a history of chronic leg swelling as well as bilateral hand pain who is presenting with bilateral lower extremity edema.     Interval Update:  Patient is doing well this morning.  Continues having hand pain, overall improving but still has numbness.  We are still awaiting placement.    Consultants/Specialty:  None    Review of Systems:    Review of Systems   Constitutional: Negative for chills and fever.   HENT: Negative for congestion and sore throat.    Respiratory: Negative for cough and shortness of breath.    Cardiovascular: Negative for chest pain and palpitations.   Gastrointestinal: Negative for nausea and vomiting.   Genitourinary: Negative for dysuria and urgency.   Musculoskeletal: Positive for joint pain. Negative for falls.   Skin: Negative for itching and rash.   Neurological: Negative for weakness and headaches.   Psychiatric/Behavioral: Negative for substance abuse. The patient does not have insomnia.        Objective Data:   Physical Exam:   Vitals:   Temp:  [36.2 °C (97.2 °F)-36.7 °C (98 °F)] 36.7 °C (98 °F)  Pulse:  [50-65] 50  Resp:  [16-17] 16  BP: (107-128)/(50-68) 107/50  SpO2:  [92 %-97 %] 92 %     Physical Exam  Vitals and nursing note reviewed.   Constitutional:       General: He is not in acute distress.     Appearance: He is not toxic-appearing.   HENT:      Head: Normocephalic and atraumatic.   Eyes:      General: No scleral icterus.     Extraocular Movements: Extraocular movements intact.      Pupils: Pupils are equal, round, and reactive to light.   Cardiovascular:      Rate and Rhythm: Normal rate and regular rhythm.      Heart sounds: No murmur. No gallop.    Pulmonary:      Effort: Pulmonary effort is normal. No respiratory distress.      Breath sounds: Normal breath  sounds.   Abdominal:      General: Abdomen is flat. There is no distension.      Palpations: Abdomen is soft.      Tenderness: There is no abdominal tenderness.   Musculoskeletal:      Right lower leg: No edema.      Left lower leg: No edema.      Comments: Trigger finger of the left right finger.    Skin:     General: Skin is warm and dry.      Capillary Refill: Capillary refill takes less than 2 seconds.      Comments: No open wounds, redness, warmth in bilateral lower extremities.  There is discoloration consistent with changes of venous stasis dermatitis. Some skin wrinkling seen.   Neurological:      General: No focal deficit present.      Mental Status: He is alert.   Psychiatric:         Mood and Affect: Mood normal.         Behavior: Behavior normal.       Labs:   No recent lab work.    Imaging:   DX-HAND 3+ LEFT   Final Result      No evidence of acute fracture or dislocation.      Degenerative changes.      No evidence of erosive arthropathy.      DX-HAND 3+ RIGHT   Final Result      No evidence of acute fracture or dislocation.      No evidence of erosive arthropathy.      Degenerative changes.         US-EXTREMITY VENOUS LOWER BILAT   Final Result      DX-CHEST-PORTABLE (1 VIEW)   Final Result      No acute cardiopulmonary process is seen.          Problem Representation:   The patient is a 65-year-old male with a past medical history of diabetes (last A1c 6.8), Parkinson's (diagnosed at an outside hospital a few months ago on Sinemet), bilateral hand arthritis, and chronic lower extremity edema.  He presented to the hospital for hand pain, bilateral lower extremity edema, and failure to thrive.    * Failure to thrive in adult  Assessment & Plan  The patient is presenting with failure to thrive.  As per the family, the patient has some difficulty at home.  They are concerned that he may be having some neurocognitive issues at this time.    Plan:  -PT recommended SNF, OT stated that the patient was in a  lot of pain during the evaluation.  We will ask OT to reevaluate now that the patient's pain is improved.  -Neurocognitive eval recommends cognitive therapy at SNF. Working on finding a SNF that can accept him as well as a potential group home to take him after SNF stay.   -Social work is working with the family. His plan after he is discharged from SNF is to go back home with the brother until outpatient SW can place him in a group home.     Venous stasis dermatitis of both lower extremities  Assessment & Plan  The rash that the patient has is likely venous stasis dermatitis in his bilateral lower extremities.  Edema is likely related to venous insufficiency as well.  The patient had a negative BNP, negative DVT ultrasound, strong pulses on exam, negative ESR and CRP, and as per the patient this has been chronic and ongoing for years.  His edema had improved this morning as well after he had been laying down all night.  This makes it much less likely to be heart failure and more likely to be related to venous stasis.    Plan:  -Compression stockings ordered.  This is improving with elevation as well.      Parkinson disease ([facility])  Assessment & Plan  Per the patient, he was diagnosed with Parkinson's disease in [place] a few months back.  The patient is on Sinemet currently 4 times a day.    Plan:  -Continue Sinemet.  -The patient will need to be followed as an outpatient by his primary care provider for further work-up of this.    Osteoarthritis of both hands  Assessment & Plan  The patient has a history of chronic bilateral hand pain.  He worked as a  for a while.  His x-rays are consistent with osteoarthritis.  ESR and CRP were negative.    Plan:  -Added IcyHot for arthritic pain  -Continue scheduled Tylenol every 6 hours  -Added tramadol as needed for pain  -As needed ibuprofen for pain  -Patient was also on meloxicam at home.  However, we will just use tramadol inpatient for now.    DM (diabetes mellitus)  (HCC)  Assessment & Plan  The patient has a history of diabetes mellitus on Metformin.  Last A1c was this admission and it was 6.8.    Plan:  - Accuchecks have been fine. Will discontinue      DVT ppx: Lovenox  Diet: Diabetic  Tubes/Lines: PIV  Code status: FULL    Justin Warren M.D.

## 2021-05-02 NOTE — PROGRESS NOTES
Pt A&Ox4 on RA, reported 10/10 pain to BUE, managed with PRN tramadol per MAR. Pt pleasant and compliant. No signs of distress observed at this time, pt currently sleeping. Bed locked in lowest position, upper rails raised, bed alarm on, call light within reach. Hourly rounding in place.           SKIN ASSESSMENT  Assessment/description of ears?: pink, blanching, intact  Which preventative measures are in place for the ears? Encouraged pt to remove glasses when sleeping     Assessment/description of elbows?: Right elbow red, blalnching, small dry scab. Left elbow pink, blanching, intact  Which preventative measures are in place for the elbows?: mepilex on bilateral elbows for protection     Assessment/description of sacrum? blanching, dry  Which preventative measures are in place for the sacrum?: trupti cream, pt up to bathroom and uses urinal (rarely moist)     Assessment/description of heels? Pink/red, dry, blanching  Which preventative measures are in place for the heels? mepilex applied to bilateral heels     Which devices are in place?: PIV  Description of skin under devices: intact  Which preventative measures are in place under devices? dressing changed     Other: none

## 2021-05-02 NOTE — CARE PLAN
Problem: Safety  Goal: Will remain free from injury  Outcome: PROGRESSING AS EXPECTED  Note: Bed in lowest position. Personal possessions within reach. Mobility sign on door. Bed-alarm on. Call light within reach. Pt educated regarding fall prevention and states understanding.       Problem: Pain Management  Goal: Pain level will decrease to patient's comfort goal  Outcome: PROGRESSING AS EXPECTED  Note: Pt reported 10/10 pain to BUE, managed with PRN tramadol per MAR.

## 2021-05-02 NOTE — PROGRESS NOTES
Received bedside report from night shift RN.   Assumed care of patient at change of shift.   Assessment complete and POC discussed.   Patient is A&Ox4  Pulse was 50 this morning, patient runs nicki at times.  Patient reports 7/10 pain in bilateral hands and bilateral legs - medicated with Tramadol per MAR.  Patient is resting in bed.  Bed is in lowest/locked position.   Call light and belongings are within reach.   No further needs at this time.

## 2021-05-02 NOTE — PROGRESS NOTES
Daily Progress Note:     Date of Service: 5/1/2021  Primary Team: [facility] Yellow Team   Attending: Opal Zapien M.D.   Senior Resident: Navya Mccormick M.D.  Contact:  [phone]    ID:   65-year-old male with a history of chronic leg swelling as well as bilateral hand pain who is presenting with bilateral lower extremity edema.     Interval Update:  Patient is doing well this morning.  His pain is improving but still has numbness.  We are still awaiting placement.    Consultants/Specialty:  None    Review of Systems:    Review of Systems   Constitutional: Negative for chills and fever.   HENT: Negative for congestion and sore throat.    Respiratory: Negative for cough and shortness of breath.    Cardiovascular: Negative for chest pain and palpitations.   Gastrointestinal: Negative for nausea and vomiting.   Genitourinary: Negative for dysuria and urgency.   Musculoskeletal: Positive for joint pain. Negative for falls.   Skin: Negative for itching and rash.   Neurological: Negative for weakness and headaches.   Psychiatric/Behavioral: Negative for substance abuse. The patient does not have insomnia.        Objective Data:   Physical Exam:   Vitals:   Temp:  [36.1 °C (97 °F)-36.4 °C (97.5 °F)] 36.4 °C (97.5 °F)  Pulse:  [52-65] 65  Resp:  [16-17] 16  BP: (114-128)/(52-70) 128/68  SpO2:  [95 %-97 %] 96 %     Physical Exam  Vitals and nursing note reviewed.   Constitutional:       General: He is not in acute distress.     Appearance: He is not toxic-appearing.   HENT:      Head: Normocephalic and atraumatic.   Eyes:      General: No scleral icterus.     Extraocular Movements: Extraocular movements intact.      Pupils: Pupils are equal, round, and reactive to light.   Cardiovascular:      Rate and Rhythm: Normal rate and regular rhythm.      Heart sounds: No murmur. No gallop.    Pulmonary:      Effort: Pulmonary effort is normal. No respiratory distress.      Breath sounds: Normal breath sounds.   Abdominal:       General: Abdomen is flat. There is no distension.      Palpations: Abdomen is soft.      Tenderness: There is no abdominal tenderness.   Musculoskeletal:      Right lower leg: No edema.      Left lower leg: No edema.      Comments: Trigger finger of the left right finger.    Skin:     General: Skin is warm and dry.      Capillary Refill: Capillary refill takes less than 2 seconds.      Comments: No open wounds, redness, warmth in bilateral lower extremities.  There is discoloration consistent with changes of venous stasis dermatitis. Some skin wrinkling seen.   Neurological:      General: No focal deficit present.      Mental Status: He is alert.   Psychiatric:         Mood and Affect: Mood normal.         Behavior: Behavior normal.       Labs:   No recent lab work.    Imaging:   DX-HAND 3+ LEFT   Final Result      No evidence of acute fracture or dislocation.      Degenerative changes.      No evidence of erosive arthropathy.      DX-HAND 3+ RIGHT   Final Result      No evidence of acute fracture or dislocation.      No evidence of erosive arthropathy.      Degenerative changes.         US-EXTREMITY VENOUS LOWER BILAT   Final Result      DX-CHEST-PORTABLE (1 VIEW)   Final Result      No acute cardiopulmonary process is seen.          Problem Representation:   The patient is a 65-year-old male with a past medical history of diabetes (last A1c 6.8), Parkinson's (diagnosed at an outside hospital a few months ago on Sinemet), bilateral hand arthritis, and chronic lower extremity edema.  He presented to the hospital for hand pain, bilateral lower extremity edema, and failure to thrive.    * Failure to thrive in adult  Assessment & Plan  The patient is presenting with failure to thrive.  As per the family, the patient has some difficulty at home.  They are concerned that he may be having some neurocognitive issues at this time.    Plan:  -PT recommended SNF, OT stated that the patient was in a lot of pain during the  evaluation.  We will ask OT to reevaluate now that the patient's pain is improved.  -Neurocognitive eval recommends cognitive therapy at SNF. Working on finding a SNF that can accept him as well as a potential group home to take him after SNF stay.   -Social work is working with the family. His plan after he is discharged from SNF is to go back home with the brother until outpatient SW can place him in a group home.     Venous stasis dermatitis of both lower extremities  Assessment & Plan  The rash that the patient has is likely venous stasis dermatitis in his bilateral lower extremities.  Edema is likely related to venous insufficiency as well.  The patient had a negative BNP, negative DVT ultrasound, strong pulses on exam, negative ESR and CRP, and as per the patient this has been chronic and ongoing for years.  His edema had improved this morning as well after he had been laying down all night.  This makes it much less likely to be heart failure and more likely to be related to venous stasis.    Plan:  -Compression stockings ordered.  This is improving with elevation as well.      Parkinson disease ([facility])  Assessment & Plan  Per the patient, he was diagnosed with Parkinson's disease in [place] a few months back.  The patient is on Sinemet currently 4 times a day.    Plan:  -Continue Sinemet.  -The patient will need to be followed as an outpatient by his primary care provider for further work-up of this.    Osteoarthritis of both hands  Assessment & Plan  The patient has a history of chronic bilateral hand pain.  He worked as a  for a while.  His x-rays are consistent with osteoarthritis.  ESR and CRP were negative.    Plan:  -Added IcyHot for arthritic pain  -Continue scheduled Tylenol every 6 hours  -Added tramadol as needed for pain  -As needed ibuprofen for pain  -Patient was also on meloxicam at home.  However, we will just use tramadol inpatient for now.    DM (diabetes mellitus) ([facility])  Assessment &  Plan  The patient has a history of diabetes mellitus on Metformin.  Last A1c was this admission and it was 6.8.    Plan:  - Accuchecks have been fine. Will discontinue      DVT ppx: Lovenox  Diet: Diabetic  Tubes/Lines: PIV  Code status: FULL    Navya Mccormick M.D.

## 2021-05-02 NOTE — CARE PLAN
Problem: Safety  Goal: Will remain free from falls  Outcome: PROGRESSING AS EXPECTED  Note: HD = 15; HIGH fall risk. Patient educated to call for assistance. Bed alarm is on. Bed is in lowest/locked position. Call light and belongings are within reach.     Problem: Knowledge Deficit  Goal: Knowledge of disease process/condition, treatment plan, diagnostic tests, and medications will improve  Outcome: PROGRESSING AS EXPECTED  Note: Patient educated regarding plan of care and medications. All questions answered.       Problem: Mobility  Goal: Risk for activity intolerance will decrease  Outcome: PROGRESSING AS EXPECTED  Note: Patient is a 1 assist with a FWW. Patient encouraged to ambulate to decrease activity intolerance.

## 2021-05-02 NOTE — PROGRESS NOTES
Assessment/description of ears? Intact, pink, blanching  Which preventative measures are in place for the ears? Patient encouraged to remove glasses when not needing or when sleeping    Assessment/description of elbows?   Bilateral elbows: red, slow to jaya - mepliex in place  Small scab located on right elbow    Which preventative measures are in place for the elbows?    Assessment/description of sacrum? Intact, blanching  Which preventative measures are in place for the sacrum? Barrier paste applied    Assessment/description of heels? Boggy, pink, blanching  Which preventative measures are in place for the heels? Bilateral prophylactic mepliex    Which devices are in place? PIV  Description of skin under devices: Intact  Which preventative measures are in place under devices? PRN dressing changes    Other: BLE dusky, red, trace edematous

## 2021-05-03 LAB
ALBUMIN SERPL BCP-MCNC: 4 G/DL (ref 3.2–4.9)
ALBUMIN/GLOB SERPL: 1.3 G/DL
ALP SERPL-CCNC: 92 U/L (ref 30–99)
ALT SERPL-CCNC: 26 U/L (ref 2–50)
ANION GAP SERPL CALC-SCNC: 8 MMOL/L (ref 7–16)
AST SERPL-CCNC: 87 U/L (ref 12–45)
BASOPHILS # BLD AUTO: 1.4 % (ref 0–1.8)
BASOPHILS # BLD: 0.1 K/UL (ref 0–0.12)
BILIRUB SERPL-MCNC: 0.2 MG/DL (ref 0.1–1.5)
BUN SERPL-MCNC: 19 MG/DL (ref 8–22)
CALCIUM SERPL-MCNC: 8.9 MG/DL (ref 8.5–10.5)
CHLORIDE SERPL-SCNC: 100 MMOL/L (ref 96–112)
CO2 SERPL-SCNC: 29 MMOL/L (ref 20–33)
CREAT SERPL-MCNC: 0.95 MG/DL (ref 0.5–1.4)
EOSINOPHIL # BLD AUTO: 0.45 K/UL (ref 0–0.51)
EOSINOPHIL NFR BLD: 6.3 % (ref 0–6.9)
ERYTHROCYTE [DISTWIDTH] IN BLOOD BY AUTOMATED COUNT: 41.4 FL (ref 35.9–50)
GLOBULIN SER CALC-MCNC: 3.1 G/DL (ref 1.9–3.5)
GLUCOSE SERPL-MCNC: 133 MG/DL (ref 65–99)
HCT VFR BLD AUTO: 44.9 % (ref 42–52)
HGB BLD-MCNC: 14.8 G/DL (ref 14–18)
IMM GRANULOCYTES # BLD AUTO: 0.02 K/UL (ref 0–0.11)
IMM GRANULOCYTES NFR BLD AUTO: 0.3 % (ref 0–0.9)
LYMPHOCYTES # BLD AUTO: 2.48 K/UL (ref 1–4.8)
LYMPHOCYTES NFR BLD: 34.6 % (ref 22–41)
MAGNESIUM SERPL-MCNC: 2.1 MG/DL (ref 1.5–2.5)
MCH RBC QN AUTO: 28.5 PG (ref 27–33)
MCHC RBC AUTO-ENTMCNC: 33 G/DL (ref 33.7–35.3)
MCV RBC AUTO: 86.3 FL (ref 81.4–97.8)
MONOCYTES # BLD AUTO: 0.52 K/UL (ref 0–0.85)
MONOCYTES NFR BLD AUTO: 7.3 % (ref 0–13.4)
NEUTROPHILS # BLD AUTO: 3.6 K/UL (ref 1.82–7.42)
NEUTROPHILS NFR BLD: 50.1 % (ref 44–72)
NRBC # BLD AUTO: 0 K/UL
NRBC BLD-RTO: 0 /100 WBC
PLATELET # BLD AUTO: 167 K/UL (ref 164–446)
PMV BLD AUTO: 10 FL (ref 9–12.9)
POTASSIUM SERPL-SCNC: 4.1 MMOL/L (ref 3.6–5.5)
PROT SERPL-MCNC: 7.1 G/DL (ref 6–8.2)
RBC # BLD AUTO: 5.2 M/UL (ref 4.7–6.1)
SODIUM SERPL-SCNC: 137 MMOL/L (ref 135–145)
WBC # BLD AUTO: 7.2 K/UL (ref 4.8–10.8)

## 2021-05-03 PROCEDURE — A9270 NON-COVERED ITEM OR SERVICE: HCPCS | Performed by: STUDENT IN AN ORGANIZED HEALTH CARE EDUCATION/TRAINING PROGRAM

## 2021-05-03 PROCEDURE — A9270 NON-COVERED ITEM OR SERVICE: HCPCS | Performed by: INTERNAL MEDICINE

## 2021-05-03 PROCEDURE — 700102 HCHG RX REV CODE 250 W/ 637 OVERRIDE(OP): Performed by: STUDENT IN AN ORGANIZED HEALTH CARE EDUCATION/TRAINING PROGRAM

## 2021-05-03 PROCEDURE — 700102 HCHG RX REV CODE 250 W/ 637 OVERRIDE(OP): Performed by: INTERNAL MEDICINE

## 2021-05-03 PROCEDURE — 83735 ASSAY OF MAGNESIUM: CPT

## 2021-05-03 PROCEDURE — 97530 THERAPEUTIC ACTIVITIES: CPT

## 2021-05-03 PROCEDURE — 97116 GAIT TRAINING THERAPY: CPT

## 2021-05-03 PROCEDURE — 80053 COMPREHEN METABOLIC PANEL: CPT

## 2021-05-03 PROCEDURE — 99224 PR SUBSEQUENT OBSERVATION CARE,LEVEL I: CPT | Mod: GC | Performed by: HOSPITALIST

## 2021-05-03 PROCEDURE — 85025 COMPLETE CBC W/AUTO DIFF WBC: CPT

## 2021-05-03 PROCEDURE — 36415 COLL VENOUS BLD VENIPUNCTURE: CPT

## 2021-05-03 PROCEDURE — 700111 HCHG RX REV CODE 636 W/ 250 OVERRIDE (IP): Performed by: STUDENT IN AN ORGANIZED HEALTH CARE EDUCATION/TRAINING PROGRAM

## 2021-05-03 PROCEDURE — 96372 THER/PROPH/DIAG INJ SC/IM: CPT

## 2021-05-03 PROCEDURE — G0378 HOSPITAL OBSERVATION PER HR: HCPCS

## 2021-05-03 RX ADMIN — ASPIRIN 81 MG: 81 TABLET, COATED ORAL at 05:16

## 2021-05-03 RX ADMIN — ACETAMINOPHEN 650 MG: 325 TABLET, FILM COATED ORAL at 17:06

## 2021-05-03 RX ADMIN — ACETAMINOPHEN 650 MG: 325 TABLET, FILM COATED ORAL at 05:16

## 2021-05-03 RX ADMIN — ACETAMINOPHEN 650 MG: 325 TABLET, FILM COATED ORAL at 23:50

## 2021-05-03 RX ADMIN — MENTHOL AND METHYL SALICYLATE: 10; 30 CREAM TOPICAL at 05:19

## 2021-05-03 RX ADMIN — DOCUSATE SODIUM 50 MG AND SENNOSIDES 8.6 MG 2 TABLET: 8.6; 5 TABLET, FILM COATED ORAL at 17:06

## 2021-05-03 RX ADMIN — IBUPROFEN 400 MG: 400 TABLET, FILM COATED ORAL at 05:15

## 2021-05-03 RX ADMIN — CARBIDOPA AND LEVODOPA 1 TABLET: 25; 250 TABLET ORAL at 09:56

## 2021-05-03 RX ADMIN — TRAMADOL HYDROCHLORIDE 50 MG: 50 TABLET ORAL at 03:47

## 2021-05-03 RX ADMIN — CARBIDOPA AND LEVODOPA 1 TABLET: 25; 250 TABLET ORAL at 05:15

## 2021-05-03 RX ADMIN — MENTHOL AND METHYL SALICYLATE: 10; 30 CREAM TOPICAL at 23:22

## 2021-05-03 RX ADMIN — ROSUVASTATIN CALCIUM 40 MG: 20 TABLET, FILM COATED ORAL at 05:16

## 2021-05-03 RX ADMIN — ENOXAPARIN SODIUM 40 MG: 40 INJECTION SUBCUTANEOUS at 05:15

## 2021-05-03 RX ADMIN — CARBIDOPA AND LEVODOPA 1 TABLET: 25; 250 TABLET ORAL at 22:07

## 2021-05-03 RX ADMIN — TAMSULOSIN HYDROCHLORIDE 0.4 MG: 0.4 CAPSULE ORAL at 05:16

## 2021-05-03 RX ADMIN — CARBIDOPA AND LEVODOPA 1 TABLET: 25; 250 TABLET ORAL at 16:09

## 2021-05-03 ASSESSMENT — PAIN DESCRIPTION - PAIN TYPE
TYPE: NEUROPATHIC PAIN

## 2021-05-03 ASSESSMENT — GAIT ASSESSMENTS
DISTANCE (FEET): 200
GAIT LEVEL OF ASSIST: SUPERVISED
ASSISTIVE DEVICE: FRONT WHEEL WALKER

## 2021-05-03 ASSESSMENT — ENCOUNTER SYMPTOMS
VOMITING: 0
NAUSEA: 0
PALPITATIONS: 0
WEAKNESS: 0
CHILLS: 0
INSOMNIA: 0
COUGH: 0
SORE THROAT: 0
HEADACHES: 0
FALLS: 0
SHORTNESS OF BREATH: 0
FEVER: 0

## 2021-05-03 ASSESSMENT — COGNITIVE AND FUNCTIONAL STATUS - GENERAL
MOBILITY SCORE: 23
SUGGESTED CMS G CODE MODIFIER MOBILITY: CI
CLIMB 3 TO 5 STEPS WITH RAILING: A LITTLE

## 2021-05-03 ASSESSMENT — LIFESTYLE VARIABLES: SUBSTANCE_ABUSE: 0

## 2021-05-03 NOTE — THERAPY
Physical Therapy   Daily Treatment     Patient Name: Dinah Mathur  Age:  65 y.o., Sex:  male  Medical Record #: [id number]  Today's Date: 5/3/2021     Precautions: Fall Risk    Assessment    Rec'd pt alert, pleasant, in bed and agreeable to work w/ PT.  He is able to move in/out of bed w/o assist and w/o bed features.  He is able to stand and ambulate 200 ft w/ a fww, w/o loss of balance, w/o need of assist and w/o SOB.  He reports living w/ his brother and sister in law in a ground floor apartment.  No stairs.  Also reports that he would have assist from his brother and sister in law as they both do not work.  At this time, pt would need a fww for d/c, and he is at the level of being able to mobilize w/ nsg.  No acute PT needs.    DC Equipment Recommendations: Front-Wheel Walker  Discharge Recommendations: Recommend home health for continued physical therapy services        Objective       05/03/21 0804   Balance   Sitting Balance (Static) Fair +   Sitting Balance (Dynamic) Fair +   Standing Balance (Static) Fair   Standing Balance (Dynamic) Fair   Weight Shift Sitting Good   Weight Shift Standing Good   Comments w/ fww   Gait Analysis   Gait Level Of Assist Supervised   Assistive Device Front Wheel Walker   Distance (Feet) 200   Bed Mobility    Supine to Sit Supervised   Sit to Supine Supervised   Scooting Supervised   Functional Mobility   Sit to Stand Supervised   Anticipated Discharge Equipment and Recommendations   DC Equipment Recommendations Front-Wheel Walker   Discharge Recommendations Recommend home health for continued physical therapy services

## 2021-05-03 NOTE — PROGRESS NOTES
Daily Progress Note:     Date of Service: 5/3/2021  Primary Team: [facility] Yellow Team   Attending: Opal Zapien M.D.   Senior Resident: Navya Mccormick M.D.  Contact:  [phone]    ID:   65-year-old male with a history of chronic leg swelling as well as bilateral hand pain who is presenting with bilateral lower extremity edema.     Interval Update:  The patient is doing well this morning.  She is still complaining of hand and feet pain which is improving.  He was refusing his gabapentin overnight.  He states that the gabapentin is not helping him.  He was started on Motrin which did help him a little bit.  His insurance is still pending authorization to send him to [facility].     Consultants/Specialty:  None    Review of Systems:    Review of Systems   Constitutional: Negative for chills and fever.   HENT: Negative for congestion and sore throat.    Respiratory: Negative for cough and shortness of breath.    Cardiovascular: Negative for chest pain and palpitations.   Gastrointestinal: Negative for nausea and vomiting.   Genitourinary: Negative for dysuria and urgency.   Musculoskeletal: Positive for joint pain. Negative for falls.   Skin: Negative for itching and rash.   Neurological: Negative for weakness and headaches.   Psychiatric/Behavioral: Negative for substance abuse. The patient does not have insomnia.        Objective Data:   Physical Exam:   Vitals:   Temp:  [36.3 °C (97.3 °F)-36.4 °C (97.5 °F)] 36.3 °C (97.3 °F)  Pulse:  [] 59  Resp:  [16-17] 17  BP: (119-145)/(61-72) 122/67  SpO2:  [95 %-97 %] 95 %     Physical Exam  Vitals and nursing note reviewed.   Constitutional:       General: He is not in acute distress.     Appearance: He is not toxic-appearing.   HENT:      Head: Normocephalic and atraumatic.   Eyes:      General: No scleral icterus.     Extraocular Movements: Extraocular movements intact.      Pupils: Pupils are equal, round, and reactive to light.   Cardiovascular:      Rate and Rhythm:  Normal rate and regular rhythm.      Heart sounds: No murmur. No gallop.    Pulmonary:      Effort: Pulmonary effort is normal. No respiratory distress.      Breath sounds: Normal breath sounds.   Abdominal:      General: Abdomen is flat. There is no distension.      Palpations: Abdomen is soft.      Tenderness: There is no abdominal tenderness.   Musculoskeletal:      Right lower leg: No edema.      Left lower leg: No edema.      Comments: Trigger finger of the left right finger.    Skin:     General: Skin is warm and dry.      Capillary Refill: Capillary refill takes less than 2 seconds.      Comments: No open wounds, redness, warmth in bilateral lower extremities.  There is discoloration consistent with changes of venous stasis dermatitis. Some skin wrinkling seen.   Neurological:      General: No focal deficit present.      Mental Status: He is alert.   Psychiatric:         Mood and Affect: Mood normal.         Behavior: Behavior normal.       Labs:   No recent lab work.    Imaging:   DX-HAND 3+ LEFT   Final Result      No evidence of acute fracture or dislocation.      Degenerative changes.      No evidence of erosive arthropathy.      DX-HAND 3+ RIGHT   Final Result      No evidence of acute fracture or dislocation.      No evidence of erosive arthropathy.      Degenerative changes.         US-EXTREMITY VENOUS LOWER BILAT   Final Result      DX-CHEST-PORTABLE (1 VIEW)   Final Result      No acute cardiopulmonary process is seen.          Problem Representation:   The patient is a 65-year-old male with a past medical history of diabetes (last A1c 6.8), Parkinson's (diagnosed at an outside hospital a few months ago on Sinemet), bilateral hand arthritis, and chronic lower extremity edema.  He presented to the hospital for hand pain, bilateral lower extremity edema, and failure to thrive.    * Failure to thrive in adult  Assessment & Plan  The patient is presenting with failure to thrive.  As per the family, the  patient has some difficulty at home.  They are concerned that he may be having some neurocognitive issues at this time.    Plan:  -PT recommended SNF, OT stated that the patient was in a lot of pain during the evaluation.  We will ask OT to reevaluate now that the patient's pain is improved.  -Neurocognitive eval recommends cognitive therapy at SNF. Working on finding a SNF that can accept him as well as a potential group home to take him after SNF stay.   -Social work is working with the family. His plan after he is discharged from SNF is to go back home with the brother until outpatient SW can place him in a group home.     Venous stasis dermatitis of both lower extremities  Assessment & Plan  The rash that the patient has is likely venous stasis dermatitis in his bilateral lower extremities.  Edema is likely related to venous insufficiency as well.  The patient had a negative BNP, negative DVT ultrasound, strong pulses on exam, negative ESR and CRP, and as per the patient this has been chronic and ongoing for years.  His edema had improved this morning as well after he had been laying down all night.  This makes it much less likely to be heart failure and more likely to be related to venous stasis.    Plan:  -Compression stockings ordered.  This is improving with elevation as well.      Parkinson disease ([facility])  Assessment & Plan  Per the patient, he was diagnosed with Parkinson's disease in [place] a few months back.  The patient is on Sinemet currently 4 times a day.    Plan:  -Continue Sinemet.  -The patient will need to be followed as an outpatient by his primary care provider for further work-up of this.    Osteoarthritis of both hands  Assessment & Plan  The patient has a history of chronic bilateral hand pain.  He worked as a  for a while.  His x-rays are consistent with osteoarthritis.  ESR and CRP were negative.    Plan:  -Added IcyHot for arthritic pain  -Continue scheduled Tylenol every 6  hours  -Added tramadol as needed for pain  -As needed ibuprofen for pain  -Patient was also on meloxicam at home.  However, we will just use tramadol inpatient for now.    DM (diabetes mellitus) ([facility])  Assessment & Plan  The patient has a history of diabetes mellitus on Metformin.  Last A1c was this admission and it was 6.8.    Plan:  - Accuchecks have been fine. Will discontinue      DVT ppx: Lovenox  Diet: Diabetic  Tubes/Lines: PIV  Code status: FULL    Navya Mccormick M.D.

## 2021-05-03 NOTE — PROGRESS NOTES
Assessment/description of ears? Dry, intact, and blanching  Which preventative measures are in place for the ears? Remove glasses for sleeping.;     Assessment/description of elbows? Intact, dry.   Which preventative measures are in place for the elbows? NA    Assessment/description of sacrum? Intact, blanching  Which preventative measures are in place for the sacrum? NA     Assessment/description of heels? Intact, blanching, dry.   Which preventative measures are in place for the heels? Mepilex heels     Which devices are in place? Glasses, P*IV   Description of skin under devices: Intact, blanching.   Which preventative measures are in place under devices? Remove glasses for sleeping     Other:

## 2021-05-03 NOTE — CARE PLAN
Problem: Communication  Goal: The ability to communicate needs accurately and effectively will improve  Outcome: PROGRESSING AS EXPECTED     Problem: Safety  Goal: Will remain free from injury  Outcome: PROGRESSING AS EXPECTED  Note: Bed alarm on. Pt calls for assistance before getting out of bed.      Problem: Venous Thromboembolism (VTW)/Deep Vein Thrombosis (DVT) Prevention:  Goal: Patient will participate in Venous Thrombosis (VTE)/Deep Vein Thrombosis (DVT)Prevention Measures  Outcome: PROGRESSING AS EXPECTED  Flowsheets (Taken 5/3/2021 0053)  Pharmacologic Prophylaxis Used: LMWH: Enoxaparin(Lovenox)     Problem: Pain Management  Goal: Pain level will decrease to patient's comfort goal  Outcome: PROGRESSING AS EXPECTED  Note: See MAR for pain management.

## 2021-05-03 NOTE — CARE PLAN
Problem: Pain Management  Goal: Pain level will decrease to patient's comfort goal  Outcome: PROGRESSING AS EXPECTED  Note: Pt has pain to his bilateral hands and feet.  Pain is being managed with both pharmacological and non-pharmacological interventions.       Problem: Mobility  Goal: Risk for activity intolerance will decrease  Outcome: PROGRESSING AS EXPECTED  Note: Pt is working with physical therapy. PT recommends pt ambulate with FWW. Pt agreeable.

## 2021-05-03 NOTE — PROGRESS NOTES
Assumed care of patient this morning. Patient is A&O x 4. Pt is on room air. Bed alarm is on. Patient updated on plan of care. Pt has no questions or concerns at this time and call light is within reach. Hourly rounding in place.

## 2021-05-03 NOTE — PROGRESS NOTES
Ears: Intact and blanching  Which preventative measures are in place for the ears? Encourage pt to remove glasses when he doesn't need them    Elbows: Intact and blanching  Which preventative measures are in place for the elbows? Pt encouraged to reposition frequently     Sacrum: Intact and blanching  Which preventative measures are in place for the sacrum? Pt encouraged to reposition frequently     Heels: Intact and blanching  Which preventative measures are in place for the heels? Heel mepilex    Which devices are in place? PIV    Description of skin under devices: Clean and dry    Which preventative measures are in place under devices? N/A

## 2021-05-03 NOTE — DISCHARGE PLANNING
Agency/Facility Name: [facility]  Spoke To: [facility]  Outcome: Still waiting on auth.    PAT Hernandez notified via Teams

## 2021-05-03 NOTE — PROGRESS NOTES
Received bedside report from day shift RN, pt care assumed, VS stable. Pt AAOx4. No signs of acute distress noted at this time. POC discussed with pt and verbalizes no questions. Pt denies any additional needs at this time. Bed in lowest position, bed alarm on. Pt educated on fall risk and verbalized understanding, call light within reach, hourly rounding initiated.

## 2021-05-04 PROCEDURE — 96372 THER/PROPH/DIAG INJ SC/IM: CPT

## 2021-05-04 PROCEDURE — 700111 HCHG RX REV CODE 636 W/ 250 OVERRIDE (IP): Performed by: STUDENT IN AN ORGANIZED HEALTH CARE EDUCATION/TRAINING PROGRAM

## 2021-05-04 PROCEDURE — A9270 NON-COVERED ITEM OR SERVICE: HCPCS | Performed by: STUDENT IN AN ORGANIZED HEALTH CARE EDUCATION/TRAINING PROGRAM

## 2021-05-04 PROCEDURE — 700102 HCHG RX REV CODE 250 W/ 637 OVERRIDE(OP): Performed by: STUDENT IN AN ORGANIZED HEALTH CARE EDUCATION/TRAINING PROGRAM

## 2021-05-04 PROCEDURE — G0378 HOSPITAL OBSERVATION PER HR: HCPCS

## 2021-05-04 PROCEDURE — 99224 PR SUBSEQUENT OBSERVATION CARE,LEVEL I: CPT | Mod: GC | Performed by: HOSPITALIST

## 2021-05-04 RX ADMIN — CARBIDOPA AND LEVODOPA 1 TABLET: 25; 250 TABLET ORAL at 23:12

## 2021-05-04 RX ADMIN — CARBIDOPA AND LEVODOPA 1 TABLET: 25; 250 TABLET ORAL at 19:41

## 2021-05-04 RX ADMIN — CARBIDOPA AND LEVODOPA 1 TABLET: 25; 250 TABLET ORAL at 05:44

## 2021-05-04 RX ADMIN — ENOXAPARIN SODIUM 40 MG: 40 INJECTION SUBCUTANEOUS at 05:53

## 2021-05-04 RX ADMIN — GABAPENTIN 300 MG: 300 CAPSULE ORAL at 18:36

## 2021-05-04 RX ADMIN — ACETAMINOPHEN 650 MG: 325 TABLET, FILM COATED ORAL at 23:12

## 2021-05-04 RX ADMIN — CARBIDOPA AND LEVODOPA 1 TABLET: 25; 250 TABLET ORAL at 10:28

## 2021-05-04 RX ADMIN — TAMSULOSIN HYDROCHLORIDE 0.4 MG: 0.4 CAPSULE ORAL at 05:53

## 2021-05-04 RX ADMIN — GABAPENTIN 300 MG: 300 CAPSULE ORAL at 05:53

## 2021-05-04 RX ADMIN — ASPIRIN 81 MG: 81 TABLET, COATED ORAL at 05:53

## 2021-05-04 RX ADMIN — DOCUSATE SODIUM 50 MG AND SENNOSIDES 8.6 MG 2 TABLET: 8.6; 5 TABLET, FILM COATED ORAL at 05:54

## 2021-05-04 RX ADMIN — ROSUVASTATIN CALCIUM 40 MG: 20 TABLET, FILM COATED ORAL at 05:52

## 2021-05-04 ASSESSMENT — ENCOUNTER SYMPTOMS
WEAKNESS: 0
VOMITING: 0
SORE THROAT: 0
HEADACHES: 0
CHILLS: 0
FALLS: 0
PALPITATIONS: 0
INSOMNIA: 0
NAUSEA: 0
FEVER: 0
COUGH: 0
SHORTNESS OF BREATH: 0

## 2021-05-04 ASSESSMENT — PAIN DESCRIPTION - PAIN TYPE
TYPE: NEUROPATHIC PAIN
TYPE: ACUTE PAIN
TYPE: NEUROPATHIC PAIN
TYPE: ACUTE PAIN

## 2021-05-04 ASSESSMENT — LIFESTYLE VARIABLES: SUBSTANCE_ABUSE: 0

## 2021-05-04 NOTE — CARE PLAN
Problem: Safety  Goal: Will remain free from falls  Outcome: PROGRESSING AS EXPECTED  Note: Educated to call for assistance. FWW removed from bedside. Treaded socks in use. Alarm on.     Problem: Pain Management  Goal: Pain level will decrease to patient's comfort goal  Outcome: PROGRESSING AS EXPECTED  Note: Educated about pain management. Medicated per MAR. Non pharmacologic interventions offered.

## 2021-05-04 NOTE — PROGRESS NOTES
Daily Progress Note:     Date of Service: 5/4/2021  Primary Team: [facility] IM Yellow Team   Attending: Opal Zapien M.D.   Senior Resident: Navya Mccormick M.D.  Contact:  [phone]    ID:   65-year-old male with a history of chronic leg swelling as well as bilateral hand pain who is presenting with bilateral lower extremity edema.     Interval Update:  The patient is doing well this morning.  No acute overnight events.  Per social work they will reach out to his insurance to see if they approve of SNF.  The patient and his family is filled out a Medicare waiver for group home placement.  However, this will take a few months.  Therefore, after the patient leaves SNF he will be going back with his family until he is placed in a group home.    Consultants/Specialty:  None    Review of Systems:    Review of Systems   Constitutional: Negative for chills and fever.   HENT: Negative for congestion and sore throat.    Respiratory: Negative for cough and shortness of breath.    Cardiovascular: Negative for chest pain and palpitations.   Gastrointestinal: Negative for nausea and vomiting.   Genitourinary: Negative for dysuria and urgency.   Musculoskeletal: Positive for joint pain. Negative for falls.   Skin: Negative for itching and rash.   Neurological: Negative for weakness and headaches.   Psychiatric/Behavioral: Negative for substance abuse. The patient does not have insomnia.        Objective Data:   Physical Exam:   Vitals:   Temp:  [36.1 °C (97 °F)-36.6 °C (97.8 °F)] 36.2 °C (97.1 °F)  Pulse:  [60-82] 64  Resp:  [16-17] 16  BP: (115-143)/(68-94) 124/94  SpO2:  [94 %-96 %] 96 %     Physical Exam  Vitals and nursing note reviewed.   Constitutional:       General: He is not in acute distress.     Appearance: He is not toxic-appearing.   HENT:      Head: Normocephalic and atraumatic.   Eyes:      General: No scleral icterus.     Extraocular Movements: Extraocular movements intact.      Pupils: Pupils are equal, round, and  reactive to light.   Cardiovascular:      Rate and Rhythm: Normal rate and regular rhythm.      Heart sounds: No murmur. No gallop.    Pulmonary:      Effort: Pulmonary effort is normal. No respiratory distress.      Breath sounds: Normal breath sounds.   Abdominal:      General: Abdomen is flat. There is no distension.      Palpations: Abdomen is soft.      Tenderness: There is no abdominal tenderness.   Musculoskeletal:      Right lower leg: No edema.      Left lower leg: No edema.      Comments: Trigger finger of the left right finger.    Skin:     General: Skin is warm and dry.      Capillary Refill: Capillary refill takes less than 2 seconds.      Comments: No open wounds, redness, warmth in bilateral lower extremities.  There is discoloration consistent with changes of venous stasis dermatitis. Some skin wrinkling seen.   Neurological:      General: No focal deficit present.      Mental Status: He is alert.   Psychiatric:         Mood and Affect: Mood normal.         Behavior: Behavior normal.       Labs:   No recent lab work.    Imaging:   DX-HAND 3+ LEFT   Final Result      No evidence of acute fracture or dislocation.      Degenerative changes.      No evidence of erosive arthropathy.      DX-HAND 3+ RIGHT   Final Result      No evidence of acute fracture or dislocation.      No evidence of erosive arthropathy.      Degenerative changes.         US-EXTREMITY VENOUS LOWER BILAT   Final Result      DX-CHEST-PORTABLE (1 VIEW)   Final Result      No acute cardiopulmonary process is seen.          Problem Representation:   The patient is a 65-year-old male with a past medical history of diabetes (last A1c 6.8), Parkinson's (diagnosed at an outside hospital a few months ago on Sinemet), bilateral hand arthritis, and chronic lower extremity edema.  He presented to the hospital for hand pain, bilateral lower extremity edema, and failure to thrive.    * Failure to thrive in adult  Assessment & Plan  The patient is  presenting with failure to thrive.  As per the family, the patient has some difficulty at home.  They are concerned that he may be having some neurocognitive issues at this time.    Plan:  -PT recommended SNF, OT stated that the patient was in a lot of pain during the evaluation.  We will ask OT to reevaluate now that the patient's pain is improved.  -Neurocognitive eval recommends cognitive therapy at SNF. Working on finding a SNF that can accept him as well as a potential group home to take him after SNF stay.   -Social work is working with the family. His plan after he is discharged from SNF is to go back home with the brother until outpatient SW can place him in a group home.     Venous stasis dermatitis of both lower extremities  Assessment & Plan  The rash that the patient has is likely venous stasis dermatitis in his bilateral lower extremities.  Edema is likely related to venous insufficiency as well.  The patient had a negative BNP, negative DVT ultrasound, strong pulses on exam, negative ESR and CRP, and as per the patient this has been chronic and ongoing for years.  His edema had improved this morning as well after he had been laying down all night.  This makes it much less likely to be heart failure and more likely to be related to venous stasis.    Plan:  -Compression stockings ordered.  This is improving with elevation as well.      Parkinson disease (HCC)  Assessment & Plan  Per the patient, he was diagnosed with Parkinson's disease in [place] a few months back.  The patient is on Sinemet currently 4 times a day.    Plan:  -Continue Sinemet.  -The patient will need to be followed as an outpatient by his primary care provider for further work-up of this.    Osteoarthritis of both hands  Assessment & Plan  The patient has a history of chronic bilateral hand pain.  He worked as a  for a while.  His x-rays are consistent with osteoarthritis.  ESR and CRP were negative.    Plan:  -Added IcyHot for  arthritic pain  -Continue scheduled Tylenol every 6 hours  -Added tramadol as needed for pain  -As needed ibuprofen for pain  -Patient was also on meloxicam at home.  However, we will just use tramadol inpatient for now.    DM (diabetes mellitus) ([facility])  Assessment & Plan  The patient has a history of diabetes mellitus on Metformin.  Last A1c was this admission and it was 6.8.    Plan:  - Accuchecks have been fine. Will discontinue      DVT ppx: Lovenox  Diet: Diabetic  Tubes/Lines: PIV  Code status: FULL    Navya Mccormick M.D.

## 2021-05-04 NOTE — PROGRESS NOTES
Assumed care of pt at shift change, report received. Pt A&Ox3, resting comfortably in bed. C/o 3/10 bilateral hands and feet pain, declines intervention at this time. Denies any other s/s of distress. Medications and POC reviewed with pt, questions answered.  Bed locked and in low position, alarm on.

## 2021-05-04 NOTE — PROGRESS NOTES
Assessment/description of ears? Pink, intact  Which preventative measures are in place for the ears? Encouraged to take glasses off to sleep           Assessment/description of elbows?intact, pink  Which preventative measures are in place for the elbows? Pt encouraged to reposition in bed frequently    Assessment/description of sacrum? Intact, pink, discoloration  Which preventative measures are in place for the sacrum? Pt encouraged to reposition in bed frequently    Assessment/description of heels? Intact, pink, dryness  Which preventative measures are in place for the heels?  Pt encouraged to reposition in bed frequently    Which devices are in place? Glasses, PIV  Description of skin under devices: Intact  Which preventative measures are in place under devices? N/A    Other:

## 2021-05-04 NOTE — DISCHARGE PLANNING
Agency/Facility Name: [facility]  Spoke To: Hermilo  Outcome: Still has not received insurance auth.

## 2021-05-05 PROCEDURE — 96372 THER/PROPH/DIAG INJ SC/IM: CPT

## 2021-05-05 PROCEDURE — A9270 NON-COVERED ITEM OR SERVICE: HCPCS | Performed by: INTERNAL MEDICINE

## 2021-05-05 PROCEDURE — 700102 HCHG RX REV CODE 250 W/ 637 OVERRIDE(OP): Performed by: STUDENT IN AN ORGANIZED HEALTH CARE EDUCATION/TRAINING PROGRAM

## 2021-05-05 PROCEDURE — G0378 HOSPITAL OBSERVATION PER HR: HCPCS

## 2021-05-05 PROCEDURE — 99224 PR SUBSEQUENT OBSERVATION CARE,LEVEL I: CPT | Mod: GC | Performed by: HOSPITALIST

## 2021-05-05 PROCEDURE — A9270 NON-COVERED ITEM OR SERVICE: HCPCS | Performed by: STUDENT IN AN ORGANIZED HEALTH CARE EDUCATION/TRAINING PROGRAM

## 2021-05-05 PROCEDURE — 700102 HCHG RX REV CODE 250 W/ 637 OVERRIDE(OP): Performed by: INTERNAL MEDICINE

## 2021-05-05 PROCEDURE — 700111 HCHG RX REV CODE 636 W/ 250 OVERRIDE (IP): Performed by: STUDENT IN AN ORGANIZED HEALTH CARE EDUCATION/TRAINING PROGRAM

## 2021-05-05 RX ORDER — GABAPENTIN 100 MG/1
100 CAPSULE ORAL 2 TIMES DAILY
Status: DISCONTINUED | OUTPATIENT
Start: 2021-05-05 | End: 2021-05-06

## 2021-05-05 RX ORDER — ACETAMINOPHEN 325 MG/1
325 TABLET ORAL EVERY 6 HOURS PRN
Status: DISCONTINUED | OUTPATIENT
Start: 2021-05-05 | End: 2021-05-09

## 2021-05-05 RX ADMIN — CARBIDOPA AND LEVODOPA 1 TABLET: 25; 250 TABLET ORAL at 05:21

## 2021-05-05 RX ADMIN — CARBIDOPA AND LEVODOPA 1 TABLET: 25; 250 TABLET ORAL at 17:21

## 2021-05-05 RX ADMIN — ROSUVASTATIN CALCIUM 40 MG: 20 TABLET, FILM COATED ORAL at 05:12

## 2021-05-05 RX ADMIN — DOCUSATE SODIUM 50 MG AND SENNOSIDES 8.6 MG 2 TABLET: 8.6; 5 TABLET, FILM COATED ORAL at 17:21

## 2021-05-05 RX ADMIN — ENOXAPARIN SODIUM 40 MG: 40 INJECTION SUBCUTANEOUS at 05:11

## 2021-05-05 RX ADMIN — ACETAMINOPHEN 650 MG: 325 TABLET, FILM COATED ORAL at 05:12

## 2021-05-05 RX ADMIN — CARBIDOPA AND LEVODOPA 1 TABLET: 25; 250 TABLET ORAL at 23:30

## 2021-05-05 RX ADMIN — ACETAMINOPHEN 325 MG: 325 TABLET, FILM COATED ORAL at 23:29

## 2021-05-05 RX ADMIN — GABAPENTIN 100 MG: 100 CAPSULE ORAL at 17:21

## 2021-05-05 RX ADMIN — ASPIRIN 81 MG: 81 TABLET, COATED ORAL at 05:12

## 2021-05-05 RX ADMIN — ACETAMINOPHEN 650 MG: 325 TABLET, FILM COATED ORAL at 11:22

## 2021-05-05 RX ADMIN — CARBIDOPA AND LEVODOPA 1 TABLET: 25; 250 TABLET ORAL at 11:22

## 2021-05-05 RX ADMIN — TAMSULOSIN HYDROCHLORIDE 0.4 MG: 0.4 CAPSULE ORAL at 05:12

## 2021-05-05 RX ADMIN — ACETAMINOPHEN 325 MG: 325 TABLET, FILM COATED ORAL at 17:21

## 2021-05-05 RX ADMIN — TRAMADOL HYDROCHLORIDE 50 MG: 50 TABLET ORAL at 22:06

## 2021-05-05 ASSESSMENT — ENCOUNTER SYMPTOMS
NAUSEA: 0
SHORTNESS OF BREATH: 0
VOMITING: 0
FALLS: 0
COUGH: 0
FEVER: 0
SORE THROAT: 0
PALPITATIONS: 0
WEAKNESS: 0
HEADACHES: 0
INSOMNIA: 0
CHILLS: 0

## 2021-05-05 ASSESSMENT — PAIN DESCRIPTION - PAIN TYPE
TYPE: NEUROPATHIC PAIN

## 2021-05-05 ASSESSMENT — LIFESTYLE VARIABLES: SUBSTANCE_ABUSE: 0

## 2021-05-05 NOTE — PROGRESS NOTES
Daily Progress Note:     Date of Service: 5/5/2021  Primary Team: [facility] IM Yellow Team   Attending: Opal Zapien M.D.   Senior Resident: Navya Mccormick M.D.  Contact:  [phone]    ID:   65-year-old male with a history of chronic leg swelling as well as bilateral hand pain who is presenting with bilateral lower extremity edema.     Interval Update:  Overnight the patient was refusing his gabapentin because he did not like the dosage.  He would like to go back down to the 100 mg twice a day.  Otherwise, no acute events.  We are still awaiting for his insurance to authorize SNF.    Consultants/Specialty:  None    Review of Systems:    Review of Systems   Constitutional: Negative for chills and fever.   HENT: Negative for congestion and sore throat.    Respiratory: Negative for cough and shortness of breath.    Cardiovascular: Negative for chest pain and palpitations.   Gastrointestinal: Negative for nausea and vomiting.   Genitourinary: Negative for dysuria and urgency.   Musculoskeletal: Positive for joint pain. Negative for falls.   Skin: Negative for itching and rash.   Neurological: Negative for weakness and headaches.   Psychiatric/Behavioral: Negative for substance abuse. The patient does not have insomnia.        Objective Data:   Physical Exam:   Vitals:   Temp:  [36.1 °C (97 °F)-36.9 °C (98.4 °F)] 36.1 °C (97 °F)  Pulse:  [56-64] 56  Resp:  [16-17] 16  BP: (102-113)/(43-59) 103/43  SpO2:  [92 %-95 %] 95 %     Physical Exam  Vitals and nursing note reviewed.   Constitutional:       General: He is not in acute distress.     Appearance: He is not toxic-appearing.   HENT:      Head: Normocephalic and atraumatic.   Eyes:      General: No scleral icterus.     Extraocular Movements: Extraocular movements intact.      Pupils: Pupils are equal, round, and reactive to light.   Cardiovascular:      Rate and Rhythm: Normal rate and regular rhythm.      Heart sounds: No murmur. No gallop.    Pulmonary:      Effort:  Pulmonary effort is normal. No respiratory distress.      Breath sounds: Normal breath sounds.   Abdominal:      General: Abdomen is flat. There is no distension.      Palpations: Abdomen is soft.      Tenderness: There is no abdominal tenderness.   Musculoskeletal:      Right lower leg: No edema.      Left lower leg: No edema.      Comments: Trigger finger of the left right finger.    Skin:     General: Skin is warm and dry.      Capillary Refill: Capillary refill takes less than 2 seconds.      Comments: No open wounds, redness in bilateral lower extremities.     Neurological:      General: No focal deficit present.      Mental Status: He is alert.   Psychiatric:         Mood and Affect: Mood normal.         Behavior: Behavior normal.       Labs:   No recent lab work.    Imaging:   No recent imaging    Problem Representation:   The patient is a 65-year-old male with a past medical history of diabetes (last A1c 6.8), Parkinson's (diagnosed at an outside hospital a few months ago on Sinemet), bilateral hand arthritis, and chronic lower extremity edema.  He presented to the hospital for hand pain, bilateral lower extremity edema, and failure to thrive.    * Failure to thrive in adult  Assessment & Plan  The patient is presenting with failure to thrive.  As per the family, the patient has some difficulty at home.  They are concerned that he may be having some neurocognitive issues at this time.    Plan:  -PT recommended SNF  -Neurocognitive eval recommends cognitive therapy at SNF. Working on finding a SNF that can accept him as well as a potential group home to take him after SNF stay.   -Social work is working with the family. His plan after he is discharged from SNF is to go back home with the brother until outpatient SW can place him in a group home.     Venous stasis dermatitis of both lower extremities  Assessment & Plan  The rash that the patient has is likely venous stasis dermatitis in his bilateral lower  extremities.  Edema is likely related to venous insufficiency as well.  The patient had a negative BNP, negative DVT ultrasound, strong pulses on exam, negative ESR and CRP, and as per the patient this has been chronic and ongoing for years.  His edema had improved this morning as well after he had been laying down all night.  This makes it much less likely to be heart failure and more likely to be related to venous stasis.    Plan:  -Continue compression stockings and elevation      Parkinson disease ([facility])  Assessment & Plan  Per the patient, he was diagnosed with Parkinson's disease in [place] a few months back.  The patient is on Sinemet currently 4 times a day.    Plan:  -Continue Sinemet.  -The patient will need to be followed as an outpatient by his primary care provider for further work-up of this.    Osteoarthritis of both hands  Assessment & Plan  The patient has a history of chronic bilateral hand pain.  He worked as a  for a while.  His x-rays are consistent with osteoarthritis.  ESR and CRP were negative.    Plan:  -Added IcyHot for arthritic pain  -Continue scheduled Tylenol every 6 hours  -Added tramadol as needed for pain  -As needed ibuprofen for pain  -Continue gabapentin  -Patient was also on meloxicam at home.  However, we will just use tramadol inpatient for now.    DM (diabetes mellitus) ([facility])  Assessment & Plan  The patient has a history of diabetes mellitus on Metformin.  Last A1c was this admission and it was 6.8.    Plan:  - Accuchecks have been fine. Will discontinue      DVT ppx: Lovenox  Diet: Diabetic  Tubes/Lines: PIV  Code status: FULL    Navya Mccormick M.D.

## 2021-05-05 NOTE — PROGRESS NOTES
Patient A&O x 3. Sitting up in bed watching TV. Brother at bedside. Discharge pending insurance auth - patient excited to be discharged soon. Complains of neuropathic pain in bilateral hands and feet - scheduled and PRN meds administered per MAR to help with this. Patient wanted scheduled Gabapentin changed from 300mg to 100mg as this is what he takes at home. Plans to ambulate later today with staff.  2001: Moira Alan. Patient only wanting to take 325mg of the scheduled 650mg scheduled Tylenol dose. MD Alan states she will add PRN Q6 Tylenol 325 mg and then speak with the day team tomorrow morning about discontinuing the scheduled 650mg.      Assessment/description of ears? Intact, pink blanching.  Which preventative measures are in place for the ears? Patient wears glasses to read throughout the day. Verbalizes understanding of importance of removing them frequently to give ears a break.     Assessment/description of elbows? Intact, pink, blanching.  Which preventative measures are in place for the elbows? Patient sits EOB for meals, lays in bed with hands across his chest (due to chronic hand pain) which float his elbows.     Assessment/description of sacrum? Intact, discolored.  Which preventative measures are in place for the sacrum? Encourage mobilization during the day, plans to place waffle mattress. Waffle mattress placed at 1200.     Assessment/description of heels? Red, intact, blanching  Which preventative measures are in place for the heels? Heel float boots placed. Patient wanting to have them removed while he sits EOB to eat meals but plans to put them back on. Education provided about importance of decreasing the amount of pressure to heels to prevent skin breakdown. 1300 - patient refusing to wear heel float boots as they increase his neuropathic pain in his feet too much. Heels floated on pillows instead.     Which devices are in place? PIV, glasses, heel float boots  Description of skin under  devices: Intact, pink, blanching  Which preventative measures are in place under devices? Q shift assessment under devices.

## 2021-05-05 NOTE — CARE PLAN
Problem: Knowledge Deficit  Goal: Knowledge of disease process/condition, treatment plan, diagnostic tests, and medications will improve  Outcome: PROGRESSING AS EXPECTED  Note: Pt refuses some medications at times, statin he doesn't want to take more medications than he needs. Educated about treatment plan, questions answered, reinforcement needed.      Problem: Pain Management  Goal: Pain level will decrease to patient's comfort goal  Outcome: PROGRESSING AS EXPECTED  Note: Pain with neuropathic pain, pharmacological and non pharmacological interventions offered/provided.

## 2021-05-05 NOTE — PROGRESS NOTES
Late entry 1000: Patient is AAO x 3, to situation patient just states, was not feeling well. Discussed with patient POC and SW looking into [facility] in order to go there and get stronger. Discussed being out of bed for meals. Encouraged patient to mobilize in the halls ways though he did not accept in the morning. Bed is locked and in low position, patient demonstrates ability to use call light.       Assessment/description of ears? Pink, intact, blanching  Which preventative measures are in place for the ears? Removing glasses while patient sleeps, encourages rest periods if he isn't actively reading or watching tv    Assessment/description of elbows? Pink, blanching, intact   Which preventative measures are in place for the elbows? Patient able to turn self, encouraged to turn self frequently. Also uses pillow to rest arms at times    Assessment/description of sacrum? Discolored, blanching, intact.   Which preventative measures are in place for the sacrum? Up at the edge of bed for meals. Ambulating to the bathroom. Ordered a waffle overlay.     Assessment/description of heels? Pink, blanching, intact   Which preventative measures are in place for the heels? Out of bed to relieve pressure for meals     Which devices are in place? PIV and glasses   Description of skin under devices: intact   Which preventative measures are in place under devices? Removing glasses, getting out of bed, encouraging patient to change positions in bed frequently    Other:

## 2021-05-05 NOTE — CARE PLAN
Problem: Discharge Barriers/Planning  Goal: Patient's continuum of care needs will be met  Outcome: PROGRESSING AS EXPECTED  Intervention: Collaborate with Transitional Care Team and Interdisciplinary Team to meet discharge needs  Note: Patient is medically clear and pending acceptance into a SNF. Collaborating with  to meet patient discharge requirements. Patient is currently pending insurance auth from [facility]. Updated family regarding the process and what is pending. Will continue to assist in discharge needs.      Problem: Pain Management  Goal: Pain level will decrease to patient's comfort goal  Outcome: PROGRESSING SLOWER THAN EXPECTED  Intervention: Follow pain managment plan developed in collaboration with patient and Interdisciplinary Team  Note: Patient complains of neuropathy pain however has refused scheduled tylenol. Patient states that he feels that the gabapentin is too high as he was taking 100 mg, education provided as to why this was increased due to increased pain. Patient states he does not feel like tylenol works for him and he would prefer not to take things that don't work. Will continue to educate patient about medications and assess for understanding as he currently has refused some intermittently.

## 2021-05-05 NOTE — PROGRESS NOTES
Assumed care of pt at shift change, report received. Pt sleeping, easy to arouse. A&Ox3, disoriented to situation. Denies pain or discomfort at this time, states he is sleepy and wants to rest. Bed locked and in low position, alarm on.         Assessment/description of ears? Red, blanching, intact.   Which preventative measures are in place for the ears? Glasses removed for the night.     Assessment/description of elbows? Pink, intact.  Which preventative measures are in place for the elbows? Encourage mobilization during the day and turning and repositioning while in bed.    Assessment/description of sacrum? Intact, discolored.  Which preventative measures are in place for the sacrum? Encourage mobilization during the day and turning and repositioning while in bed.    Assessment/description of heels? Pink, intact.  Which preventative measures are in place for the heels? Pillows in use to elevate the heels.    Which devices are in place? PIV, glasses  Description of skin under devices: Intact  Which preventative measures are in place under devices? Q shift assessment under devices.    Other:

## 2021-05-05 NOTE — CARE PLAN
Problem: Pain Management  Goal: Pain level will decrease to patient's comfort goal  Outcome: PROGRESSING AS EXPECTED  Patient taking scheduled Tylenol and Gabapentin for nerve pain in bilateral hands and feet - got Gabapentin changed to his home dose of 100mg. Also has PRN Icy Hot when needed. Hot packs given to patient for hands - patient states heat helps.     Problem: Mobility  Goal: Risk for activity intolerance will decrease  Outcome: PROGRESSING AS EXPECTED  Patient plans to ambulate in the evening. Is up with 1 assist and use of FWW. Has goals to sit EOB or in the chair for all meals. Brother at bedside wishes to ambulate with patient as well.

## 2021-05-06 PROCEDURE — 99225 PR SUBSEQUENT OBSERVATION CARE,LEVEL II: CPT | Mod: GC | Performed by: HOSPITALIST

## 2021-05-06 PROCEDURE — A9270 NON-COVERED ITEM OR SERVICE: HCPCS | Performed by: STUDENT IN AN ORGANIZED HEALTH CARE EDUCATION/TRAINING PROGRAM

## 2021-05-06 PROCEDURE — A9270 NON-COVERED ITEM OR SERVICE: HCPCS | Performed by: HOSPITALIST

## 2021-05-06 PROCEDURE — 97130 THER IVNTJ EA ADDL 15 MIN: CPT

## 2021-05-06 PROCEDURE — 97129 THER IVNTJ 1ST 15 MIN: CPT

## 2021-05-06 PROCEDURE — 700102 HCHG RX REV CODE 250 W/ 637 OVERRIDE(OP): Performed by: INTERNAL MEDICINE

## 2021-05-06 PROCEDURE — 700102 HCHG RX REV CODE 250 W/ 637 OVERRIDE(OP): Performed by: STUDENT IN AN ORGANIZED HEALTH CARE EDUCATION/TRAINING PROGRAM

## 2021-05-06 PROCEDURE — A9270 NON-COVERED ITEM OR SERVICE: HCPCS | Performed by: INTERNAL MEDICINE

## 2021-05-06 PROCEDURE — G0378 HOSPITAL OBSERVATION PER HR: HCPCS

## 2021-05-06 PROCEDURE — 700102 HCHG RX REV CODE 250 W/ 637 OVERRIDE(OP): Performed by: HOSPITALIST

## 2021-05-06 RX ORDER — AMITRIPTYLINE HYDROCHLORIDE 25 MG/1
25 TABLET, FILM COATED ORAL EVERY EVENING
Status: DISCONTINUED | OUTPATIENT
Start: 2021-05-06 | End: 2021-05-14 | Stop reason: HOSPADM

## 2021-05-06 RX ORDER — GABAPENTIN 300 MG/1
300 CAPSULE ORAL 2 TIMES DAILY
Status: DISCONTINUED | OUTPATIENT
Start: 2021-05-06 | End: 2021-05-09

## 2021-05-06 RX ADMIN — TAMSULOSIN HYDROCHLORIDE 0.4 MG: 0.4 CAPSULE ORAL at 05:21

## 2021-05-06 RX ADMIN — ROSUVASTATIN CALCIUM 40 MG: 20 TABLET, FILM COATED ORAL at 05:21

## 2021-05-06 RX ADMIN — CARBIDOPA AND LEVODOPA 1 TABLET: 25; 250 TABLET ORAL at 11:36

## 2021-05-06 RX ADMIN — TRAMADOL HYDROCHLORIDE 50 MG: 50 TABLET ORAL at 09:49

## 2021-05-06 RX ADMIN — GABAPENTIN 100 MG: 100 CAPSULE ORAL at 05:21

## 2021-05-06 RX ADMIN — ACETAMINOPHEN 650 MG: 325 TABLET, FILM COATED ORAL at 11:36

## 2021-05-06 RX ADMIN — CARBIDOPA AND LEVODOPA 1 TABLET: 25; 250 TABLET ORAL at 22:38

## 2021-05-06 RX ADMIN — IBUPROFEN 400 MG: 400 TABLET, FILM COATED ORAL at 00:57

## 2021-05-06 RX ADMIN — GABAPENTIN 300 MG: 300 CAPSULE ORAL at 17:26

## 2021-05-06 RX ADMIN — ACETAMINOPHEN 650 MG: 325 TABLET, FILM COATED ORAL at 05:21

## 2021-05-06 RX ADMIN — TRAMADOL HYDROCHLORIDE 50 MG: 50 TABLET ORAL at 22:33

## 2021-05-06 RX ADMIN — AMITRIPTYLINE HYDROCHLORIDE 25 MG: 25 TABLET, FILM COATED ORAL at 17:26

## 2021-05-06 RX ADMIN — ASPIRIN 81 MG: 81 TABLET, COATED ORAL at 05:21

## 2021-05-06 RX ADMIN — CARBIDOPA AND LEVODOPA 1 TABLET: 25; 250 TABLET ORAL at 17:26

## 2021-05-06 RX ADMIN — CARBIDOPA AND LEVODOPA 1 TABLET: 25; 250 TABLET ORAL at 05:21

## 2021-05-06 RX ADMIN — DOCUSATE SODIUM 50 MG AND SENNOSIDES 8.6 MG 2 TABLET: 8.6; 5 TABLET, FILM COATED ORAL at 17:26

## 2021-05-06 ASSESSMENT — ENCOUNTER SYMPTOMS
SHORTNESS OF BREATH: 0
VOMITING: 0
PALPITATIONS: 0
CHILLS: 0
FALLS: 0
INSOMNIA: 0
HEADACHES: 0
NAUSEA: 0
FEVER: 0
COUGH: 0
WEAKNESS: 0
SORE THROAT: 0

## 2021-05-06 ASSESSMENT — PAIN DESCRIPTION - PAIN TYPE
TYPE: NEUROPATHIC PAIN

## 2021-05-06 ASSESSMENT — LIFESTYLE VARIABLES: SUBSTANCE_ABUSE: 0

## 2021-05-06 NOTE — CARE PLAN
Problem: Safety  Goal: Will remain free from falls  Outcome: PROGRESSING AS EXPECTED  Intervention: Implement fall precautions  Note: Fall precautions in place. Call light and personal belongings by bedside.      Problem: Pain Management  Goal: Pain level will decrease to patient's comfort goal  Outcome: PROGRESSING AS EXPECTED  Intervention: Follow pain managment plan developed in collaboration with patient and Interdisciplinary Team  Note: Pt assessed for pain regularly and medicated PRN per MAR.

## 2021-05-06 NOTE — PROGRESS NOTES
Assumed care of pt at beginning of shift. Pt is A&Ox3, disoriented to event. Pt c/o pain 10/10 in his bilateral hands and feet, pt medicated with PRN Tramadol per MAR and given a hot pack. Call light and belongings within reach.

## 2021-05-06 NOTE — PROGRESS NOTES
Assumed care of pt at shift change. Pt is on RA with no signs of acute distress. A&Ox3 disoriented to situation. Medicated with PRN Tramadol for pain. POC discussed with patient. All comfort measures in place. Call light and personal belongings by bedside. Bed locked and in lowest position. Hourly rounding in place.

## 2021-05-06 NOTE — THERAPY
"Speech Language Pathology  Daily Treatment     Patient Name: Dinah Mathur  Age:  65 y.o., Sex:  male  Medical Record #: [id number]  Today's Date: 5/6/2021     Precautions  Precautions: Fall Risk  Comments: BLE pain    Assessment    Pt was seen for cognitive-linguistic tx with focus on memory, new learning and problem solving in the context of medication management. Pt was able to recall 5/9 medications he currently takes based on their purpose (i.e., not the generic/brand name). Pt declined to write today d/t impaired handwriting, stating \"I can barely sign my name.\" SLP provided simple, organized medication list to serve as an external memory aid to assist in learning and recall of medications. Pt was able to use this memory aid to determine which medications and # of pills he would take at a given time with 80% accuracy given min cues to improve to 100% accuracy. Pt reports his family fills his pillbox at home. Anticipate pt will need supervision and assistance w/ IADLs indefinitely due to cognitive impairments with baseline dependence for medication management, in the setting of progressive neurologic disease. Recommend 24/7 supervision in the home setting.     Plan    Continue current treatment plan.    Discharge Recommendations: Recommend post-acute placement for additional speech therapy services prior to discharge home    Subjective    \"My memory has gotten worse, yes.\"  \"I can barely sign my name.\"     Objective       05/06/21 1440   Cognitive-Linguistic   Short Term Memory Moderate (3)   Simple Reasoning / Problem Solving Moderate (3)   Medication Management  Moderate (3)   Short Term Goals   Short Term Goal # 1 Pt will complete simple problem solving tasks with 90% accuracy and min cues   Goal Outcome # 1 Progressing as expected   Short Term Goal # 2 Pt will complete short term memory tasks with 80% accuracy and mod cues   Goal Outcome # 2  Progressing as expected   Short Term Goal # 3 Patient will " complete executive functioning tasks related to ALDs with 80% accuracy and min cues   Goal Outcome  # 3   (Goal not targeted; pt declining to write d/t impaired handwr)

## 2021-05-06 NOTE — CARE PLAN
Problem: Safety  Goal: Will remain free from falls  Outcome: PROGRESSING AS EXPECTED  Note: Pt is a moderate fall risk but alert and oriented and calls appropriately for assistance with mobilization.      Problem: Pain Management  Goal: Pain level will decrease to patient's comfort goal  Outcome: PROGRESSING SLOWER THAN EXPECTED  Note: Pt reported 10/10 neuropathic pain in his bilateral hands and feet. Pt medicated with scheduled Tylenol and PRN Ibuprofen and given hot packs but reported little relief. Offered PRN Icy hot, pt denied.

## 2021-05-06 NOTE — PROGRESS NOTES
Assessment/description of ears? Dry, flaky  Which preventative measures are in place for the ears? Lotion applied, grey foam pads on glasses, pt educated to take off glasses when going to bed.     Assessment/description of elbows? Pink and blanching  Which preventative measures are in place for the elbows? Pt is able to make frequent turns and encouraged to do so.     Assessment/description of sacrum?Pink and blanching  Which preventative measures are in place for the sacrum?Pt is able to make frequent turns and encouraged to do so.     Assessment/description of heels?  Pink and blanching  Which preventative measures are in place for the heels? Pt is able to make frequent turns and encouraged to do so.      Which devices are in place?PIV, glasses  Description of skin under devices: intact  Which preventative measures are in place under devices?Grey foam pads for glasses.     Other:

## 2021-05-06 NOTE — DISCHARGE PLANNING
Medical Social Work    Anticipated Discharge Disposition: Skilled Nursing Facility (SNF)    Action: Pt has been accepted to [facility] and insurance authorization was submitted to pt's insurance on 4/29. Pt continues to wait for insurance authorization for SNF.        LSW contacted [facility] to follow up on pt's group home waiver application that was submitted on 4/28.  LSW was notified that case has been assigned to , Luba Ba, and LSW was able to speak w/ Luba regarding pt's case.  Luba will reach out to family to assist w/ group home waiver and group home placement.  LSW informed Luba that pt awaits transfer to SNF and then will return home w/ brother who will need assistance w/ finding a group home for the pt.       Barriers to Discharge: Insurance authorization for SNF.    Plan:  LSW escalated this case to [facility] for follow-up on insurance authorization as pt is medically clear to dc.

## 2021-05-06 NOTE — PROGRESS NOTES
Daily Progress Note:     Date of Service: 5/6/2021  Primary Team: [employer] IM Yellow Team   Attending: Opal Zapien M.D.   Senior Resident: Navya Mccormick M.D.  Contact:  [phone]    ID:   65-year-old male with a history of chronic leg swelling as well as bilateral hand pain who is presenting with bilateral lower extremity edema.     Interval Update:  Late yesterday the patient was refusing gabapentin because he states that he only takes 100 mg at home and did not want to take the 300 mg here.  However, overnight the patient was complaining of increasing burning in his hands and feet.  Once again I explained to him that the gabapentin dose was increased to help with the burning and that increasing the dose will help with the pain.  He was now agreeable to increasing the dose back to 300 twice a day.  We also added on amitriptyline at nighttime to help with some of the pain.     In regards to placement, the patient has been accepted at [facility] on 4/29.  However, his insurance has not approved it yet.  This morning I discussed with case management and [place] as well as  has been calling the insurance company twice daily.    Consultants/Specialty:  None    Review of Systems:    Review of Systems   Constitutional: Negative for chills and fever.   HENT: Negative for congestion and sore throat.    Respiratory: Negative for cough and shortness of breath.    Cardiovascular: Negative for chest pain and palpitations.   Gastrointestinal: Negative for nausea and vomiting.   Genitourinary: Negative for dysuria and urgency.   Musculoskeletal: Positive for joint pain. Negative for falls.   Skin: Negative for itching and rash.   Neurological: Negative for weakness and headaches.   Psychiatric/Behavioral: Negative for substance abuse. The patient does not have insomnia.        Objective Data:   Physical Exam:   Vitals:   Temp:  [36.1 °C (96.9 °F)-36.9 °C (98.4 °F)] 36.8 °C (98.3 °F)  Pulse:  [57-86] 62  Resp:  [17-18] 18  BP:  (110-129)/(57-69) 121/61  SpO2:  [93 %-96 %] 93 %     Physical Exam  Vitals and nursing note reviewed.   Constitutional:       General: He is not in acute distress.     Appearance: He is not toxic-appearing.   HENT:      Head: Normocephalic and atraumatic.   Eyes:      General: No scleral icterus.     Extraocular Movements: Extraocular movements intact.      Pupils: Pupils are equal, round, and reactive to light.   Cardiovascular:      Rate and Rhythm: Normal rate and regular rhythm.      Heart sounds: No murmur. No gallop.    Pulmonary:      Effort: Pulmonary effort is normal. No respiratory distress.      Breath sounds: Normal breath sounds.   Abdominal:      General: Abdomen is flat. There is no distension.      Palpations: Abdomen is soft.      Tenderness: There is no abdominal tenderness.   Musculoskeletal:      Right lower leg: No edema.      Left lower leg: No edema.      Comments: Trigger finger of the left right finger.    Skin:     General: Skin is warm and dry.      Capillary Refill: Capillary refill takes less than 2 seconds.      Comments: No open wounds, redness in bilateral lower extremities.     Neurological:      General: No focal deficit present.      Mental Status: He is alert.   Psychiatric:         Mood and Affect: Mood normal.         Behavior: Behavior normal.       Labs:   No recent lab work.    Imaging:   No recent imaging    Problem Representation:   The patient is a 65-year-old male with a past medical history of diabetes (last A1c 6.8), Parkinson's (diagnosed at an outside hospital a few months ago on Sinemet), bilateral hand arthritis, and chronic lower extremity edema.  He presented to the hospital for hand pain, bilateral lower extremity edema, and failure to thrive.    * Failure to thrive in adult  Assessment & Plan  The patient is presenting with failure to thrive.  As per the family, the patient has some difficulty at home.  They are concerned that he may be having some  neurocognitive issues at this time.    Plan:  -PT recommended SNF  -Neurocognitive eval recommends cognitive therapy at SNF. Working on finding a SNF that can accept him as well as a potential group home to take him after SNF stay.   -Social work is working with the family. His plan after he is discharged from SNF is to go back home with the brother until outpatient SW can place him in a group home.     Venous stasis dermatitis of both lower extremities  Assessment & Plan  The rash that the patient has is likely venous stasis dermatitis in his bilateral lower extremities.  Edema is likely related to venous insufficiency as well.  The patient had a negative BNP, negative DVT ultrasound, strong pulses on exam, negative ESR and CRP, and as per the patient this has been chronic and ongoing for years.  His edema had improved this morning as well after he had been laying down all night.  This makes it much less likely to be heart failure and more likely to be related to venous stasis.    Plan:  -Continue compression stockings and elevation      Parkinson disease ([facility])  Assessment & Plan  Per the patient, he was diagnosed with Parkinson's disease in [place] a few months back.  The patient is on Sinemet currently 4 times a day.    Plan:  -Continue Sinemet.  -The patient will need to be followed as an outpatient by his primary care provider for further work-up of this.    Osteoarthritis of both hands  Assessment & Plan  The patient has a history of chronic bilateral hand pain.  He worked as a  for a while.  His x-rays are consistent with osteoarthritis.  ESR and CRP were negative.    Plan:  -Added IcyHot for arthritic pain  -Continue scheduled Tylenol every 6 hours  -Added tramadol as needed for pain  -As needed ibuprofen for pain  -Continue gabapentin  -Added amitriptyline  -Patient was also on meloxicam at home.  However, we will just use tramadol inpatient for now.    DM (diabetes mellitus) ([facility])  Assessment &  Plan  The patient has a history of diabetes mellitus on Metformin.  Last A1c was this admission and it was 6.8.    Plan:  - Accuchecks have been fine. Will discontinue      DVT ppx: Lovenox  Diet: Diabetic  Tubes/Lines: PIV  Code status: FULL    Navya Mccormick M.D.

## 2021-05-07 PROCEDURE — 700102 HCHG RX REV CODE 250 W/ 637 OVERRIDE(OP): Performed by: HOSPITALIST

## 2021-05-07 PROCEDURE — A9270 NON-COVERED ITEM OR SERVICE: HCPCS | Performed by: HOSPITALIST

## 2021-05-07 PROCEDURE — 700102 HCHG RX REV CODE 250 W/ 637 OVERRIDE(OP): Performed by: STUDENT IN AN ORGANIZED HEALTH CARE EDUCATION/TRAINING PROGRAM

## 2021-05-07 PROCEDURE — A9270 NON-COVERED ITEM OR SERVICE: HCPCS | Performed by: STUDENT IN AN ORGANIZED HEALTH CARE EDUCATION/TRAINING PROGRAM

## 2021-05-07 PROCEDURE — G0378 HOSPITAL OBSERVATION PER HR: HCPCS

## 2021-05-07 PROCEDURE — 99224 PR SUBSEQUENT OBSERVATION CARE,LEVEL I: CPT | Mod: GC | Performed by: HOSPITALIST

## 2021-05-07 RX ADMIN — ACETAMINOPHEN 650 MG: 325 TABLET, FILM COATED ORAL at 22:30

## 2021-05-07 RX ADMIN — DOCUSATE SODIUM 50 MG AND SENNOSIDES 8.6 MG 2 TABLET: 8.6; 5 TABLET, FILM COATED ORAL at 06:14

## 2021-05-07 RX ADMIN — ROSUVASTATIN CALCIUM 40 MG: 20 TABLET, FILM COATED ORAL at 06:14

## 2021-05-07 RX ADMIN — CARBIDOPA AND LEVODOPA 1 TABLET: 25; 250 TABLET ORAL at 17:00

## 2021-05-07 RX ADMIN — CARBIDOPA AND LEVODOPA 1 TABLET: 25; 250 TABLET ORAL at 11:40

## 2021-05-07 RX ADMIN — ACETAMINOPHEN 650 MG: 325 TABLET, FILM COATED ORAL at 17:12

## 2021-05-07 RX ADMIN — GABAPENTIN 300 MG: 300 CAPSULE ORAL at 06:14

## 2021-05-07 RX ADMIN — CARBIDOPA AND LEVODOPA 1 TABLET: 25; 250 TABLET ORAL at 06:18

## 2021-05-07 RX ADMIN — AMITRIPTYLINE HYDROCHLORIDE 25 MG: 25 TABLET, FILM COATED ORAL at 17:11

## 2021-05-07 RX ADMIN — ACETAMINOPHEN 650 MG: 325 TABLET, FILM COATED ORAL at 06:14

## 2021-05-07 RX ADMIN — TAMSULOSIN HYDROCHLORIDE 0.4 MG: 0.4 CAPSULE ORAL at 06:14

## 2021-05-07 RX ADMIN — GABAPENTIN 300 MG: 300 CAPSULE ORAL at 17:11

## 2021-05-07 RX ADMIN — CARBIDOPA AND LEVODOPA 1 TABLET: 25; 250 TABLET ORAL at 22:30

## 2021-05-07 RX ADMIN — ASPIRIN 81 MG: 81 TABLET, COATED ORAL at 06:14

## 2021-05-07 RX ADMIN — DOCUSATE SODIUM 50 MG AND SENNOSIDES 8.6 MG 2 TABLET: 8.6; 5 TABLET, FILM COATED ORAL at 17:11

## 2021-05-07 RX ADMIN — ACETAMINOPHEN 650 MG: 325 TABLET, FILM COATED ORAL at 11:40

## 2021-05-07 ASSESSMENT — PAIN DESCRIPTION - PAIN TYPE
TYPE: NEUROPATHIC PAIN

## 2021-05-07 ASSESSMENT — ENCOUNTER SYMPTOMS
HEADACHES: 0
INSOMNIA: 0
WEAKNESS: 0
SORE THROAT: 0
CHILLS: 0
FALLS: 0
COUGH: 0
NAUSEA: 0
SHORTNESS OF BREATH: 0
PALPITATIONS: 0
FEVER: 0
VOMITING: 0

## 2021-05-07 ASSESSMENT — LIFESTYLE VARIABLES: SUBSTANCE_ABUSE: 0

## 2021-05-07 NOTE — PROGRESS NOTES
Received bedside report and assumed care of pt at change of shift.Pt is resting in bed at this time, VSS on RA. Pt states pain is at 2/10 pain and would like to sleep before breakfast. Updated on POC. Bed is locked and in lowest position with water and call light in reach. Bed alarm is on with walker out of reach.

## 2021-05-07 NOTE — PROGRESS NOTES
Assessment/description of ears? Dry, flaky  Which preventative measures are in place for the ears? Grey foam pads on glasses, pt educated to take off glasses when going to bed.      Assessment/description of elbows? Pink and blanching  Which preventative measures are in place for the elbows? Pt is able to make frequent turns and encouraged to do so.      Assessment/description of sacrum?Pink and blanching  Which preventative measures are in place for the sacrum?Pt is able to make frequent turns and encouraged to do so.      Assessment/description of heels?  Pink and blanching  Which preventative measures are in place for the heels? Pt is able to make frequent turns and encouraged to do so.      Which devices are in place? PIV, glasses  Description of skin under devices: intact  Which preventative measures are in place under devices?Grey foam pads for glasses.      Other:

## 2021-05-07 NOTE — CARE PLAN
Problem: Discharge Barriers/Planning  Goal: Patient's continuum of care needs will be met  Outcome: PROGRESSING AS EXPECTED  Note: Discussed and reinforced that pt has been accepted to a SNF and we ar no waiting for his insurance to approve this transition. Communicated with brother and reinforced discharge plans      Problem: Pain Management  Goal: Pain level will decrease to patient's comfort goal  Outcome: PROGRESSING AS EXPECTED  Note: Reinforced education regarding pain management and increased dosing of gabapentin for the type of pain he gets in legs and hands.

## 2021-05-07 NOTE — PROGRESS NOTES
Daily Progress Note:     Date of Service: 5/7/2021  Primary Team: [employer] IM Yellow Team   Attending: ZEV Regan M.D.   Senior Resident: Nayva Mccormick M.D.  Contact:  [phone]    ID:   65-year-old male with a history of chronic leg swelling as well as bilateral hand pain who is presenting with bilateral lower extremity edema.     Interval Update:  The patient was doing well this morning.  No overnight events.  Still awaiting placement to SNF.  [facility] has accepted him.  However, his insurance has not authorized this yet.    Consultants/Specialty:   None    Review of Systems:    Review of Systems   Constitutional: Negative for chills and fever.   HENT: Negative for congestion and sore throat.    Respiratory: Negative for cough and shortness of breath.    Cardiovascular: Negative for chest pain and palpitations.   Gastrointestinal: Negative for nausea and vomiting.   Genitourinary: Negative for dysuria and urgency.   Musculoskeletal: Positive for joint pain. Negative for falls.   Skin: Negative for itching and rash.   Neurological: Negative for weakness and headaches.   Psychiatric/Behavioral: Negative for substance abuse. The patient does not have insomnia.        Objective Data:   Physical Exam:   Vitals:   Temp:  [35.9 °C (96.6 °F)-37.1 °C (98.7 °F)] 37.1 °C (98.7 °F)  Pulse:  [52-61] 60  Resp:  [16-18] 18  BP: (100-122)/(52-72) 110/72  SpO2:  [94 %-95 %] 95 %     Physical Exam  Vitals and nursing note reviewed.   Constitutional:       General: He is not in acute distress.     Appearance: He is not toxic-appearing.   HENT:      Head: Normocephalic and atraumatic.   Eyes:      General: No scleral icterus.     Extraocular Movements: Extraocular movements intact.      Pupils: Pupils are equal, round, and reactive to light.   Cardiovascular:      Rate and Rhythm: Normal rate and regular rhythm.      Heart sounds: No murmur. No gallop.    Pulmonary:      Effort: Pulmonary effort is normal. No respiratory  distress.      Breath sounds: Normal breath sounds.   Abdominal:      General: Abdomen is flat. There is no distension.      Palpations: Abdomen is soft.      Tenderness: There is no abdominal tenderness.   Musculoskeletal:      Right lower leg: No edema.      Left lower leg: No edema.      Comments: Trigger finger of the left right finger.    Skin:     General: Skin is warm and dry.      Capillary Refill: Capillary refill takes less than 2 seconds.      Comments: No open wounds, redness in bilateral lower extremities.     Neurological:      General: No focal deficit present.      Mental Status: He is alert.   Psychiatric:         Mood and Affect: Mood normal.         Behavior: Behavior normal.       Labs:   No recent lab work.    Imaging:   No recent imaging    Problem Representation:   The patient is a 65-year-old male with a past medical history of diabetes (last A1c 6.8), Parkinson's (diagnosed at an outside hospital a few months ago on Sinemet), bilateral hand arthritis, and chronic lower extremity edema.  He presented to the hospital for hand pain, bilateral lower extremity edema, and failure to thrive.    * Failure to thrive in adult  Assessment & Plan  The patient is presenting with failure to thrive.  As per the family, the patient has some difficulty at home.  They are concerned that he may be having some neurocognitive issues at this time.    Plan:  -PT recommended SNF  -Neurocognitive eval recommends cognitive therapy at SNF. Working on finding a SNF that can accept him as well as a potential group home to take him after SNF stay.   -Social work is working with the family. His plan after he is discharged from SNF is to go back home with the brother until outpatient SW can place him in a group home.     Venous stasis dermatitis of both lower extremities  Assessment & Plan  The rash that the patient has is likely venous stasis dermatitis in his bilateral lower extremities.  Edema is likely related to  venous insufficiency as well.  The patient had a negative BNP, negative DVT ultrasound, strong pulses on exam, negative ESR and CRP, and as per the patient this has been chronic and ongoing for years.  His edema had improved this morning as well after he had been laying down all night.  This makes it much less likely to be heart failure and more likely to be related to venous stasis.    Plan:  -Continue compression stockings and elevation      Parkinson disease ([facility])  Assessment & Plan  Per the patient, he was diagnosed with Parkinson's disease in [place] a few months back.  The patient is on Sinemet currently 4 times a day.    Plan:  -Continue Sinemet.  -The patient will need to be followed as an outpatient by his primary care provider for further work-up of this.    Osteoarthritis of both hands  Assessment & Plan  The patient has a history of chronic bilateral hand pain.  He worked as a  for a while.  His x-rays are consistent with osteoarthritis.  ESR and CRP were negative.    Plan:  -Added IcyHot for arthritic pain  -Continue scheduled Tylenol every 6 hours  -Added tramadol as needed for pain  -As needed ibuprofen for pain  -Continue gabapentin  -Added amitriptyline  -Patient was also on meloxicam at home.  However, we will just use tramadol inpatient for now.    DM (diabetes mellitus) ([facility])  Assessment & Plan  The patient has a history of diabetes mellitus on Metformin.  Last A1c was this admission and it was 6.8.    Plan:  - Accuchecks have been fine. Will discontinue      DVT ppx: Lovenox  Diet: Diabetic  Tubes/Lines: PIV  Code status: FULL    Navya Mccormick M.D.

## 2021-05-07 NOTE — PROGRESS NOTES
A&Ox4. C/o pain in bilateral hands and feet, medicated per MAR. Reinforced fall education. Fall precautions in place. Pt able to make needs known.

## 2021-05-08 PROCEDURE — 700102 HCHG RX REV CODE 250 W/ 637 OVERRIDE(OP): Performed by: INTERNAL MEDICINE

## 2021-05-08 PROCEDURE — A9270 NON-COVERED ITEM OR SERVICE: HCPCS | Performed by: HOSPITALIST

## 2021-05-08 PROCEDURE — 96372 THER/PROPH/DIAG INJ SC/IM: CPT

## 2021-05-08 PROCEDURE — A9270 NON-COVERED ITEM OR SERVICE: HCPCS | Performed by: STUDENT IN AN ORGANIZED HEALTH CARE EDUCATION/TRAINING PROGRAM

## 2021-05-08 PROCEDURE — 700102 HCHG RX REV CODE 250 W/ 637 OVERRIDE(OP): Performed by: STUDENT IN AN ORGANIZED HEALTH CARE EDUCATION/TRAINING PROGRAM

## 2021-05-08 PROCEDURE — G0378 HOSPITAL OBSERVATION PER HR: HCPCS

## 2021-05-08 PROCEDURE — A9270 NON-COVERED ITEM OR SERVICE: HCPCS | Performed by: INTERNAL MEDICINE

## 2021-05-08 PROCEDURE — 700111 HCHG RX REV CODE 636 W/ 250 OVERRIDE (IP): Performed by: STUDENT IN AN ORGANIZED HEALTH CARE EDUCATION/TRAINING PROGRAM

## 2021-05-08 PROCEDURE — 700102 HCHG RX REV CODE 250 W/ 637 OVERRIDE(OP): Performed by: HOSPITALIST

## 2021-05-08 PROCEDURE — 99224 PR SUBSEQUENT OBSERVATION CARE,LEVEL I: CPT | Mod: GC | Performed by: HOSPITALIST

## 2021-05-08 RX ADMIN — CARBIDOPA AND LEVODOPA 1 TABLET: 25; 250 TABLET ORAL at 04:25

## 2021-05-08 RX ADMIN — GABAPENTIN 300 MG: 300 CAPSULE ORAL at 17:19

## 2021-05-08 RX ADMIN — CARBIDOPA AND LEVODOPA 1 TABLET: 25; 250 TABLET ORAL at 22:32

## 2021-05-08 RX ADMIN — ROSUVASTATIN CALCIUM 40 MG: 20 TABLET, FILM COATED ORAL at 04:26

## 2021-05-08 RX ADMIN — DOCUSATE SODIUM 50 MG AND SENNOSIDES 8.6 MG 2 TABLET: 8.6; 5 TABLET, FILM COATED ORAL at 17:19

## 2021-05-08 RX ADMIN — CARBIDOPA AND LEVODOPA 1 TABLET: 25; 250 TABLET ORAL at 11:17

## 2021-05-08 RX ADMIN — ACETAMINOPHEN 650 MG: 325 TABLET, FILM COATED ORAL at 17:19

## 2021-05-08 RX ADMIN — AMITRIPTYLINE HYDROCHLORIDE 25 MG: 25 TABLET, FILM COATED ORAL at 17:19

## 2021-05-08 RX ADMIN — GABAPENTIN 300 MG: 300 CAPSULE ORAL at 04:26

## 2021-05-08 RX ADMIN — TRAMADOL HYDROCHLORIDE 50 MG: 50 TABLET ORAL at 19:17

## 2021-05-08 RX ADMIN — ACETAMINOPHEN 650 MG: 325 TABLET, FILM COATED ORAL at 04:25

## 2021-05-08 RX ADMIN — ENOXAPARIN SODIUM 40 MG: 40 INJECTION SUBCUTANEOUS at 04:26

## 2021-05-08 RX ADMIN — TAMSULOSIN HYDROCHLORIDE 0.4 MG: 0.4 CAPSULE ORAL at 04:25

## 2021-05-08 RX ADMIN — ACETAMINOPHEN 650 MG: 325 TABLET, FILM COATED ORAL at 11:17

## 2021-05-08 RX ADMIN — CARBIDOPA AND LEVODOPA 1 TABLET: 25; 250 TABLET ORAL at 17:19

## 2021-05-08 RX ADMIN — DOCUSATE SODIUM 50 MG AND SENNOSIDES 8.6 MG 2 TABLET: 8.6; 5 TABLET, FILM COATED ORAL at 04:25

## 2021-05-08 RX ADMIN — ASPIRIN 81 MG: 81 TABLET, COATED ORAL at 04:25

## 2021-05-08 RX ADMIN — IBUPROFEN 400 MG: 400 TABLET, FILM COATED ORAL at 22:32

## 2021-05-08 ASSESSMENT — ENCOUNTER SYMPTOMS
WEAKNESS: 0
FEVER: 0
HEADACHES: 0
VOMITING: 0
SORE THROAT: 0
FALLS: 0
SHORTNESS OF BREATH: 0
PALPITATIONS: 0
NAUSEA: 0
CHILLS: 0
COUGH: 0
INSOMNIA: 0

## 2021-05-08 ASSESSMENT — PAIN DESCRIPTION - PAIN TYPE
TYPE: NEUROPATHIC PAIN
TYPE: ACUTE PAIN

## 2021-05-08 ASSESSMENT — LIFESTYLE VARIABLES: SUBSTANCE_ABUSE: 0

## 2021-05-08 NOTE — PROGRESS NOTES
Report received by dayshift RN. Assumed care of pt. Assessment complete. Pt A&Ox4, VSS and on RA. Pt reports 4/10 BLE pain, medicated with scheduled tylenol. All needs met at this time. Call light within reach, bed in lowest position, and pt has no further questions at this time.

## 2021-05-08 NOTE — PROGRESS NOTES
Assessment/description of ears? Dry, flaky.  Which preventative measures are in place for the ears? Grey foam pads on glasses, pt educated to take off glasses when going to bed. Pt verbalizes understanding.      Assessment/description of elbows? Pink and blanching  Which preventative measures are in place for the elbows? Pt is able to make frequent turns and encouraged to do so.      Assessment/description of sacrum?Pink and blanching  Which preventative measures are in place for the sacrum? Pt is able to make frequent turns in bed and encouraged to do so.      Assessment/description of heels?  Pink and blanching  Which preventative measures are in place for the heels? Pt is able to make frequent turns and encouraged to do so.      Which devices are in place? PIV, glasses  Description of skin under devices: intact  Which preventative measures are in place under devices? Grey foam pads for glasses.

## 2021-05-08 NOTE — CARE PLAN
Problem: Safety  Goal: Will remain free from injury  Outcome: PROGRESSING AS EXPECTED   Fall precautions in place. Treaded socks on pt. Bedrails up. Bed in lowest position and locked. Call light and phone within reach. Patient educated on importance of calling nurses before getting out of bed, verbalizes understanding. Bed alarm on.     Problem: Knowledge Deficit  Goal: Knowledge of disease process/condition, treatment plan, diagnostic tests, and medications will improve  Outcome: PROGRESSING AS EXPECTED   Patient educated about POC.  All questions answered in regards to disease process, treatment, medications and diet.  Patient verbalized understanding and voiced no further questions at this time.

## 2021-05-08 NOTE — DISCHARGE PLANNING
Agency/Facility Name: [facility]  Outcome: left vmail regarding status of referral    Agency/Facility Name: [facility]  Outcome: Left vmail regarding referral

## 2021-05-08 NOTE — PROGRESS NOTES
Received bedside report and accepted care of patient.    Pt is currently A/ox4, room air with no visible or stated sign of distress, discomfort, or SOB. Pt is currently a diabetic diet, tolerating diet well. Pt is continent of both, utilizes a urinal to void. Pt has one PIV, saline locked at this time. Pt call appropriately. Pt is a standby assist with a FWW.    Patient currently resting in bed in no visible or stated signs of distress. Bed alarm in place, controls on and bed in locked and lowest position. Call light and personal possessions within reach. Patient educated about use of call light and verbalized understanding.

## 2021-05-08 NOTE — CARE PLAN
Problem: Safety  Goal: Will remain free from falls  Outcome: PROGRESSING AS EXPECTED  Intervention: Assess risk factors for falls  Flowsheets (Taken 5/8/2021 0929)  Pt Calls for Assistance: Yes  Fall Risk: High Risk to Fall - 2 or more points   Mobility Status Assessment: 1-1 Healthcare Provider Required for Assistance with Ambulation & Transfer  Risk for Injury-Any positive answers results in the pt being at high risk for fall related injury: Not Applicable  Intervention: Implement fall precautions  Flowsheets (Taken 5/8/2021 0929)  Bed Alarm: Yes - Alarm On  Environmental Precautions:   Treaded Slipper Socks on Patient   Personal Belongings, Wastebasket, Call Bell etc. in Easy Reach   Transferred to Stronger Side   Report Given to Other Health Care Providers Regarding Fall Risk   Bed in Low Position   Communication Sign for Patients & Families   Mobility Assessed & Appropriate Sign Placed     Problem: Knowledge Deficit  Goal: Knowledge of disease process/condition, treatment plan, diagnostic tests, and medications will improve  Outcome: PROGRESSING AS EXPECTED  Intervention: Explain information regarding disease process/condition, treatment plan, diagnostic tests, and medications and document in education  Note: Pt educated regarding plan of care and medications. All questions answered.

## 2021-05-08 NOTE — PROGRESS NOTES
Daily Progress Note:     Date of Service: 5/8/2021  Primary Team: [facility] IM Yellow Team   Attending: ZEV Regan M.D.   Senior Resident: Justin Warren M.D.  Contact:  [phone]    ID:   65-year-old male with a history of chronic leg swelling as well as bilateral hand pain who is presenting with bilateral lower extremity edema.     Interval Update:  The patient was doing well this morning, no acute complaints. Sleeping better with elavil.  No overnight events.  Still awaiting placement to SNF.  [facility] accepted him.  However, his insurance has not authorized this yet.    Consultants/Specialty:   None    Review of Systems:    Review of Systems   Constitutional: Negative for chills and fever.   HENT: Negative for congestion and sore throat.    Respiratory: Negative for cough and shortness of breath.    Cardiovascular: Negative for chest pain and palpitations.   Gastrointestinal: Negative for nausea and vomiting.   Genitourinary: Negative for dysuria and urgency.   Musculoskeletal: Positive for joint pain. Negative for falls.   Skin: Negative for itching and rash.   Neurological: Negative for weakness and headaches.   Psychiatric/Behavioral: Negative for substance abuse. The patient does not have insomnia.        Objective Data:   Physical Exam:   Vitals:   Temp:  [36.3 °C (97.3 °F)-36.9 °C (98.5 °F)] 36.3 °C (97.3 °F)  Pulse:  [51-76] 76  Resp:  [16-17] 16  BP: (114-116)/(57-62) 116/57  SpO2:  [91 %-96 %] 91 %     Physical Exam  Vitals and nursing note reviewed.   Constitutional:       General: He is not in acute distress.     Appearance: He is not toxic-appearing.   HENT:      Head: Normocephalic and atraumatic.   Eyes:      General: No scleral icterus.     Extraocular Movements: Extraocular movements intact.      Pupils: Pupils are equal, round, and reactive to light.   Cardiovascular:      Rate and Rhythm: Normal rate and regular rhythm.      Heart sounds: No murmur. No gallop.    Pulmonary:       Effort: Pulmonary effort is normal. No respiratory distress.      Breath sounds: Normal breath sounds.   Abdominal:      General: Abdomen is flat. There is no distension.      Palpations: Abdomen is soft.      Tenderness: There is no abdominal tenderness.   Musculoskeletal:      Right lower leg: No edema.      Left lower leg: No edema.      Comments: Trigger finger of the left right finger.    Skin:     General: Skin is warm and dry.      Capillary Refill: Capillary refill takes less than 2 seconds.      Comments: No open wounds, redness in bilateral lower extremities.     Neurological:      General: No focal deficit present.      Mental Status: He is alert.   Psychiatric:         Mood and Affect: Mood normal.         Behavior: Behavior normal.       Labs:   No recent lab work.    Imaging:   No recent imaging    Problem Representation:   The patient is a 65-year-old male with a past medical history of diabetes (last A1c 6.8), Parkinson's (diagnosed at an outside hospital a few months ago on Sinemet), bilateral hand arthritis, and chronic lower extremity edema.  He presented to the hospital for hand pain, bilateral lower extremity edema, and failure to thrive.    * Failure to thrive in adult  Assessment & Plan  The patient is presenting with failure to thrive.  As per the family, the patient has some difficulty at home.  They are concerned that he may be having some neurocognitive issues at this time.    Plan:  -PT recommended SNF  -Neurocognitive eval recommends cognitive therapy at SNF. Working on finding a SNF that can accept him as well as a potential group home to take him after SNF stay.   -Social work is working with the family. His plan after he is discharged from SNF is to go back home with the brother until outpatient SW can place him in a group home.     Venous stasis dermatitis of both lower extremities  Assessment & Plan  The rash that the patient has is likely venous stasis dermatitis in his bilateral  lower extremities.  Edema is likely related to venous insufficiency as well.  The patient had a negative BNP, negative DVT ultrasound, strong pulses on exam, negative ESR and CRP, and as per the patient this has been chronic and ongoing for years.  His edema had improved this morning as well after he had been laying down all night.  This makes it much less likely to be heart failure and more likely to be related to venous stasis.    Plan:  -Continue compression stockings and elevation      Parkinson disease ([facility])  Assessment & Plan  Per the patient, he was diagnosed with Parkinson's disease in [place] a few months back.  The patient is on Sinemet currently 4 times a day.    Plan:  -Continue Sinemet.  -The patient will need to be followed as an outpatient by his primary care provider for further work-up of this.    Osteoarthritis of both hands  Assessment & Plan  The patient has a history of chronic bilateral hand pain.  He worked as a  for a while.  His x-rays are consistent with osteoarthritis.  ESR and CRP were negative.    Plan:  -Added IcyHot for arthritic pain  -Continue scheduled Tylenol every 6 hours  -Added tramadol as needed for pain  -As needed ibuprofen for pain  -Continue gabapentin  -Added amitriptyline  -Patient was also on meloxicam at home.  However, we will just use tramadol inpatient for now.    DM (diabetes mellitus) ([facility])  Assessment & Plan  The patient has a history of diabetes mellitus on Metformin.  Last A1c was this admission and it was 6.8.    Plan:  - Accuchecks have been fine. Will discontinue      DVT ppx: Lovenox  Diet: Diabetic  Tubes/Lines: PIV  Code status: FULL    Justin Warren M.D.

## 2021-05-09 PROCEDURE — G0378 HOSPITAL OBSERVATION PER HR: HCPCS

## 2021-05-09 PROCEDURE — 700102 HCHG RX REV CODE 250 W/ 637 OVERRIDE(OP): Performed by: STUDENT IN AN ORGANIZED HEALTH CARE EDUCATION/TRAINING PROGRAM

## 2021-05-09 PROCEDURE — A9270 NON-COVERED ITEM OR SERVICE: HCPCS | Performed by: HOSPITALIST

## 2021-05-09 PROCEDURE — 700102 HCHG RX REV CODE 250 W/ 637 OVERRIDE(OP): Performed by: NURSE PRACTITIONER

## 2021-05-09 PROCEDURE — A9270 NON-COVERED ITEM OR SERVICE: HCPCS | Performed by: STUDENT IN AN ORGANIZED HEALTH CARE EDUCATION/TRAINING PROGRAM

## 2021-05-09 PROCEDURE — A9270 NON-COVERED ITEM OR SERVICE: HCPCS | Performed by: NURSE PRACTITIONER

## 2021-05-09 PROCEDURE — 99225 PR SUBSEQUENT OBSERVATION CARE,LEVEL II: CPT | Performed by: NURSE PRACTITIONER

## 2021-05-09 PROCEDURE — 700102 HCHG RX REV CODE 250 W/ 637 OVERRIDE(OP): Performed by: HOSPITALIST

## 2021-05-09 RX ORDER — ACETAMINOPHEN 325 MG/1
650 TABLET ORAL EVERY 6 HOURS PRN
Status: DISCONTINUED | OUTPATIENT
Start: 2021-05-09 | End: 2021-05-14 | Stop reason: HOSPADM

## 2021-05-09 RX ORDER — GABAPENTIN 300 MG/1
300 CAPSULE ORAL 3 TIMES DAILY
Status: DISCONTINUED | OUTPATIENT
Start: 2021-05-09 | End: 2021-05-14 | Stop reason: HOSPADM

## 2021-05-09 RX ORDER — OXYCODONE HYDROCHLORIDE 5 MG/1
2.5 TABLET ORAL EVERY 4 HOURS PRN
Status: DISCONTINUED | OUTPATIENT
Start: 2021-05-09 | End: 2021-05-14 | Stop reason: HOSPADM

## 2021-05-09 RX ORDER — TRAMADOL HYDROCHLORIDE 50 MG/1
50 TABLET ORAL EVERY 6 HOURS PRN
Status: DISCONTINUED | OUTPATIENT
Start: 2021-05-09 | End: 2021-05-14 | Stop reason: HOSPADM

## 2021-05-09 RX ORDER — IBUPROFEN 400 MG/1
400 TABLET ORAL EVERY 6 HOURS PRN
Status: DISCONTINUED | OUTPATIENT
Start: 2021-05-09 | End: 2021-05-14 | Stop reason: HOSPADM

## 2021-05-09 RX ADMIN — GABAPENTIN 300 MG: 300 CAPSULE ORAL at 05:34

## 2021-05-09 RX ADMIN — CARBIDOPA AND LEVODOPA 1 TABLET: 25; 250 TABLET ORAL at 05:35

## 2021-05-09 RX ADMIN — IBUPROFEN 400 MG: 400 TABLET, FILM COATED ORAL at 11:15

## 2021-05-09 RX ADMIN — AMITRIPTYLINE HYDROCHLORIDE 25 MG: 25 TABLET, FILM COATED ORAL at 17:13

## 2021-05-09 RX ADMIN — ROSUVASTATIN CALCIUM 40 MG: 20 TABLET, FILM COATED ORAL at 05:34

## 2021-05-09 RX ADMIN — CARBIDOPA AND LEVODOPA 1 TABLET: 25; 250 TABLET ORAL at 17:13

## 2021-05-09 RX ADMIN — CARBIDOPA AND LEVODOPA 1 TABLET: 25; 250 TABLET ORAL at 11:15

## 2021-05-09 RX ADMIN — ACETAMINOPHEN 650 MG: 325 TABLET, FILM COATED ORAL at 00:41

## 2021-05-09 RX ADMIN — TAMSULOSIN HYDROCHLORIDE 0.4 MG: 0.4 CAPSULE ORAL at 05:35

## 2021-05-09 RX ADMIN — IBUPROFEN 400 MG: 400 TABLET, FILM COATED ORAL at 17:13

## 2021-05-09 RX ADMIN — CARBIDOPA AND LEVODOPA 1 TABLET: 25; 250 TABLET ORAL at 21:20

## 2021-05-09 RX ADMIN — ASPIRIN 81 MG: 81 TABLET, COATED ORAL at 05:34

## 2021-05-09 RX ADMIN — IBUPROFEN 400 MG: 400 TABLET, FILM COATED ORAL at 05:35

## 2021-05-09 RX ADMIN — GABAPENTIN 300 MG: 300 CAPSULE ORAL at 17:13

## 2021-05-09 ASSESSMENT — ENCOUNTER SYMPTOMS
ABDOMINAL PAIN: 0
DIARRHEA: 0
VOMITING: 0
HEADACHES: 0
FOCAL WEAKNESS: 0
FEVER: 0
CONSTIPATION: 0
DEPRESSION: 0
SHORTNESS OF BREATH: 0
WEAKNESS: 1
SENSORY CHANGE: 0
NERVOUS/ANXIOUS: 0
SPEECH CHANGE: 0
NAUSEA: 0
WHEEZING: 0
DIZZINESS: 0
INSOMNIA: 0
COUGH: 0

## 2021-05-09 ASSESSMENT — PAIN DESCRIPTION - PAIN TYPE
TYPE: ACUTE PAIN
TYPE: ACUTE PAIN
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
## 2021-05-09 NOTE — PROGRESS NOTES
1 RN Skin Check     Assessment/description of ears?dry, flaky  Which preventative measures are in place for the ears? Gray foam pads on the glasses, pt educated on offloading    Assessment/description of elbows? Pink and blanching  Which preventative measures are in place for the elbows? Pt is able to turn on self    Assessment/description of sacrum? Pink, blanching  Which preventative measures are in place for the sacrum? Pt turns self    Assessment/description of heels? Pink, blanching  Which preventative measures are in place for the heels? Pt turns self    Which devices are in place? PIV, glasses  Description of skin under devices: intact  Which preventative measures are in place under devices? Gray foam padding on glasses

## 2021-05-09 NOTE — PROGRESS NOTES
The patient is a 65-year-old male with a history of chronic bilateral lower extremity edema as well as osteoarthritis of his hands and feet who presented with bilateral lower extremity edema found to be venous stasis dermatitis.    The patient was evaluated by PT/OT/SLP (cognitive evaluation), who recommended that the patient needs 24/7 care.  He is currently awaiting placement to SNF.  [facility] has accepted him, but his insurance has not authorized this yet.  The patient is also needing to be placed to a group home and is working with inpatient and outpatient social work for this.    This patient was signed out to APRN service who will be taking over starting today.    Navya Mccormick M.D.

## 2021-05-09 NOTE — PROGRESS NOTES
-Late Entry for 5/8/2021 0930-    Assessment/description of ears?dry, flaky  Which preventative measures are in place for the ears? Gray foam pads on the glasses, pt educated on offloading    Assessment/description of elbows? Pink and blanching  Which preventative measures are in place for the elbows? Pt is able to turn on self    Assessment/description of sacrum? Pink, blanching  Which preventative measures are in place for the sacrum? Pt turns self    Assessment/description of heels? Pink, blanching  Which preventative measures are in place for the heels? Pt turns self    Which devices are in place? PIV, glasses  Description of skin under devices: intact  Which preventative measures are in place under devices? Gray foam padding on glasses

## 2021-05-09 NOTE — CARE PLAN
Problem: Safety  Goal: Will remain free from falls  Outcome: PROGRESSING AS EXPECTED  Intervention: Assess risk factors for falls  Flowsheets (Taken 5/9/2021 1014)  Pt Calls for Assistance: Yes  Fall Risk: High Risk to Fall - 2 or more points   Mobility Status Assessment: 1-1 Healthcare Provider Required for Assistance with Ambulation & Transfer  Risk for Injury-Any positive answers results in the pt being at high risk for fall related injury: Not Applicable  Intervention: Implement fall precautions  Flowsheets (Taken 5/9/2021 1014)  Bed Alarm: Yes - Alarm On  Environmental Precautions:   Treaded Slipper Socks on Patient   Personal Belongings, Wastebasket, Call Bell etc. in Easy Reach   Transferred to Stronger Side   Report Given to Other Health Care Providers Regarding Fall Risk   Bed in Low Position   Communication Sign for Patients & Families   Mobility Assessed & Appropriate Sign Placed  Chair/Bed Strip Alarm: Yes - Alarm On     Problem: Discharge Barriers/Planning  Goal: Patient's continuum of care needs will be met  Outcome: PROGRESSING SLOWER THAN EXPECTED  Intervention: Assess potential discharge barriers on admission and throughout hospital stay  Flowsheets (Taken 5/9/2021 1014)  Does Admitting Nurse Feel This Could be a Complex Discharge?: Yes  Note: Pt currently pending insurance for placement.
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
## 2021-05-10 PROCEDURE — 700102 HCHG RX REV CODE 250 W/ 637 OVERRIDE(OP): Performed by: STUDENT IN AN ORGANIZED HEALTH CARE EDUCATION/TRAINING PROGRAM

## 2021-05-10 PROCEDURE — 700102 HCHG RX REV CODE 250 W/ 637 OVERRIDE(OP): Performed by: NURSE PRACTITIONER

## 2021-05-10 PROCEDURE — G0378 HOSPITAL OBSERVATION PER HR: HCPCS

## 2021-05-10 PROCEDURE — A9270 NON-COVERED ITEM OR SERVICE: HCPCS | Performed by: NURSE PRACTITIONER

## 2021-05-10 PROCEDURE — A9270 NON-COVERED ITEM OR SERVICE: HCPCS | Performed by: STUDENT IN AN ORGANIZED HEALTH CARE EDUCATION/TRAINING PROGRAM

## 2021-05-10 PROCEDURE — 700102 HCHG RX REV CODE 250 W/ 637 OVERRIDE(OP): Performed by: HOSPITALIST

## 2021-05-10 PROCEDURE — A9270 NON-COVERED ITEM OR SERVICE: HCPCS | Performed by: HOSPITALIST

## 2021-05-10 RX ADMIN — AMITRIPTYLINE HYDROCHLORIDE 25 MG: 25 TABLET, FILM COATED ORAL at 16:22

## 2021-05-10 RX ADMIN — GABAPENTIN 300 MG: 300 CAPSULE ORAL at 04:54

## 2021-05-10 RX ADMIN — IBUPROFEN 400 MG: 400 TABLET, FILM COATED ORAL at 12:02

## 2021-05-10 RX ADMIN — CARBIDOPA AND LEVODOPA 1 TABLET: 25; 250 TABLET ORAL at 11:00

## 2021-05-10 RX ADMIN — GABAPENTIN 300 MG: 300 CAPSULE ORAL at 12:02

## 2021-05-10 RX ADMIN — CARBIDOPA AND LEVODOPA 1 TABLET: 25; 250 TABLET ORAL at 22:01

## 2021-05-10 RX ADMIN — DOCUSATE SODIUM 50 MG AND SENNOSIDES 8.6 MG 2 TABLET: 8.6; 5 TABLET, FILM COATED ORAL at 16:22

## 2021-05-10 RX ADMIN — DOCUSATE SODIUM 50 MG AND SENNOSIDES 8.6 MG 2 TABLET: 8.6; 5 TABLET, FILM COATED ORAL at 04:54

## 2021-05-10 RX ADMIN — CARBIDOPA AND LEVODOPA 1 TABLET: 25; 250 TABLET ORAL at 16:22

## 2021-05-10 RX ADMIN — ROSUVASTATIN CALCIUM 40 MG: 20 TABLET, FILM COATED ORAL at 04:54

## 2021-05-10 RX ADMIN — GABAPENTIN 300 MG: 300 CAPSULE ORAL at 16:22

## 2021-05-10 RX ADMIN — TAMSULOSIN HYDROCHLORIDE 0.4 MG: 0.4 CAPSULE ORAL at 04:54

## 2021-05-10 RX ADMIN — ASPIRIN 81 MG: 81 TABLET, COATED ORAL at 04:54

## 2021-05-10 RX ADMIN — IBUPROFEN 400 MG: 400 TABLET, FILM COATED ORAL at 22:01

## 2021-05-10 RX ADMIN — CARBIDOPA AND LEVODOPA 1 TABLET: 25; 250 TABLET ORAL at 04:54

## 2021-05-10 ASSESSMENT — PAIN DESCRIPTION - PAIN TYPE: TYPE: ACUTE PAIN

## 2021-05-10 NOTE — CARE PLAN
Problem: Safety  Goal: Will remain free from falls  Outcome: PROGRESSING AS EXPECTED  Intervention: Assess risk factors for falls  Flowsheets (Taken 5/10/2021 0948)  Pt Calls for Assistance:   No   Yes  Fall Risk: High Risk to Fall - 2 or more points   Mobility Status Assessment: 1-1 Healthcare Provider Required for Assistance with Ambulation & Transfer  Risk for Injury-Any positive answers results in the pt being at high risk for fall related injury: Not Applicable  Intervention: Implement fall precautions  Flowsheets (Taken 5/10/2021 0948)  Bed Alarm: Yes - Alarm On  Environmental Precautions:   Treaded Slipper Socks on Patient   Transferred to Stronger Side   Report Given to Other Health Care Providers Regarding Fall Risk   Personal Belongings, Wastebasket, Call Bell etc. in Easy Reach   Bed in Low Position   Communication Sign for Patients & Families   Mobility Assessed & Appropriate Sign Placed  Chair/Bed Strip Alarm: Yes - Alarm On     Problem: Discharge Barriers/Planning  Goal: Patient's continuum of care needs will be met  Outcome: PROGRESSING SLOWER THAN EXPECTED  Intervention: Assess potential discharge barriers on admission and throughout hospital stay  Flowsheets (Taken 5/10/2021 0948)  Does Admitting Nurse Feel This Could be a Complex Discharge?: Yes  Note:   Pt currently pending insurance for placement.

## 2021-05-10 NOTE — DISCHARGE PLANNING
Agency/Facility Name: [facility]  Outcome: Left a message for [facility] in admissions.  Awaiting a call back.

## 2021-05-10 NOTE — PROGRESS NOTES
Bedside report received. Pt is A&O X4, on RA, VSS.  He is able to ambulate by self with FWW, standby assistance. Pt denies having any pain at the present time. Bed alarm is on, hourly rounds in place.POC discussed with pt; all questions answered at this time.

## 2021-05-10 NOTE — PROGRESS NOTES
Assessment/description of ears?   Dry, intact  Which preventative measures are in place for the ears?  Foam cover pads on glasses ear pieces    Assessment/description of elbows?  Pink, blanching, intact  Which preventative measures are in place for the elbows?  Pt frequently repositions self    Assessment/description of sacrum?  Pink, blanching, intact  Which preventative measures are in place for the sacrum?  Pt frequently repositions self    Assessment/description of heels?  Dry, pink, blanching  Which preventative measures are in place for the heels?  Pt frequently repositions self    Which devices are in place? PIV, glasses  Description of skin under devices: Intact  Which preventative measures are in place under devices?  Grey foam on glasses    Other:

## 2021-05-11 PROCEDURE — A9270 NON-COVERED ITEM OR SERVICE: HCPCS | Performed by: NURSE PRACTITIONER

## 2021-05-11 PROCEDURE — 700102 HCHG RX REV CODE 250 W/ 637 OVERRIDE(OP): Performed by: HOSPITALIST

## 2021-05-11 PROCEDURE — G0378 HOSPITAL OBSERVATION PER HR: HCPCS

## 2021-05-11 PROCEDURE — 700111 HCHG RX REV CODE 636 W/ 250 OVERRIDE (IP): Performed by: STUDENT IN AN ORGANIZED HEALTH CARE EDUCATION/TRAINING PROGRAM

## 2021-05-11 PROCEDURE — A9270 NON-COVERED ITEM OR SERVICE: HCPCS | Performed by: STUDENT IN AN ORGANIZED HEALTH CARE EDUCATION/TRAINING PROGRAM

## 2021-05-11 PROCEDURE — 700102 HCHG RX REV CODE 250 W/ 637 OVERRIDE(OP): Performed by: STUDENT IN AN ORGANIZED HEALTH CARE EDUCATION/TRAINING PROGRAM

## 2021-05-11 PROCEDURE — 96372 THER/PROPH/DIAG INJ SC/IM: CPT

## 2021-05-11 PROCEDURE — A9270 NON-COVERED ITEM OR SERVICE: HCPCS | Performed by: HOSPITALIST

## 2021-05-11 PROCEDURE — 700102 HCHG RX REV CODE 250 W/ 637 OVERRIDE(OP): Performed by: NURSE PRACTITIONER

## 2021-05-11 RX ADMIN — CARBIDOPA AND LEVODOPA 1 TABLET: 25; 250 TABLET ORAL at 23:56

## 2021-05-11 RX ADMIN — ROSUVASTATIN CALCIUM 40 MG: 20 TABLET, FILM COATED ORAL at 04:30

## 2021-05-11 RX ADMIN — ACETAMINOPHEN 650 MG: 325 TABLET, FILM COATED ORAL at 23:59

## 2021-05-11 RX ADMIN — GABAPENTIN 300 MG: 300 CAPSULE ORAL at 18:58

## 2021-05-11 RX ADMIN — CARBIDOPA AND LEVODOPA 1 TABLET: 25; 250 TABLET ORAL at 04:31

## 2021-05-11 RX ADMIN — DOCUSATE SODIUM 50 MG AND SENNOSIDES 8.6 MG 2 TABLET: 8.6; 5 TABLET, FILM COATED ORAL at 04:31

## 2021-05-11 RX ADMIN — GABAPENTIN 300 MG: 300 CAPSULE ORAL at 11:34

## 2021-05-11 RX ADMIN — AMITRIPTYLINE HYDROCHLORIDE 25 MG: 25 TABLET, FILM COATED ORAL at 18:58

## 2021-05-11 RX ADMIN — CARBIDOPA AND LEVODOPA 1 TABLET: 25; 250 TABLET ORAL at 11:34

## 2021-05-11 RX ADMIN — IBUPROFEN 400 MG: 400 TABLET, FILM COATED ORAL at 21:22

## 2021-05-11 RX ADMIN — TAMSULOSIN HYDROCHLORIDE 0.4 MG: 0.4 CAPSULE ORAL at 04:31

## 2021-05-11 RX ADMIN — DOCUSATE SODIUM 50 MG AND SENNOSIDES 8.6 MG 2 TABLET: 8.6; 5 TABLET, FILM COATED ORAL at 18:58

## 2021-05-11 RX ADMIN — CARBIDOPA AND LEVODOPA 1 TABLET: 25; 250 TABLET ORAL at 19:01

## 2021-05-11 RX ADMIN — GABAPENTIN 300 MG: 300 CAPSULE ORAL at 04:30

## 2021-05-11 RX ADMIN — ASPIRIN 81 MG: 81 TABLET, COATED ORAL at 04:31

## 2021-05-11 RX ADMIN — TRAMADOL HYDROCHLORIDE 50 MG: 50 TABLET ORAL at 20:26

## 2021-05-11 RX ADMIN — ENOXAPARIN SODIUM 40 MG: 40 INJECTION SUBCUTANEOUS at 04:30

## 2021-05-11 ASSESSMENT — PAIN DESCRIPTION - PAIN TYPE
TYPE: ACUTE PAIN
TYPE: NEUROPATHIC PAIN

## 2021-05-11 NOTE — CARE PLAN
Problem: Pain Management  Goal: Pain level will decrease to patient's comfort goal  Outcome: PROGRESSING AS EXPECTED  Note: Discussed pain comfort goal, administered medication per MAR, repositioned      Problem: Skin Integrity  Goal: Risk for impaired skin integrity will decrease  Description: Intervention: full skin check, mepilex to elbows and heels, barrier cream to sacrum  Outcome: no new pressure injuries noted at this time   Outcome: PROGRESSING AS EXPECTED  Note: Applied mepilex to bilat elbows, encourage use of pillow for support/offloading heels; no new pressure injuries noted at this time

## 2021-05-11 NOTE — PROGRESS NOTES
Pt is A&OX3, disoriented to time. Pt is resting in bed, no signs of labored breathing or pain. Pt on RA. Call light & personal belongings within reach, bed in lowest position & locked, and bed alarm is on. Fall precautions in place and education provided on how to use call light. Pt updated on plan of care for the shift. Pt declines any additional needs at this time.

## 2021-05-11 NOTE — PROGRESS NOTES
Assessment/description of ears? Some dryness/flakiness  Which preventative measures are in place for the ears?  -Pt removed glasses to sleep    Assessment/description of elbows? R) red, blanching  Which preventative measures are in place for the elbows?  -mepilex to bilat elbows    Assessment/description of sacrum? Red, blanching  Which preventative measures are in place for the sacrum?  - encouraged frequent repositioning     Assessment/description of heels? Dry, pink, blanching   Which preventative measures are in place for the heels?  -pillows to offload heels     Which devices are in place? none  Description of skin under devices:  Which preventative measures are in place under devices?    Other: lower extremities are dry, dusky; face is dry and flaky.

## 2021-05-12 PROCEDURE — 700102 HCHG RX REV CODE 250 W/ 637 OVERRIDE(OP): Performed by: STUDENT IN AN ORGANIZED HEALTH CARE EDUCATION/TRAINING PROGRAM

## 2021-05-12 PROCEDURE — A9270 NON-COVERED ITEM OR SERVICE: HCPCS | Performed by: STUDENT IN AN ORGANIZED HEALTH CARE EDUCATION/TRAINING PROGRAM

## 2021-05-12 PROCEDURE — G0378 HOSPITAL OBSERVATION PER HR: HCPCS

## 2021-05-12 PROCEDURE — A9270 NON-COVERED ITEM OR SERVICE: HCPCS | Performed by: HOSPITALIST

## 2021-05-12 PROCEDURE — A9270 NON-COVERED ITEM OR SERVICE: HCPCS | Performed by: NURSE PRACTITIONER

## 2021-05-12 PROCEDURE — 700102 HCHG RX REV CODE 250 W/ 637 OVERRIDE(OP): Performed by: NURSE PRACTITIONER

## 2021-05-12 PROCEDURE — 700102 HCHG RX REV CODE 250 W/ 637 OVERRIDE(OP): Performed by: HOSPITALIST

## 2021-05-12 RX ADMIN — DOCUSATE SODIUM 50 MG AND SENNOSIDES 8.6 MG 2 TABLET: 8.6; 5 TABLET, FILM COATED ORAL at 18:00

## 2021-05-12 RX ADMIN — IBUPROFEN 400 MG: 400 TABLET, FILM COATED ORAL at 23:13

## 2021-05-12 RX ADMIN — CARBIDOPA AND LEVODOPA 1 TABLET: 25; 250 TABLET ORAL at 11:00

## 2021-05-12 RX ADMIN — GABAPENTIN 300 MG: 300 CAPSULE ORAL at 17:36

## 2021-05-12 RX ADMIN — ASPIRIN 81 MG: 81 TABLET, COATED ORAL at 05:35

## 2021-05-12 RX ADMIN — GABAPENTIN 300 MG: 300 CAPSULE ORAL at 05:35

## 2021-05-12 RX ADMIN — OXYCODONE 2.5 MG: 5 TABLET ORAL at 08:15

## 2021-05-12 RX ADMIN — GABAPENTIN 300 MG: 300 CAPSULE ORAL at 10:12

## 2021-05-12 RX ADMIN — CARBIDOPA AND LEVODOPA 1 TABLET: 25; 250 TABLET ORAL at 17:36

## 2021-05-12 RX ADMIN — CARBIDOPA AND LEVODOPA 1 TABLET: 25; 250 TABLET ORAL at 04:30

## 2021-05-12 RX ADMIN — OXYCODONE 2.5 MG: 5 TABLET ORAL at 20:48

## 2021-05-12 RX ADMIN — AMITRIPTYLINE HYDROCHLORIDE 25 MG: 25 TABLET, FILM COATED ORAL at 17:36

## 2021-05-12 RX ADMIN — OXYCODONE 2.5 MG: 5 TABLET ORAL at 16:53

## 2021-05-12 RX ADMIN — TAMSULOSIN HYDROCHLORIDE 0.4 MG: 0.4 CAPSULE ORAL at 05:35

## 2021-05-12 RX ADMIN — IBUPROFEN 400 MG: 400 TABLET, FILM COATED ORAL at 04:30

## 2021-05-12 RX ADMIN — ROSUVASTATIN CALCIUM 40 MG: 20 TABLET, FILM COATED ORAL at 05:35

## 2021-05-12 RX ADMIN — CARBIDOPA AND LEVODOPA 1 TABLET: 25; 250 TABLET ORAL at 23:13

## 2021-05-12 ASSESSMENT — PAIN DESCRIPTION - PAIN TYPE
TYPE: ACUTE PAIN
TYPE: ACUTE PAIN;CHRONIC PAIN

## 2021-05-12 NOTE — DISCHARGE PLANNING
Agency/Facility Name: [facility]  Outcome: DPA received a voice message from [facility] 5/11/21 @ 8099  Insurance auth has been denied, there is no skilled need.  Pt does not need PT anymore so all he needs is OT.  Home health is recommended for continued physical therapy.

## 2021-05-12 NOTE — CARE PLAN
Problem: Communication  Goal: The ability to communicate needs accurately and effectively will improve  Outcome: Progressing  Note: Pt Alert, awake and oriented to person, place, time and situation.  Able to make needs known, Uses call light appropriately.       Problem: Safety  Goal: Will remain free from injury  Outcome: Progressing  Note: 17 risk for falls.  Non skid socks, fall band on, hourly rounding in place, call light within reach, path free of clutter. Pt calls appropriately. Education provided on need to call staff for assistance with mobility and pt states understanding.   Goal: Will remain free from falls  Outcome: Progressing     Problem: Infection  Goal: Will remain free from infection  Outcome: Progressing  Note: Standard precautions in place, labs and VS monitored as available, promote nutrition. No s/sx infection at this time.    The patient is Stable - Low risk of patient condition declining or worsening    Shift Goals  Clinical Goals: Patient's arthritic pain will be controlled this shift

## 2021-05-12 NOTE — CARE PLAN
Problem: Pain Management  Goal: Pain level will decrease to patient's comfort goal  Outcome: PROGRESSING AS EXPECTED  Intervention: Follow pain managment plan developed in collaboration with patient and Interdisciplinary Team  Note: Discussed pain management with patient. Patient at the time of assessment did not need any additional PRN medications such as Tramadol or oxy. However patient did receive his schedule medications (such as gabapentin) which he stated kept his pain manageable.      Problem: Mobility  Goal: Risk for activity intolerance will decrease  Outcome: PROGRESSING AS EXPECTED  Intervention: Encourage patient to increase activity level in collaboration with Interdisciplinary Team  Note: Patient encouraged to increase mobility by setting a goal to be out of bed for all three meals and mobilizing to the bathroom instead of using urinal. Though patient did sit up at the edge of the bed for all meals he did use the urinal instead of ambulating. Will continue to work on mobility.

## 2021-05-12 NOTE — DISCHARGE PLANNING
Medical Social Work    Anticipated Discharge Disposition: Home w/ Home Health (HH)    Action: LSW notified that pt is declined to SNF.  LSW spoke w/ pt's brother, Florinda Mathur, and informed him that pt has been declined to SNF.  LSW explained SNF choice and brother agreeable w/ Home Health services and provided verbal authorization on choice form.  Pt does not have a PCP as pt is new to the area.  Home Health referrals sent to [facility] and [facility].  LSW faxed choice to [facility].      LSW also spoke to pt's brother about calling [facility] Disability to follow up w/ pt's , Luba Ba, regarding group home placement.  Pt to return home w/ brother and  to assist family with finding pt a group home.     Barriers to Discharge: HH acceptance and establishment w/ PCP.     Plan: LSW will continue to follow for HH acceptance.

## 2021-05-12 NOTE — DISCHARGE PLANNING
Agency/Facility Name: [facility]  Spoke To: Amber  Outcome: Referral is under review.  Amber made a PCP appt for patient.  Appt is 5/27/21 at 0820.  Check in time is 0750.  Dr. Ferreira  [facility]  [address], [place]  [id number] [phone]    [facility] Claudette notified via Teams.

## 2021-05-12 NOTE — PROGRESS NOTES
Assessment/description of ears? Intact and blanching  Which preventative measures are in place for the ears? Foams present on personal glasses    Assessment/description of elbows? Intact and blanching  Which preventative measures are in place for the elbows? Mepilex    Assessment/description of sacrum? Intact and blanching  Which preventative measures are in place for the sacrum? Waffle mattress, patient up to ambulate frequently, sits in chair for meals    Assessment/description of heels? Intact, dry and blanching  Which preventative measures are in place for the heels? Heels floated on pillows, patient refuses mepilex and heel float boots    Which devices are in place? N/A

## 2021-05-12 NOTE — CARE PLAN
Problem: Pain Management  Goal: Pain level will decrease to patient's comfort goal  Outcome: PROGRESSING SLOWER THAN EXPECTED  Note: Pt educated about his pain regimen. Pharmacological and no pharmacological measures provided.      Problem: Mobility  Goal: Risk for activity intolerance will decrease  Outcome: PROGRESSING AS EXPECTED  Note: Ambulated the wiggins, half lap around the unit, tolerates well. Pt encouraged to ambulate daily.

## 2021-05-12 NOTE — DISCHARGE PLANNING
Received Choice form at 2399  Agency/Facility Name: [facility]  Referral sent per Choice form @ 5157

## 2021-05-12 NOTE — DISCHARGE PLANNING
Agency/Facility Name: [facility]  Spoke To: Sarah  Outcome: SAR[facility]JIT called stating the patient's brother can give permission to [facility] to find the patient a PCP.  DPA also stated the home health referral was re-faxed with the home health order.

## 2021-05-12 NOTE — PROGRESS NOTES
Assumed care of pt at shift change, report received. Pt A&Ox4, resting in bed. C/o 5/10 bilateral hand and leg pain, medicated per MAR. Denies any other s/s of discomfort. Assisted with ambulation, pt states it helps him sleep better and with his pain. Pt able to ambulate half of the wiggins with fww and standby assist, tolerates well. Bed locked and in low position, alarm on.

## 2021-05-12 NOTE — PROGRESS NOTES
Late entry 1000: Patient is AAO x 4 this morning. Denies pain at the time of assessment. Discussed POC including that we continue to wait for insurance authorization in order to go to [place]. Patient verbalizes understanding. Discussed plan to be out of bed for all meals which patient was agreeable to. Bed is locked and in low postion, call light in reach, hourly rounding place.

## 2021-05-12 NOTE — FACE TO FACE
Face to Face Note  -  Durable Medical Equipment    NITA Leonard - NPI: [id number]  I certify that this patient is under my care and that they have had a durable medical equipment(DME)face to face encounter by myself that meets the physician DME face-to-face encounter requirements with this patient on:    Date of encounter:   Patient:                    MRN:                       YOB: 2021  Dinah Mathur  [id number]  1955     The encounter with the patient was in whole, or in part, for the following medical condition, which is the primary reason for durable medical equipment:  Other - Parkinson's, Debility    I certify that, based on my findings, the following durable medical equipment is medically necessary:  Walkers, front wheel.    HOME O2 Saturation Measurements:(Values must be present for Home Oxygen orders)         ,     ,         My Clinical findings support the need for the above equipment due to:  Abnormal Gait    Supporting Symptoms: PT, OT assessments    ------------------------------------------------------------------------------------------------------------------    Face to Face Supporting Documentation - Home Health    The encounter with this patient was in whole or in part the primary reason for home health admission.    Date of encounter:   Patient:                    MRN:                       YOB: 2021  Dinah Mathur  [id number]  1955     Home health to see patient for:  Skilled Nursing care for assessment, interventions & education, Physical Therapy evaluation and treatment, Occupational therapy evaluation and treatment and Speech Language Pathology evaluation and treatment    Skilled need for:  Exacerbation of Chronic Disease State bilateral lower extremity venous insufficency    Skilled nursing interventions to include:  Comment: assess and treat    Homebound evidenced status by:  Need the aid of supportive devices such as  crutches, canes, wheelchairs or walkers or Needs the assistance of another person in order to leave the home. Leaving home must require a considerable and taxing effort. There must exist a normal inability to leave the home.    Community Physician to provide follow up care: Pcp Pt States None     Optional Interventions    Wound information & treatment:    Home Infusion Therapy orders:    Line/Drain/Airway:    I certify the face to face encounter for this home care referral meets the CMS requirements and the encounter/clinical assessment with the patient was, in whole, or in part, for the medical condition(s) listed above, which is the primary reason for home health care. Based on my clinical findings: the service(s) are medically necessary, support the need for home health care, and the homebound criteria are met.  I certify that this patient has had a face to face encounter by myself.  JT Leonard. - NPI: [id number]    *Debility, frailty and advanced age in the absence of an acute deterioration or exacerbation of a condition do not qualify a patient for home health.

## 2021-05-12 NOTE — PROGRESS NOTES
Late entry 1000:     Assessment/description of ears? Intact, pink and blanching  Which preventative measures are in place for the ears? Foams around the ears. Patient wearing glasses only while watching TV. Patient had not been wearing glasses since previous shift when assessed.     Assessment/description of elbows? Dry, pink and blanching   Which preventative measures are in place for the elbows? Bilateral mepilex in place. Patient able to turn self and encouraged to do so frequently.     Assessment/description of sacrum? Pink/red, blanches, intact with no openings.   Which preventative measures are in place for the sacrum? Waffle overlay in use. Patient encouraged to change positions frequently while in bed    Assessment/description of heels? Dry, calloused, pink and blanching.   Which preventative measures are in place for the heels? Patient sitting at edge of bed for meals, floating feet on pillows periodically throughout shift     Which devices are in place? NA  Description of skin under devices: NA  Which preventative measures are in place under devices? NA     Other: BLE are dry and dusky. Face is dry and flaky. Encouraged patient to moisturize and provided a wet wash cloth to clean face.

## 2021-05-12 NOTE — DISCHARGE PLANNING
Agency/Facility Name: [facility]  Spoke To: Amber  Outcome: Amber is working on finding the pt a PCP.  Amber will contact the patient's brother regarding how the patient will get to PCP appts and location of PCP's accepting new pt's.

## 2021-05-13 ENCOUNTER — PHARMACY VISIT (OUTPATIENT)
Dept: PHARMACY | Facility: MEDICAL CENTER | Age: 66
End: 2021-05-13
Payer: COMMERCIAL

## 2021-05-13 VITALS
HEART RATE: 89 BPM | WEIGHT: 171.08 LBS | BODY MASS INDEX: 25.34 KG/M2 | OXYGEN SATURATION: 90 % | RESPIRATION RATE: 18 BRPM | SYSTOLIC BLOOD PRESSURE: 112 MMHG | DIASTOLIC BLOOD PRESSURE: 56 MMHG | TEMPERATURE: 97.6 F | HEIGHT: 69 IN

## 2021-05-13 PROBLEM — Z59.00 HOMELESS: Status: RESOLVED | Noted: 2021-04-25 | Resolved: 2021-05-13

## 2021-05-13 PROCEDURE — G0378 HOSPITAL OBSERVATION PER HR: HCPCS

## 2021-05-13 PROCEDURE — 96372 THER/PROPH/DIAG INJ SC/IM: CPT

## 2021-05-13 PROCEDURE — 700111 HCHG RX REV CODE 636 W/ 250 OVERRIDE (IP): Performed by: STUDENT IN AN ORGANIZED HEALTH CARE EDUCATION/TRAINING PROGRAM

## 2021-05-13 PROCEDURE — RXMED WILLOW AMBULATORY MEDICATION CHARGE: Performed by: NURSE PRACTITIONER

## 2021-05-13 PROCEDURE — 700102 HCHG RX REV CODE 250 W/ 637 OVERRIDE(OP): Performed by: STUDENT IN AN ORGANIZED HEALTH CARE EDUCATION/TRAINING PROGRAM

## 2021-05-13 PROCEDURE — A9270 NON-COVERED ITEM OR SERVICE: HCPCS | Performed by: NURSE PRACTITIONER

## 2021-05-13 PROCEDURE — A9270 NON-COVERED ITEM OR SERVICE: HCPCS | Performed by: STUDENT IN AN ORGANIZED HEALTH CARE EDUCATION/TRAINING PROGRAM

## 2021-05-13 PROCEDURE — 99217 PR OBSERVATION CARE DISCHARGE: CPT | Performed by: INTERNAL MEDICINE

## 2021-05-13 PROCEDURE — 700102 HCHG RX REV CODE 250 W/ 637 OVERRIDE(OP): Performed by: NURSE PRACTITIONER

## 2021-05-13 RX ORDER — IBUPROFEN 400 MG/1
400 TABLET ORAL EVERY 6 HOURS PRN
Qty: 30 TABLET | COMMUNITY
Start: 2021-05-13 | End: 2021-08-01

## 2021-05-13 RX ORDER — ACETAMINOPHEN 325 MG/1
650 TABLET ORAL EVERY 6 HOURS PRN
Qty: 30 TABLET | Refills: 0 | COMMUNITY
Start: 2021-05-13 | End: 2021-08-01

## 2021-05-13 RX ORDER — AMITRIPTYLINE HYDROCHLORIDE 25 MG/1
25 TABLET, FILM COATED ORAL EVERY EVENING
Qty: 30 TABLET | Refills: 1 | Status: SHIPPED | OUTPATIENT
Start: 2021-05-13 | End: 2021-08-01

## 2021-05-13 RX ORDER — AMOXICILLIN 250 MG
2 CAPSULE ORAL DAILY
Qty: 30 TABLET | Refills: 0 | Status: SHIPPED | OUTPATIENT
Start: 2021-05-13 | End: 2021-08-01

## 2021-05-13 RX ORDER — OXYCODONE HYDROCHLORIDE 5 MG/1
2.5 TABLET ORAL EVERY 6 HOURS PRN
Qty: 20 TABLET | Refills: 0 | Status: SHIPPED | OUTPATIENT
Start: 2021-05-13 | End: 2021-05-18

## 2021-05-13 RX ADMIN — DOCUSATE SODIUM 50 MG AND SENNOSIDES 8.6 MG 2 TABLET: 8.6; 5 TABLET, FILM COATED ORAL at 05:24

## 2021-05-13 RX ADMIN — CARBIDOPA AND LEVODOPA 1 TABLET: 25; 250 TABLET ORAL at 05:25

## 2021-05-13 RX ADMIN — OXYCODONE 2.5 MG: 5 TABLET ORAL at 12:01

## 2021-05-13 RX ADMIN — ENOXAPARIN SODIUM 40 MG: 40 INJECTION SUBCUTANEOUS at 05:25

## 2021-05-13 RX ADMIN — CARBIDOPA AND LEVODOPA 1 TABLET: 25; 250 TABLET ORAL at 12:02

## 2021-05-13 RX ADMIN — ROSUVASTATIN CALCIUM 40 MG: 20 TABLET, FILM COATED ORAL at 05:24

## 2021-05-13 RX ADMIN — ASPIRIN 81 MG: 81 TABLET, COATED ORAL at 05:24

## 2021-05-13 RX ADMIN — TAMSULOSIN HYDROCHLORIDE 0.4 MG: 0.4 CAPSULE ORAL at 05:24

## 2021-05-13 RX ADMIN — GABAPENTIN 300 MG: 300 CAPSULE ORAL at 05:24

## 2021-05-13 RX ADMIN — GABAPENTIN 300 MG: 300 CAPSULE ORAL at 12:02

## 2021-05-13 ASSESSMENT — PAIN DESCRIPTION - PAIN TYPE
TYPE: ACUTE PAIN

## 2021-05-13 NOTE — PROGRESS NOTES
Assumed care of pt at shift change, report received. Pt is A&Ox4, resting in bed. C/o 8/10 generalized pain, medicated per MAR. Denies any other s/s of distress. Able to tolerate a walk around the unit with one person assist and fww, shuffle gait. Assisted with shower using shower chair, minimal assistance for upper body, assistance required for lower body. Bed locked and in low position, bed alarm on.

## 2021-05-13 NOTE — DISCHARGE PLANNING
Meds-to-Beds: Discharge prescription orders listed below delivered to patient's bedside. DASIA Bassett notified. Written information regarding the dispensed prescriptions was provided to the patient including the phone number of the pharmacy to call for any questions. Patient elected to have co-payment billed to patient account.      IsabelDinah sales   Home Medication Instructions MARSHALL:[id number]    Printed on:05/13/21 0711   Medication Information                      amitriptyline (ELAVIL) 25 MG Tab  Take 1 tablet by mouth every evening.             oxyCODONE immediate-release (ROXICODONE) 5 MG Tab  Take half a tablet by mouth every 6 hours as needed for up to 5 days.             senna-docusate (PERICOLACE OR SENOKOT S) 8.6-50 MG Tab  Take 2 Tablets by mouth every day.                 Junie Colmenares, PharmD

## 2021-05-13 NOTE — PROGRESS NOTES
Assessment/description of ears? Intact, pink, blanching  Which preventative measures are in place for the ears? q shift assessment, removal of glasses when sleeping    Assessment/description of elbows? Dry, intact, blanching   Which preventative measures are in place for the elbows? NA, pt turns self side-to-side    Assessment/description of sacrum? Inact, blanching  Which preventative measures are in place for the sacrum? Encouraged ot to turn frequently, use of extra pillow for support    Assessment/description of heels? Intact, pink, blanching  Which preventative measures are in place for the heels? NA, pt is ambulatory    Which devices are in place?  NA  Description of skin under devices:  Which preventative measures are in place under devices?    Other: Waffle mattress in place

## 2021-05-13 NOTE — CARE PLAN
The patient is Stable - Low risk of patient condition declining or worsening    Shift Goals  Clinical Goals: Pt will be able to tolerate ambulating one lap around the unit    Summary of progress made towards problems/goals:  patient ambulated one lap around the unit; tolerated without pain

## 2021-05-13 NOTE — DISCHARGE PLANNING
Agency/Facility Name: [facility]  Outcome: This DPA received a voice message from Sarah.  Referral has been accepted.  Sarah stated that liaison Amber made a PCP appt for patient.  This DPA spoke with Amber yesterday and documented date, place, and time for appt.  DPA printed appt information and gave to LSW David to give to family.

## 2021-05-13 NOTE — DISCHARGE SUMMARY
Discharge Summary    CHIEF COMPLAINT ON ADMISSION  Chief Complaint   Patient presents with   • Leg Swelling     BLE swelling, chronic x1 year with pain   • Weakness     generalized       Reason for Admission  Bilateral lower extremity swelling and pain    CODE STATUS  Full Code    HPI & HOSPITAL COURSE   Mr. Mathur is a homeless 65-year-old male from [place] with a PMHx of diabetes mellitus type II, Parkinson disease, osteoarthritis of both hands, and venous insufficiency of bilateral lower extremities who presented to the ED on 4/25/2021 with complaints of acute on chronic BLE swelling and pain. He apparently hitchhiked to [place] two months prior to admission and stated that he has had the pain for years. The swelling reportedly improves with furosemide and leg elevation. He used to take meloxicam and tramadol for the pain and for a fall he sustained a year ago when he hit his head while he was intoxicated. Now he only takes metformin for his diabetes and is only intermittently compliant. His A1c on arrival was 6.8% and inflammatory markers were normal. A chest xray was normal and an US of his BLE were negative for DVT. The swelling was thought secondary to venous insufficiency. The patient was evaluated by PT/OT and SLP who recommended placement, however the patient was not accepted by SNF.  Patient's brother has agreed to have the patient live with him with home health service.  Today the patient is sitting upright for breakfast, pleasant and cooperative.  He is fully alert and oriented.  He has 2/10 pain in his bilateral legs, feet and hands.  His pain is well managed on current regimen.  He denies headache, dizziness, chest pain, abdominal pain and any other problems.  -Bilateral lower extremity edema resolved.  Strong bilateral pedal pulses.  Continue compression stockings and elevation as needed.  -Diabetes is well controlled with diet alone  -Continue Sinemet for Parkinson disease    Therefore, he is  discharged in good and stable condition to home with organized home healthcare and close outpatient follow-up.    The patient met 2-midnight criteria for an inpatient stay at the time of discharge.      FOLLOW UP ITEMS POST DISCHARGE  Medications and home health as below    DISCHARGE DIAGNOSES  Principal Problem:    Failure to thrive in adult POA: Yes  Active Problems:    Venous insufficiency of both lower extremities, chronic POA: Yes      Overview:           Type 2 diabetes mellitus, without long-term current use of insulin (HCC) POA: Yes    Osteoarthritis of both hands POA: Yes    Parkinson disease (HCC) POA: Yes  Resolved Problems:    Homeless POA: Yes      FOLLOW UP  No future appointments.  [address]  [facility] [id number] [phone]          MEDICATIONS ON DISCHARGE     Medication List      START taking these medications      Instructions   acetaminophen 325 MG Tabs  Commonly known as: Tylenol   Take 2 Tablets by mouth every 6 hours as needed.  Dose: 650 mg     amitriptyline 25 MG Tabs  Commonly known as: ELAVIL   Take 1 tablet by mouth every evening.  Dose: 25 mg     ibuprofen 400 MG Tabs  Commonly known as: MOTRIN   Take 1 tablet by mouth every 6 hours as needed (arthritic pain).  Dose: 400 mg     oxyCODONE immediate-release 5 MG Tabs  Commonly known as: ROXICODONE   Take 0.5 Tablets by mouth every 6 hours as needed for up to 5 days.  Dose: 2.5 mg     senna-docusate 8.6-50 MG Tabs  Commonly known as: PERICOLACE or SENOKOT S   Take 2 Tablets by mouth every day.  Dose: 2 tablet        CONTINUE taking these medications      Instructions   aspirin 81 MG EC tablet   Take 81 mg by mouth every day.  Dose: 81 mg     carbidopa-levodopa  MG Tabs  Commonly known as: SINEMET   Take 1 tablet by mouth 4 times a day. Indications: Parkinson's Disease  Dose: 1 tablet     gabapentin 300 MG Caps  Commonly known as: NEURONTIN   Take 300 mg by mouth every day.  Dose: 300 mg      rosuvastatin 20 MG Tabs  Commonly known as: CRESTOR   Take 40 mg by mouth every day.  Dose: 40 mg     tamsulosin 0.4 MG capsule  Commonly known as: FLOMAX   Take 0.4 mg by mouth every day.  Dose: 0.4 mg        STOP taking these medications    furosemide 40 MG Tabs  Commonly known as: LASIX     meloxicam 7.5 MG Tabs  Commonly known as: MOBIC     metFORMIN 500 MG Tabs  Commonly known as: GLUCOPHAGE     vitamin D 1000 Unit (25 mcg) Tabs  Commonly known as: cholecalciferol            Allergies  No Known Allergies    DIET  Orders Placed This Encounter   Procedures   • Diet Order Diet: Consistent CHO (Diabetic)     Standing Status:   Standing     Number of Occurrences:   1     Order Specific Question:   Diet:     Answer:   Consistent CHO (Diabetic) [4]       ACTIVITY  As tolerated.  Exercise encouraged.  Weight bearing as tolerated    CONSULTATIONS  none    PROCEDURES  none    LABORATORY  Lab Results   Component Value Date    SODIUM 137 05/03/2021    POTASSIUM 4.1 05/03/2021    CHLORIDE 100 05/03/2021    CO2 29 05/03/2021    GLUCOSE 133 (H) 05/03/2021    BUN 19 05/03/2021    CREATININE 0.95 05/03/2021        Lab Results   Component Value Date    WBC 7.2 05/03/2021    HEMOGLOBIN 14.8 05/03/2021    HEMATOCRIT 44.9 05/03/2021    PLATELETCT 167 05/03/2021        Total time of the discharge process exceeds 32 minutes.    JT Leonard.

## 2021-05-13 NOTE — PROGRESS NOTES
Assessment/description of ears? Pink, intact  Which preventative measures are in place for the ears? Encourage to remove glasses when ready to sleep    Assessment/description of elbows? Pink, intact  Which preventative measures are in place for the elbows? Waffle overlay in use, encouraged to switch positions while in bed, mobility encouraged    Assessment/description of sacrum? Red, blanching, intact  Which preventative measures are in place for the sacrum? Waffle overlay in use, encouraged to switch positions while in bed, mobility encouraged    Assessment/description of heels? Intact, calloused  Which preventative measures are in place for the heels? Waffle overlay in use, encouraged to switch positions while in bed, mobility encouraged    Which devices are in place? Glasses  Description of skin under devices: Intact, pink  Which preventative measures are in place under devices?    Other:

## 2021-05-13 NOTE — PROGRESS NOTES
Assumed care of pt after receiving report from night shift RN. Pt is A&O x4, on RA. Assessment is charted, pt reports 3 out of 10 pain in hands and BLE but declines medication intervention at this time. Pt is pleasant and shows no signs of distress.  Pt is sitting up in chair eating breakfast at this time. Educated pt on fall risk and reinforced use of call light for when he wants to transfer back to bed. Safety and fall precautions in place. Bed is locked and in lowest position, call light within reach. All needs met at this time.

## 2021-05-14 NOTE — PROGRESS NOTES
Pt discharging today with family. Pt meets discharge lounge requirements. Discharge lounge order in place. Pts brother Miguel Ángellachelle notified of pts discharge and to pick pt up within an hr. Discharge lounge DASIA Ohara notified of order and to call brother when pt is ready. Pt is ready for discharge and has meds to beds and fww with him.

## 2021-05-14 NOTE — PROGRESS NOTES
Pt arrives in [place] lounge. No c/o. Denies needs. Discharge instructions given to patient at bedside, verbalizes understanding and states plans for follow-up with PCP. Has no IV cathlon. All belongings accounted for, all questions answered at this time. Spoke with pt's brother, who come to pick him up, helped pt into car.

## 2021-05-14 NOTE — DISCHARGE INSTRUCTIONS
Discharge Instructions    Discharged to home by car with relative. Discharged via wheelchair, hospital escort: Yes.  Special equipment needed: Not Applicable    Be sure to schedule a follow-up appointment with your primary care doctor or any specialists as instructed.     Discharge Plan:   Diet Plan: Discussed - diabetic diet  Activity Level: Discussed  Confirmed Follow up Appointment: Patient to Call and Schedule Appointment  Confirmed Symptoms Management: Discussed  Medication Reconciliation Updated: Yes    I understand that a diet low in cholesterol, fat, and sodium is recommended for good health. Unless I have been given specific instructions below for another diet, I accept this instruction as my diet prescription.   Other diet: diabetic    Instructions: Follow up with [employer]. [phone]    Follow up to establish with a primary care provider. Your Appt is 5/27/21 at 0820.  Check in time is 0745.  Dr. Ferreira [facility] [address] [place]  [address] [phone]    · Is patient discharged on Warfarin / Coumadin?   No     Depression / Suicide Risk    As you are discharged from this [facility] facility, it is important to learn how to keep safe from harming yourself.    Recognize the warning signs:  · Abrupt changes in personality, positive or negative- including increase in energy   · Giving away possessions  · Change in eating patterns- significant weight changes-  positive or negative  · Change in sleeping patterns- unable to sleep or sleeping all the time   · Unwillingness or inability to communicate  · Depression  · Unusual sadness, discouragement and loneliness  · Talk of wanting to die  · Neglect of personal appearance   · Rebelliousness- reckless behavior  · Withdrawal from people/activities they love  · Confusion- inability to concentrate     If you or a loved one observes any of these behaviors or has concerns about self-harm, here's what you can do:  · Talk about it- your  feelings and reasons for harming yourself  · Remove any means that you might use to hurt yourself (examples: pills, rope, extension cords, firearm)  · Get professional help from the community (Mental Health, Substance Abuse, psychological counseling)  · Do not be alone:Call your Safe Contact- someone whom you trust who will be there for you.  · Call your local CRISIS HOTLINE 838-4333 or 602-866-4915  · Call your local Children's Mobile Crisis Response Team [place] (680) 180-9519 or www.Amplience  · Call the toll free National Suicide Prevention Hotlines   · National Suicide Prevention Lifeline 374-945-KVRL (4754)  · National Hope Line Network 800-SUICIDE (542-3639)

## 2021-05-27 PROCEDURE — RXMED WILLOW AMBULATORY MEDICATION CHARGE: Performed by: FAMILY MEDICINE

## 2021-06-07 ENCOUNTER — PHARMACY VISIT (OUTPATIENT)
Dept: PHARMACY | Facility: MEDICAL CENTER | Age: 66
End: 2021-06-07
Payer: COMMERCIAL

## 2021-06-07 PROCEDURE — RXMED WILLOW AMBULATORY MEDICATION CHARGE: Performed by: FAMILY MEDICINE

## 2021-06-22 PROCEDURE — RXMED WILLOW AMBULATORY MEDICATION CHARGE: Performed by: FAMILY MEDICINE

## 2021-06-24 ENCOUNTER — PHARMACY VISIT (OUTPATIENT)
Dept: PHARMACY | Facility: MEDICAL CENTER | Age: 66
End: 2021-06-24
Payer: COMMERCIAL

## 2021-08-01 ENCOUNTER — APPOINTMENT (OUTPATIENT)
Dept: RADIOLOGY | Facility: MEDICAL CENTER | Age: 66
DRG: 087 | End: 2021-08-01
Attending: EMERGENCY MEDICINE
Payer: COMMERCIAL

## 2021-08-01 ENCOUNTER — HOSPITAL ENCOUNTER (INPATIENT)
Facility: MEDICAL CENTER | Age: 66
LOS: 6 days | DRG: 087 | End: 2021-08-07
Attending: EMERGENCY MEDICINE | Admitting: HOSPITALIST
Payer: COMMERCIAL

## 2021-08-01 ENCOUNTER — APPOINTMENT (OUTPATIENT)
Dept: RADIOLOGY | Facility: MEDICAL CENTER | Age: 66
DRG: 087 | End: 2021-08-01
Attending: HOSPITALIST
Payer: COMMERCIAL

## 2021-08-01 DIAGNOSIS — S06.5XAA SDH (SUBDURAL HEMATOMA) (HCC): ICD-10-CM

## 2021-08-01 DIAGNOSIS — S03.2XXA TOOTH AVULSION, INITIAL ENCOUNTER: ICD-10-CM

## 2021-08-01 DIAGNOSIS — G20.A1 PARKINSON DISEASE (HCC): ICD-10-CM
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
## 2021-08-01 DIAGNOSIS — S06.5X0A POST-TRAUMATIC SUBDURAL HEMATOMA, WITHOUT LOSS OF CONSCIOUSNESS, INITIAL ENCOUNTER (HCC): ICD-10-CM

## 2021-08-01 DIAGNOSIS — S09.93XA FACIAL INJURY, INITIAL ENCOUNTER: ICD-10-CM

## 2021-08-01 DIAGNOSIS — R62.7 FAILURE TO THRIVE IN ADULT: ICD-10-CM

## 2021-08-01 DIAGNOSIS — N40.0 BENIGN PROSTATIC HYPERPLASIA WITHOUT LOWER URINARY TRACT SYMPTOMS: ICD-10-CM

## 2021-08-01 PROBLEM — E78.5 HLD (HYPERLIPIDEMIA): Status: ACTIVE | Noted: 2021-08-01

## 2021-08-01 LAB
ALBUMIN SERPL BCP-MCNC: 4.8 G/DL (ref 3.2–4.9)
ALBUMIN/GLOB SERPL: 1.4 G/DL
ALP SERPL-CCNC: 109 U/L (ref 30–99)
ALT SERPL-CCNC: 12 U/L (ref 2–50)
ANION GAP SERPL CALC-SCNC: 12 MMOL/L (ref 7–16)
APTT PPP: 32.5 SEC (ref 24.7–36)
AST SERPL-CCNC: 23 U/L (ref 12–45)
BASOPHILS # BLD AUTO: 0.9 % (ref 0–1.8)
BASOPHILS # BLD: 0.07 K/UL (ref 0–0.12)
BILIRUB SERPL-MCNC: 0.3 MG/DL (ref 0.1–1.5)
BUN SERPL-MCNC: 27 MG/DL (ref 8–22)
CALCIUM SERPL-MCNC: 9.4 MG/DL (ref 8.5–10.5)
CHLORIDE SERPL-SCNC: 103 MMOL/L (ref 96–112)
CO2 SERPL-SCNC: 26 MMOL/L (ref 20–33)
CREAT SERPL-MCNC: 1.1 MG/DL (ref 0.5–1.4)
EKG IMPRESSION: NORMAL
EOSINOPHIL # BLD AUTO: 0.31 K/UL (ref 0–0.51)
EOSINOPHIL NFR BLD: 3.8 % (ref 0–6.9)
ERYTHROCYTE [DISTWIDTH] IN BLOOD BY AUTOMATED COUNT: 44 FL (ref 35.9–50)
GLOBULIN SER CALC-MCNC: 3.4 G/DL (ref 1.9–3.5)
GLUCOSE BLD-MCNC: 126 MG/DL (ref 65–99)
GLUCOSE BLD-MCNC: 126 MG/DL (ref 65–99)
GLUCOSE SERPL-MCNC: 147 MG/DL (ref 65–99)
HCT VFR BLD AUTO: 50.6 % (ref 42–52)
HGB BLD-MCNC: 14.8 G/DL (ref 14–18)
HGB BLD-MCNC: 15 G/DL (ref 14–18)
HGB BLD-MCNC: 16 G/DL (ref 14–18)
IMM GRANULOCYTES # BLD AUTO: 0.02 K/UL (ref 0–0.11)
IMM GRANULOCYTES NFR BLD AUTO: 0.2 % (ref 0–0.9)
INR PPP: 0.99 (ref 0.87–1.13)
LYMPHOCYTES # BLD AUTO: 1.37 K/UL (ref 1–4.8)
LYMPHOCYTES NFR BLD: 17 % (ref 22–41)
MCH RBC QN AUTO: 27.7 PG (ref 27–33)
MCHC RBC AUTO-ENTMCNC: 31.6 G/DL (ref 33.7–35.3)
MCV RBC AUTO: 87.5 FL (ref 81.4–97.8)
MONOCYTES # BLD AUTO: 0.49 K/UL (ref 0–0.85)
MONOCYTES NFR BLD AUTO: 6.1 % (ref 0–13.4)
NEUTROPHILS # BLD AUTO: 5.81 K/UL (ref 1.82–7.42)
NEUTROPHILS NFR BLD: 72 % (ref 44–72)
NRBC # BLD AUTO: 0 K/UL
NRBC BLD-RTO: 0 /100 WBC
PLATELET # BLD AUTO: 195 K/UL (ref 164–446)
PMV BLD AUTO: 9.6 FL (ref 9–12.9)
POTASSIUM SERPL-SCNC: 4.4 MMOL/L (ref 3.6–5.5)
PROT SERPL-MCNC: 8.2 G/DL (ref 6–8.2)
PROTHROMBIN TIME: 12.8 SEC (ref 12–14.6)
RBC # BLD AUTO: 5.78 M/UL (ref 4.7–6.1)
SODIUM SERPL-SCNC: 141 MMOL/L (ref 135–145)
WBC # BLD AUTO: 8.1 K/UL (ref 4.8–10.8)

## 2021-08-01 PROCEDURE — 700101 HCHG RX REV CODE 250: Performed by: EMERGENCY MEDICINE

## 2021-08-01 PROCEDURE — 82962 GLUCOSE BLOOD TEST: CPT | Mod: 91

## 2021-08-01 PROCEDURE — 85610 PROTHROMBIN TIME: CPT

## 2021-08-01 PROCEDURE — 70450 CT HEAD/BRAIN W/O DYE: CPT | Mod: ME

## 2021-08-01 PROCEDURE — 96365 THER/PROPH/DIAG IV INF INIT: CPT

## 2021-08-01 PROCEDURE — 73552 X-RAY EXAM OF FEMUR 2/>: CPT | Mod: LT

## 2021-08-01 PROCEDURE — 90715 TDAP VACCINE 7 YRS/> IM: CPT | Performed by: EMERGENCY MEDICINE

## 2021-08-01 PROCEDURE — 3E0234Z INTRODUCTION OF SERUM, TOXOID AND VACCINE INTO MUSCLE, PERCUTANEOUS APPROACH: ICD-10-PCS | Performed by: EMERGENCY MEDICINE

## 2021-08-01 PROCEDURE — 73030 X-RAY EXAM OF SHOULDER: CPT | Mod: LT

## 2021-08-01 PROCEDURE — 770020 HCHG ROOM/CARE - TELE (206)

## 2021-08-01 PROCEDURE — 93005 ELECTROCARDIOGRAM TRACING: CPT | Performed by: EMERGENCY MEDICINE

## 2021-08-01 PROCEDURE — 80053 COMPREHEN METABOLIC PANEL: CPT

## 2021-08-01 PROCEDURE — 700111 HCHG RX REV CODE 636 W/ 250 OVERRIDE (IP): Performed by: EMERGENCY MEDICINE

## 2021-08-01 PROCEDURE — 770006 HCHG ROOM/CARE - MED/SURG/GYN SEMI*

## 2021-08-01 PROCEDURE — 36415 COLL VENOUS BLD VENIPUNCTURE: CPT

## 2021-08-01 PROCEDURE — 99285 EMERGENCY DEPT VISIT HI MDM: CPT

## 2021-08-01 PROCEDURE — 85018 HEMOGLOBIN: CPT

## 2021-08-01 PROCEDURE — 305950 HCHG YELLOW TRAUMA ACT PRE-NOTIFY NO CC

## 2021-08-01 PROCEDURE — 70486 CT MAXILLOFACIAL W/O DYE: CPT | Mod: ME

## 2021-08-01 PROCEDURE — 85025 COMPLETE CBC W/AUTO DIFF WBC: CPT

## 2021-08-01 PROCEDURE — 90471 IMMUNIZATION ADMIN: CPT

## 2021-08-01 PROCEDURE — 99223 1ST HOSP IP/OBS HIGH 75: CPT | Performed by: HOSPITALIST

## 2021-08-01 PROCEDURE — 85730 THROMBOPLASTIN TIME PARTIAL: CPT

## 2021-08-01 RX ORDER — DEXTROSE MONOHYDRATE 25 G/50ML
50 INJECTION, SOLUTION INTRAVENOUS
Status: DISCONTINUED | OUTPATIENT
Start: 2021-08-01 | End: 2021-08-06

## 2021-08-01 RX ORDER — TRANEXAMIC ACID 100 MG/ML
5 INJECTION, SOLUTION INTRAVENOUS ONCE
Status: COMPLETED | OUTPATIENT
Start: 2021-08-01 | End: 2021-08-01

## 2021-08-01 RX ORDER — AMITRIPTYLINE HYDROCHLORIDE 25 MG/1
25 TABLET, FILM COATED ORAL EVERY EVENING
Status: DISCONTINUED | OUTPATIENT
Start: 2021-08-01 | End: 2021-08-07 | Stop reason: HOSPADM

## 2021-08-01 RX ORDER — ENALAPRILAT 1.25 MG/ML
1.25 INJECTION INTRAVENOUS EVERY 6 HOURS PRN
Status: DISCONTINUED | OUTPATIENT
Start: 2021-08-01 | End: 2021-08-07 | Stop reason: HOSPADM

## 2021-08-01 RX ORDER — CEFAZOLIN SODIUM 1 G/50ML
1 INJECTION, SOLUTION INTRAVENOUS ONCE
Status: COMPLETED | OUTPATIENT
Start: 2021-08-01 | End: 2021-08-01

## 2021-08-01 RX ORDER — ONDANSETRON 4 MG/1
4 TABLET, ORALLY DISINTEGRATING ORAL EVERY 4 HOURS PRN
Status: DISCONTINUED | OUTPATIENT
Start: 2021-08-01 | End: 2021-08-07 | Stop reason: HOSPADM

## 2021-08-01 RX ORDER — ONDANSETRON 2 MG/ML
4 INJECTION INTRAMUSCULAR; INTRAVENOUS EVERY 4 HOURS PRN
Status: DISCONTINUED | OUTPATIENT
Start: 2021-08-01 | End: 2021-08-07 | Stop reason: HOSPADM

## 2021-08-01 RX ORDER — BISACODYL 10 MG
10 SUPPOSITORY, RECTAL RECTAL
Status: DISCONTINUED | OUTPATIENT
Start: 2021-08-01 | End: 2021-08-07 | Stop reason: HOSPADM

## 2021-08-01 RX ORDER — CARBIDOPA/LEVODOPA 25MG-250MG
1 TABLET ORAL EVERY 6 HOURS
Status: DISCONTINUED | OUTPATIENT
Start: 2021-08-01 | End: 2021-08-07 | Stop reason: HOSPADM

## 2021-08-01 RX ORDER — ACETAMINOPHEN 325 MG/1
650 TABLET ORAL EVERY 6 HOURS PRN
Status: DISCONTINUED | OUTPATIENT
Start: 2021-08-01 | End: 2021-08-07 | Stop reason: HOSPADM

## 2021-08-01 RX ORDER — TAMSULOSIN HYDROCHLORIDE 0.4 MG/1
0.4 CAPSULE ORAL DAILY
Status: DISCONTINUED | OUTPATIENT
Start: 2021-08-01 | End: 2021-08-07 | Stop reason: HOSPADM

## 2021-08-01 RX ORDER — LABETALOL HYDROCHLORIDE 5 MG/ML
10 INJECTION, SOLUTION INTRAVENOUS EVERY 4 HOURS PRN
Status: DISCONTINUED | OUTPATIENT
Start: 2021-08-01 | End: 2021-08-07 | Stop reason: HOSPADM

## 2021-08-01 RX ORDER — ROSUVASTATIN CALCIUM 20 MG/1
40 TABLET, COATED ORAL DAILY
Status: DISCONTINUED | OUTPATIENT
Start: 2021-08-02 | End: 2021-08-07 | Stop reason: HOSPADM

## 2021-08-01 RX ORDER — POLYETHYLENE GLYCOL 3350 17 G/17G
1 POWDER, FOR SOLUTION ORAL
Status: DISCONTINUED | OUTPATIENT
Start: 2021-08-01 | End: 2021-08-07 | Stop reason: HOSPADM

## 2021-08-01 RX ORDER — AMOXICILLIN 250 MG
2 CAPSULE ORAL 2 TIMES DAILY
Status: DISCONTINUED | OUTPATIENT
Start: 2021-08-01 | End: 2021-08-07 | Stop reason: HOSPADM

## 2021-08-01 RX ADMIN — CEFAZOLIN SODIUM 1 G: 1 INJECTION, SOLUTION INTRAVENOUS at 09:30

## 2021-08-01 RX ADMIN — TRANEXAMIC ACID 500 MG: 100 INJECTION, SOLUTION INTRAVENOUS at 11:55

## 2021-08-01 RX ADMIN — CLOSTRIDIUM TETANI TOXOID ANTIGEN (FORMALDEHYDE INACTIVATED), CORYNEBACTERIUM DIPHTHERIAE TOXOID ANTIGEN (FORMALDEHYDE INACTIVATED), BORDETELLA PERTUSSIS TOXOID ANTIGEN (GLUTARALDEHYDE INACTIVATED), BORDETELLA PERTUSSIS FILAMENTOUS HEMAGGLUTININ ANTIGEN (FORMALDEHYDE INACTIVATED), BORDETELLA PERTUSSIS PERTACTIN ANTIGEN, AND BORDETELLA PERTUSSIS FIMBRIAE 2/3 ANTIGEN 0.5 ML: 5; 2; 2.5; 5; 3; 5 INJECTION, SUSPENSION INTRAMUSCULAR at 09:58

## 2021-08-01 ASSESSMENT — ENCOUNTER SYMPTOMS
BRUISES/BLEEDS EASILY: 0
NAUSEA: 0
BACK PAIN: 0
SHORTNESS OF BREATH: 0
DIARRHEA: 0
EYE DISCHARGE: 0
CONSTIPATION: 0
MYALGIAS: 0
NECK PAIN: 0
WHEEZING: 0
FEVER: 0
DIZZINESS: 0
SORE THROAT: 0
HEMOPTYSIS: 0
HEADACHES: 0
SPEECH CHANGE: 0
WEAKNESS: 0
CHILLS: 0
LOSS OF CONSCIOUSNESS: 0
FOCAL WEAKNESS: 0
VOMITING: 0
DEPRESSION: 0
ABDOMINAL PAIN: 0
CLAUDICATION: 0
SPUTUM PRODUCTION: 0
SENSORY CHANGE: 0
COUGH: 0
PALPITATIONS: 0
DIAPHORESIS: 0
EYE PAIN: 0

## 2021-08-01 ASSESSMENT — FIBROSIS 4 INDEX
FIB4 SCORE: 6.64
FIB4 SCORE: 2.213176031893565431

## 2021-08-01 ASSESSMENT — LIFESTYLE VARIABLES: SUBSTANCE_ABUSE: 0

## 2021-08-01 ASSESSMENT — PAIN DESCRIPTION - PAIN TYPE: TYPE: ACUTE PAIN

## 2021-08-01 NOTE — PROGRESS NOTES
Assumed care of pt at around 1430 from the ED. Pt a&o x4. Neurologically he is intact. Hematoma noted to left anterior thigh; Dr. Walker made aware and femur XR ordered. ED RN reported she had to have a CNA sit 1:1 d/t pt bleeding so badly from his mouth. In ED, there was a waste basket full of saturated bloody gauze. I made the pt NPO per nursing judgement and made MD aware. While under care of this RN, pt saturating 4x4 gauzes q5 mins.  Oral suctioning at bedside and frequent cleaning/gown changes required.  At 1530, tele called that pt sustaining in 130s. Upon avaluating, pt's gown again covered in blood and he expressed anxiety about it not stopping. (Ice pops have been provided with little to no effect). BP stable and HR in low 110s. Dr. Walker messaged via voalte regarding my concern about the bleeding. She stated to continue to apply cold and that she would order a repeat Hgb. Bed low and locked, alarm set and call bell within reach.  Hourly rounding continues.    1700: Oral bleeding finally stopped; pt remaining NPO to prevent further aggravation of tooth socket though.

## 2021-08-01 NOTE — ASSESSMENT & PLAN NOTE
ERP has spoken with oral surgeon.  Plan is for outpatient evaluation of avulsed maxillary medial incisors as well as evaluation of his mandibular medial incisor loose teeth.  Cold popsicles ordered to control current oozing bleeding.  Placed on puréed thin liquid diet.  Patient has normal speech on exam no obvious aspiration.  8/3- continue ice and supportive measurement

## 2021-08-01 NOTE — ED NOTES
Report received from DASIA Shen. ED Danielle Guerra at bedside holding TXA soaked gauze in area of bleeding on pt's mouth. Bleeding appears to be gradually slowing.

## 2021-08-01 NOTE — PROGRESS NOTES
4 Eyes Skin Assessment Completed by Lesley RN and DASIA Verduzco.    Head Swelling and Redness, scabs, abrasions  Ears WDL  Nose WDL with scabs  Mouth Bleeding from losing multiple teeth (gauze in place and suction at bedside)  Neck WDL  Breast/Chest WDL  Shoulder Blades WDL but bruise/hematoma and abrasion to top of left shoulder  Spine WDL  (R) Arm/Elbow/Hand WDL  (L) Arm/Elbow/Hand WDL  Abdomen WDL  Groin WDL  Scrotum/Coccyx/Buttocks WDL  (R) Leg Bruising and Abrasion  (L) Leg Bruising, Swelling and Abrasion  (R) Heel/Foot/Toe Edema  (L) Heel/Foot/Toe Edema          Devices In Places Tele Box and SCD's      Interventions In Place Pillows    Possible Skin Injury No    Pictures Uploaded Into Epic N/A  Wound Consult Placed N/A  RN Wound Prevention Protocol Ordered No

## 2021-08-01 NOTE — ASSESSMENT & PLAN NOTE
Continue home carbidopa 4 times daily.  Likely his Parkinson's had something to do with his fall today.  No loss of consciousness per patient he states he face planted into the sidewalk where he avulsed his upper teeth.   PT OT evaluations.--placement   No assistive devices at home.  Fall precautions given his acute subdural hematoma.

## 2021-08-01 NOTE — ED NOTES
Med rec complete per [facility]. NKDA. Per pt, brother Florinda manages medications. Called brother, he is not home to review information.       Medication Sig   • gabapentin (NEURONTIN) 600 MG tablet Take 1 tablet by mouth 3 times daily (Patient taking differently: Take 600 mg by mouth 3 times a day.)   • amitriptyline (ELAVIL) 25 MG Tab Take 1 tablet by mouth every day at bedtime   • aspirin 81 MG EC tablet Take 1 tablet by mouth once daily (Patient taking differently: Take 81 mg by mouth every day.)   • metFORMIN (GLUCOPHAGE) 500 MG Tab Take 1 tablet by mouth twice daily (Patient taking differently: Take 500 mg by mouth 2 times a day.)   • rosuvastatin (CRESTOR) 40 MG tablet Take 1 tablet by mouth once daily (Patient taking differently: Take 40 mg by mouth every day.)   • carbidopa-levodopa (SINEMET)  MG Tab Take 1 tablet by mouth 4 times a day   • tamsulosin (FLOMAX) 0.4 MG capsule Take 1 capsule by mouth once daily (Patient taking differently: Take 0.4 mg by mouth every day.)

## 2021-08-01 NOTE — ED PROVIDER NOTES
ED Provider Note    Scribed for Helen Almaguer M.D. by Cathryn Reyes. 8/1/2021  8:59 AM    Primary care provider: Pcp Pt States None  Means of arrival: EMS   History obtained from: patient   History limited by: none    CHIEF COMPLAINT  Chief Complaint   Patient presents with    T-5000 FALL     BIB [facility].  Fell fell outside the [facility].  Face planted and has lost some teeth.  Injury to left shoulder.  No LOC.  Takes a daily 81 mg aspirin       HPI  Dinah Mathur is a 65 y.o. male who presents to the Emergency Department for evaluation after a fall onset this morning. Patient describes he was walking on the side walk when she slipped and fell forward. He has associated left sided facial trauma, broken teeth, and laceration to the left eye. Denies loss of consciousness. Patient has a past medical history of diabetes. Denies alcohol today. He is unaware of his last tetanus.    REVIEW OF SYSTEMS  HEENT: + left sided facial trauma, broken teeth, and laceration to the left eye. No ear pain, congestion, or sore throat   EYES: no discharge, redness, or vision changes  CARDIAC: no chest pain, no palpitations    PULMONARY: no dyspnea, cough, or congestion   GI: no vomiting, diarrhea, or abdominal pain   : no dysuria, back pain, or hematuria   Neuro: no loss of consciousness, weakness, numbness, aphasia, or headache  Musculoskeletal: no swelling, deformity, pain, or joint swelling  Endocrine: no fevers, sweating, or weight loss   SKIN: no rash, erythema, or contusions     See history of present illness. All other systems are negative. C.    PAST MEDICAL HISTORY   has a past medical history of Diabetes ([facility]), Parkinson's disease ([facility]) (04/25/2021), and Parkinson's disease ([facility]).    SURGICAL HISTORY  patient denies any surgical history    SOCIAL HISTORY  Social History     Tobacco Use    Smoking status: Never Smoker    Smokeless tobacco: Never Used   Vaping Use    Vaping Use: Never assessed   Substance Use  "Topics    Alcohol use: Never    Drug use: Never      Social History     Substance and Sexual Activity   Drug Use Never       FAMILY HISTORY  History reviewed. No pertinent family history.    CURRENT MEDICATIONS  Home Medications       Reviewed by [place] (Pharmacy Tech) on 08/01/21 at 1107  Med List Status: Complete     Medication Last Dose Status   amitriptyline (ELAVIL) 25 MG Tab 7/31/2021 Active   aspirin 81 MG EC tablet 7/31/2021 Active   carbidopa-levodopa (SINEMET)  MG Tab 8/1/2021 Active   gabapentin (NEURONTIN) 600 MG tablet 8/1/2021 Active   metFORMIN (GLUCOPHAGE) 500 MG Tab 8/1/2021 Active   rosuvastatin (CRESTOR) 40 MG tablet 7/31/2021 Active   tamsulosin (FLOMAX) 0.4 MG capsule 7/31/2021 Active                    ALLERGIES  No Known Allergies    PHYSICAL EXAM  VITAL SIGNS: /76   Pulse 78   Temp 36.4 °C (97.6 °F) (Temporal)   Resp 16   Ht 1.76 m (5' 9.29\")   Wt 79.4 kg (175 lb)   SpO2 98%   BMI 25.63 kg/m²     Constitutional: Well developed, Well nourished, No acute distress, Non-toxic appearance.   HEENT: Normocephalic, 3 cm laceration below eye, fat lip with loose knocked out teeth, external ears normal, pharynx pink,  Mucous  Membranes moist, No rhinorrhea or mucosal edema  Eyes: PERRL, EOMI, Conjunctiva normal, No discharge.   Neck: Normal range of motion, No tenderness, Supple, No stridor.   Lymphatic: No lymphadenopathy    Cardiovascular: Regular Rate and Rhythm, No murmurs,  rubs, or gallops.   Thorax & Lungs: Lungs clear to auscultation bilaterally, No respiratory distress, No wheezes, rhales or rhonchi, No chest wall tenderness.   Abdomen: Bowel sounds normal, Soft, non tender, non distended,  No pulsatile masses., no rebound guarding or peritoneal signs.   Skin: Warm, Dry, No erythema, No rash,   Back:  No CVA tenderness,  No spinal tenderness, bony crepitance, step offs, or instability.   Neurologic: Alert & oriented clear speech no focal deficits, left arm " weakness, but patient states this is chronic.  Extremities: Equal, intact distal pulses, No cyanosis, clubbing or edema,  No tenderness. abrasion left shoulder  Musculoskeletal: Good range of motion in all major joints. No tenderness to palpation or major deformities noted.     DIAGNOSTIC STUDIES / PROCEDURES    LABS  Results for orders placed or performed during the hospital encounter of 08/01/21   CBC WITH DIFFERENTIAL   Result Value Ref Range    WBC 8.1 4.8 - 10.8 K/uL    RBC 5.78 4.70 - 6.10 M/uL    Hemoglobin 16.0 14.0 - 18.0 g/dL    Hematocrit 50.6 42.0 - 52.0 %    MCV 87.5 81.4 - 97.8 fL    MCH 27.7 27.0 - 33.0 pg    MCHC 31.6 (L) 33.7 - 35.3 g/dL    RDW 44.0 35.9 - 50.0 fL    Platelet Count 195 164 - 446 K/uL    MPV 9.6 9.0 - 12.9 fL    Neutrophils-Polys 72.00 44.00 - 72.00 %    Lymphocytes 17.00 (L) 22.00 - 41.00 %    Monocytes 6.10 0.00 - 13.40 %    Eosinophils 3.80 0.00 - 6.90 %    Basophils 0.90 0.00 - 1.80 %    Immature Granulocytes 0.20 0.00 - 0.90 %    Nucleated RBC 0.00 /100 WBC    Neutrophils (Absolute) 5.81 1.82 - 7.42 K/uL    Lymphs (Absolute) 1.37 1.00 - 4.80 K/uL    Monos (Absolute) 0.49 0.00 - 0.85 K/uL    Eos (Absolute) 0.31 0.00 - 0.51 K/uL    Baso (Absolute) 0.07 0.00 - 0.12 K/uL    Immature Granulocytes (abs) 0.02 0.00 - 0.11 K/uL    NRBC (Absolute) 0.00 K/uL   PROTHROMBIN TIME   Result Value Ref Range    PT 12.8 12.0 - 14.6 sec    INR 0.99 0.87 - 1.13   APTT   Result Value Ref Range    APTT 32.5 24.7 - 36.0 sec   COMP METABOLIC PANEL   Result Value Ref Range    Sodium 141 135 - 145 mmol/L    Potassium 4.4 3.6 - 5.5 mmol/L    Chloride 103 96 - 112 mmol/L    Co2 26 20 - 33 mmol/L    Anion Gap 12.0 7.0 - 16.0    Glucose 147 (H) 65 - 99 mg/dL    Bun 27 (H) 8 - 22 mg/dL    Creatinine 1.10 0.50 - 1.40 mg/dL    Calcium 9.4 8.5 - 10.5 mg/dL    AST(SGOT) 23 12 - 45 U/L    ALT(SGPT) 12 2 - 50 U/L    Alkaline Phosphatase 109 (H) 30 - 99 U/L    Total Bilirubin 0.3 0.1 - 1.5 mg/dL    Albumin 4.8 3.2 -  4.9 g/dL    Total Protein 8.2 6.0 - 8.2 g/dL    Globulin 3.4 1.9 - 3.5 g/dL    A-G Ratio 1.4 g/dL   ESTIMATED GFR   Result Value Ref Range    GFR If African American >60 >60 mL/min/1.73 m 2    GFR If Non African American >60 >60 mL/min/1.73 m 2   EKG (NOW)   Result Value Ref Range    Report       [facility] Emergency Dept.    Test Date:  2021  Pt Name:    ALBERT VEGA             Department: ER  MRN:        [id number]                      Room:       RD 01  Gender:     Male                         Technician: [id number]  :        1955                   Requested By:MI VASQUES  Order #:    [id number]                    Reading MD: MI VASQUES MD    Measurements  Intervals                                Axis  Rate:       70                           P:  DE:                                      QRS:        68  QRSD:       94                           T:          40  QT:         392  QTc:        423    Interpretive Statements  ATRIAL FIBRILLATION  BORDERLINE LOW VOLTAGE IN FRONTAL LEADS  BASELINE WANDER IN LEAD(S) V6  Compared to ECG 2021 08:23:17  Sinus rhythm no longer present  Intraventricular conduction delay no longer present  Electronically Signed On 2021 10:15:52 PDT by MI VASQUES MD        All labs reviewed by me.    EKG  12 lead EKG; Interpreted by emergency department physician as above    RADIOLOGY  DX-SHOULDER 2+ LEFT   Final Result      1.  No acute left shoulder fracture or dislocation.      2.  Moderate degenerative change of the left AC joint.      CT-HEAD W/O   Final Result      1.  Small left parafalcine subdural hematoma in the left parietal region.      2.  No mass effect.      3.  No calvarial fracture.      Findings were discussed with MI VASQUES M.D. on 2021 9:56 AM.      CT-MAXILLOFACIAL W/O PLUS RECONS   Final Result         1.  Multiple missing anterior left-sided maxillary teeth with overlying soft tissue hematoma.      2.  No other  maxillary fracture.      3.  No mandibular fracture or TMJ dislocation.      4.  Otherwise negative CT examination of the maxillofacial bones.      5.  Findings were discussed with MI VASQUES M.D. on 8/1/2021 9:55 AM.         CT-HEAD W/O    (Results Pending)   DX-FEMUR-2+ LEFT    (Results Pending)     The radiologist's interpretation of all radiological studies have been reviewed by me.    COURSE & MEDICAL DECISION MAKING  Nursing notes, VS, PMSFHx reviewed in chart.    8:59 AM Patient seen and examined at bedside. Paged to patient room for a code TBI.  I discussed that we will obtain labs and imaging to further evaluate for a cause of his symptoms. He will be treated with antibiotics secondary to his facial trauma nad we will update his tetanus. Patient will be treated with Ancef 1 g and Adacel. Ordered Ct head, CT maxillofacial, CBC w/ diff, CMP, PT, APTT, and EKG to evaluate his symptoms. The differential diagnoses include but are not limited to: Facial fracture, intracranial hemorrhage, arrhythmia, anemia    10:10 AM Spoke to radiology guarding the patients CT who informed me the patient has a small subdural. Spoke to Dr. Cadena (trauma) who recommended admission to medicine. Paged neurosurgery.    10:26 AM I discussed the patient's case and the above findings with Dr. Rosario (neurosurgery) who recommended admission to medicine. Paged trauma to consult on the patient.     10:29 AM I discussed the patient's case and the above findings with Dr. Barber (trauma) who does not think the patient needs a trauma consult. Paged hospitalist for admission.     10:30 AM Updated the patient and his brother on the patients care. Patient verbalizes understanding and agreement to this plan of care. Patients brother notes that he is his brothers care taker and is concerned for his ability to care for himself. I discussed that while in the hospital they can give further resources to help the family. Paged oral surgery to  consult on the patients dental trauma.    10:56 AM I discussed the patient's case and the above findings with Dr. Mi (facial trauma) who will follow up with the patient as an outpatient.    11:04 AM I discussed the patient's case and the above findings with Dr. Walker (Hospitalist) who agreed to evaluate patient for hospitalization. Patients care will be transferred at this time.     11:57 AM Patient is bleeding from his gums where the teeth are missing. We will place TXA soaked gauze on the area's of bleeding to help control it.    DISPOSITION:  Patient will be hospitalized by Dr. Walker in guarded condition.    FINAL IMPRESSION  1. Fall, initial encounter    2. Post-traumatic subdural hematoma, without loss of consciousness, initial encounter ([facility])    3. Tooth avulsion, initial encounter          Cathryn CESAR (Scribfreddy), am scribing for, and in the presence of, Helen Almaguer M.D..    Electronically signed by: Cathryn Reyes (Blakeibfreddy), 8/1/2021    IHelen M.D. personally performed the services described in this documentation, as scribed by Cathryn Reyes in my presence, and it is both accurate and complete. C    The note accurately reflects work and decisions made by me.  Helen Almaguer M.D.  8/1/2021  3:02 PM

## 2021-08-01 NOTE — H&P
Hospital Medicine History & Physical Note    Date of Service  8/1/2021    Primary Care Physician  Pcp Pt States None    Consultants  neurosurgery, trauma surgery and oral surgeon    Specialist Names: Dr. Rosario, Dr. Easley, Dr. Mi:  All phone consults by ERP.    Code Status  Full Code    Chief Complaint  Chief Complaint   Patient presents with   • T-5000 FALL     BIB [facility].  Fell fell outside the [facility].  Face planted and has lost some teeth.  Injury to left shoulder.  No LOC.  Takes a daily 81 mg aspirin       History of Presenting Illness  Dinah Mathur is a 65 y.o. male who presented 8/1/2021 with history of Parkinson's disease, type 2 diabetes mellitus, recent new onset dementia with baseline confusion and wandering per caretaker brother, patient states he fell on sidewalk and hit his upper teeth on the sidewalk causing them to be avulsed.  This apparently occurred while the caretaker brother went to [employer] this morning and left the patient alone.  The patient does not use any assistive walking device.  He is not sure if he tripped on something or not.  The patient is alert and oriented English-speaking without focal neurological deficits.  He has active bleeding from his maxillary medial incisor avulsions complete.  CT head with left parafalcine small subdural hematoma noted left parietal.  CT maxillofacial negative for fractures other than avulsed left maxillary teeth.  Left shoulder x-ray pending.  EKG with normal sinus rhythm although a lot of artifact noted from his Parkinson's.  ERP has spoken with trauma neurosurgery as well as maxillofacial oral surgeon.  No acute need for surgical intervention.  Maxillofacial will follow-up as an outpatient.  I have ordered a puréed diet.  He is eating.  I have ordered cold popsicles to stop the avulsed upper teeth bleeding.  Repeat CT head in 6 hours.  Neurochecks every 4 hour for next 24 hours.  Have ordered PT OT consultation and fall  precautions.    I discussed the plan of care with patient and bedside RN.    Review of Systems  Review of Systems   Constitutional: Negative for chills, diaphoresis, fever and malaise/fatigue.   HENT: Negative for congestion and sore throat.         Mouth pain from avulsion maxillary medial incisors   Eyes: Negative for pain and discharge.   Respiratory: Negative for cough, hemoptysis, sputum production, shortness of breath and wheezing.    Cardiovascular: Negative for chest pain, palpitations, claudication and leg swelling.   Gastrointestinal: Negative for abdominal pain, constipation, diarrhea, melena, nausea and vomiting.   Genitourinary: Negative for dysuria, frequency and urgency.   Musculoskeletal: Negative for back pain, joint pain, myalgias and neck pain.   Skin: Negative for itching and rash.   Neurological: Negative for dizziness, sensory change, speech change, focal weakness, loss of consciousness, weakness and headaches.   Endo/Heme/Allergies: Does not bruise/bleed easily.   Psychiatric/Behavioral: Negative for depression, substance abuse and suicidal ideas.       Past Medical History   has a past medical history of Diabetes ([facility]), Parkinson's disease ([facility]) (04/25/2021), and Parkinson's disease ([facility]).  BPH and hyperlipidemia    Surgical History   has no past surgical history on file.     Family History  family history is not on file.   Family history reviewed with patient. There is no family history that is pertinent to the chief complaint.     Social History   reports that he has never smoked. He has never used smokeless tobacco. He reports that he does not drink alcohol and does not use drugs.  Lives with brother 24/7.  However brother left for 1 hour to go to [employer] today and patient wandered off.  Per brother he has a tendency for wandering and recently has had decline in mentation.    Allergies  No Known Allergies    Medications  Prior to Admission Medications   Prescriptions Last Dose Informant  Patient Reported? Taking?   amitriptyline (ELAVIL) 25 MG Tab 7/31/2021 at Unknown time  No No   Sig: Take 1 tablet by mouth every day at bedtime   aspirin 81 MG EC tablet 7/31/2021 at Unknown time  No No   Sig: Take 1 tablet by mouth once daily   Patient taking differently: Take 81 mg by mouth every day.   carbidopa-levodopa (SINEMET)  MG Tab 8/1/2021 at 0600  No No   Sig: Take 1 tablet by mouth 4 times a day   gabapentin (NEURONTIN) 600 MG tablet 8/1/2021 at 0600  No No   Sig: Take 1 tablet by mouth 3 times daily   Patient taking differently: Take 600 mg by mouth 3 times a day.   metFORMIN (GLUCOPHAGE) 500 MG Tab 8/1/2021 at 0600  No No   Sig: Take 1 tablet by mouth twice daily   Patient taking differently: Take 500 mg by mouth 2 times a day.   rosuvastatin (CRESTOR) 40 MG tablet 7/31/2021 at Unknown time  No No   Sig: Take 1 tablet by mouth once daily   Patient taking differently: Take 40 mg by mouth every day.   tamsulosin (FLOMAX) 0.4 MG capsule 7/31/2021 at Unknown time  No No   Sig: Take 1 capsule by mouth once daily   Patient taking differently: Take 0.4 mg by mouth every day.      Facility-Administered Medications: None       Physical Exam  Temp:  [36.4 °C (97.6 °F)] 36.4 °C (97.6 °F)  Pulse:  [65-79] 65  Resp:  [16] 16  BP: (122-168)/(76-81) 122/81  SpO2:  [94 %-98 %] 94 %    Physical Exam  Vitals and nursing note reviewed.   Constitutional:       General: He is not in acute distress.     Appearance: Normal appearance. He is normal weight. He is diaphoretic.   HENT:      Head: Normocephalic and atraumatic.      Mouth/Throat:      Pharynx: No oropharyngeal exudate.      Comments: Positive maxillary medial incisors bilaterally completely avulsed and missing.  Active bleeding from sockets.  Mandibular medial incisor loose, but fixed in socket.  Eyes:      General: No scleral icterus.        Right eye: No discharge.         Left eye: No discharge.      Conjunctiva/sclera: Conjunctivae normal.      Pupils:  Pupils are equal, round, and reactive to light.   Neck:      Thyroid: No thyromegaly.      Vascular: No JVD.      Trachea: No tracheal deviation.   Cardiovascular:      Rate and Rhythm: Normal rate and regular rhythm.      Heart sounds: Normal heart sounds. No murmur heard.   No friction rub. No gallop.    Pulmonary:      Effort: Pulmonary effort is normal. No respiratory distress.      Breath sounds: Normal breath sounds. No wheezing or rales.   Chest:      Chest wall: No tenderness.   Abdominal:      General: Bowel sounds are normal. There is no distension.      Palpations: Abdomen is soft. There is no mass.      Tenderness: There is no abdominal tenderness. There is no guarding or rebound.   Musculoskeletal:         General: Tenderness present. Normal range of motion.      Cervical back: Normal range of motion and neck supple.   Lymphadenopathy:      Cervical: No cervical adenopathy.   Skin:     General: Skin is warm.      Findings: No erythema or rash.   Neurological:      General: No focal deficit present.      Mental Status: He is alert and oriented to person, place, and time. Mental status is at baseline.      Cranial Nerves: No cranial nerve deficit.      Sensory: No sensory deficit.      Motor: No weakness or abnormal muscle tone.      Deep Tendon Reflexes: Reflexes normal.   Psychiatric:         Mood and Affect: Mood normal.         Behavior: Behavior normal.         Thought Content: Thought content normal.         Judgment: Judgment normal.         Laboratory:  Recent Labs     08/01/21  0902   WBC 8.1   RBC 5.78   HEMOGLOBIN 16.0   HEMATOCRIT 50.6   MCV 87.5   MCH 27.7   MCHC 31.6*   RDW 44.0   PLATELETCT 195   MPV 9.6     Recent Labs     08/01/21 0902   SODIUM 141   POTASSIUM 4.4   CHLORIDE 103   CO2 26   GLUCOSE 147*   BUN 27*   CREATININE 1.10   CALCIUM 9.4     Recent Labs     08/01/21  0902   ALTSGPT 12   ASTSGOT 23   ALKPHOSPHAT 109*   TBILIRUBIN 0.3   GLUCOSE 147*     Recent Labs     08/01/21  0902    APTT 32.5   INR 0.99     No results for input(s): NTPROBNP in the last 72 hours.      No results for input(s): TROPONINT in the last 72 hours.    Imaging:  CT-HEAD W/O   Final Result      1.  Small left parafalcine subdural hematoma in the left parietal region.      2.  No mass effect.      3.  No calvarial fracture.      Findings were discussed with MI VASQUES M.D. on 8/1/2021 9:56 AM.      CT-MAXILLOFACIAL W/O PLUS RECONS   Final Result         1.  Multiple missing anterior left-sided maxillary teeth with overlying soft tissue hematoma.      2.  No other maxillary fracture.      3.  No mandibular fracture or TMJ dislocation.      4.  Otherwise negative CT examination of the maxillofacial bones.      5.  Findings were discussed with MI VASQUES M.D. on 8/1/2021 9:55 AM.         CT-HEAD W/O    (Results Pending)   DX-SHOULDER 2+ LEFT    (Results Pending)       X-Ray:  I have personally reviewed the images and compared with prior images. and My impression is: CT head with small left parietal SDH  EKG:  I have personally reviewed the images and compared with prior images. and My impression is: NSR, p waves present, R waves regular.  motion artifact from Parkinson's  Rate 70 QTc 423    Assessment/Plan:  I anticipate this patient will require at least two midnights for appropriate medical management, necessitating inpatient admission.    HLD (hyperlipidemia)- (present on admission)  Assessment & Plan  Continue home crestor.  Check lipid panel in a.m.    Benign prostatic hyperplasia without lower urinary tract symptoms- (present on admission)  Assessment & Plan  Continue home Flomax.  BladderScan as needed for inability to void.    SDH (subdural hematoma) (HCC)- (present on admission)  Assessment & Plan  Secondary to trauma.  CT head with left parafalcine small subdural hematoma left parietal.  Neurochecks every 1 hour.  Admitted to neurology unit.  Repeat CT head in 6 hours to ensure stability.  Patient takes  aspirin 81 mg p.o. daily but no other blood thinners.  Avoid DVT prophylaxis chemically.  SCDs only.  No obvious focal neurological deficit noted on exam.  ERP has discussed with neurosurgeon on-call as well as trauma surgeon.  Both state medical admission.      Tooth avulsion- (present on admission)  Assessment & Plan  ERP has spoken with oral surgeon.  Plan is for outpatient evaluation of avulsed maxillary medial incisors as well as evaluation of his mandibular medial incisor loose teeth.  Cold popsicles ordered to control current oozing bleeding.  Placed on puréed thin liquid diet.  Patient has normal speech on exam no obvious aspiration.    Parkinson disease (HCC)- (present on admission)  Assessment & Plan  Continue home carbidopa 4 times daily.  Likely his Parkinson's had something to do with his fall today.  No loss of consciousness per patient he states he face planted into the sidewalk where he avulsed his upper teeth.  I have ordered PT OT evaluations.  No assistive devices at home.  Fall precautions given his acute subdural hematoma.    Osteoarthritis of both hands- (present on admission)  Assessment & Plan  History of.    Type 2 diabetes mellitus, without long-term current use of insulin ([facility])- (present on admission)  Assessment & Plan  Check a hemoglobin A1c  Consistent carbohydrate diet  Patient is able to tolerate oral diet.  Started on his home Metformin 500 twice daily with meals.  It does not appear the patient was hypoglycemic as cause of his fall.        VTE prophylaxis: SCDs/TEDs

## 2021-08-01 NOTE — CARE PLAN
The patient is Watcher - Medium risk of patient condition declining or worsening    Shift Goals  Clinical Goals: Bleeding in mouth to stop  Patient Goals: Bleeding in mouth to stop  Family Goals: ALTAF    Progress made toward(s) clinical / shift goals:    Problem: Skin Integrity  Goal: Skin integrity is maintained or improved  Outcome: Progressing  Pt repositions self in bed; encouraged to continue to.     Problem: Fall Risk  Goal: Patient will remain free from falls  Outcome: Progressing  Bed low and locked, alarm set, call bell within reach. Hourly rounding continues.       Patient is not progressing towards the following goals: N/A

## 2021-08-01 NOTE — ASSESSMENT & PLAN NOTE
Secondary to trauma.  CT head with left parafalcine small subdural hematoma left parietal.  Neurochecks every 1 hour.  Admitted to neurology unit.  Repeat CT head in 6 hours to ensure stability.  Patient takes aspirin 81 mg p.o. daily but no other blood thinners.  Avoid DVT prophylaxis chemically.  SCDs only.  No obvious focal neurological deficit noted on exam.  ERP has discussed with neurosurgeon on-call as well as trauma surgeon.  Both state medical admission.  8/3- remain stable. No changes. Medical cleared for rehab   8/5- pt not safe to be discharge on his own. No benefits from SNF. Transition home on Saturday

## 2021-08-01 NOTE — PROGRESS NOTES
Trauma staff.    TBI alert upgraded to trauma yellow AFTER I spoke to Dr. Almaguer and outlined a comprehensive care plan for patient.   GLF, minor TBI. Neurosurgery consulted.   Admit to medicine per established guideline.   Please call with questions.     Paxton Barber MD  [phone]

## 2021-08-01 NOTE — ED NOTES
Neuro floor contacted - bed ready for pt at 185-02. No RN assigned for the room at this time - floor to call this RN back when bed/RN ready for report & transfer.

## 2021-08-01 NOTE — ED TRIAGE NOTES
Chief Complaint   Patient presents with   • T-5000 FALL     BIB [facility].  Fell fell outside the [employer].  Face planted and has lost some teeth.  Injury to left shoulder.  No LOC.  Takes a daily 81 mg aspirin   Pt is alert, a lot of bleeding from mouth.  Weakness of left arm-states is not new. (Since last fall).

## 2021-08-01 NOTE — ASSESSMENT & PLAN NOTE
A1c 6.7  Consistent carbohydrate diet  Patient is able to tolerate oral diet.  Started on his home Metformin 500 twice daily with meals.  It does not appear the patient was hypoglycemic as cause of his fall.

## 2021-08-02 LAB
ANION GAP SERPL CALC-SCNC: 13 MMOL/L (ref 7–16)
BASOPHILS # BLD AUTO: 1.2 % (ref 0–1.8)
BASOPHILS # BLD: 0.12 K/UL (ref 0–0.12)
BUN SERPL-MCNC: 32 MG/DL (ref 8–22)
CALCIUM SERPL-MCNC: 9.3 MG/DL (ref 8.5–10.5)
CHLORIDE SERPL-SCNC: 105 MMOL/L (ref 96–112)
CHOLEST SERPL-MCNC: 85 MG/DL (ref 100–199)
CO2 SERPL-SCNC: 22 MMOL/L (ref 20–33)
CREAT SERPL-MCNC: 0.9 MG/DL (ref 0.5–1.4)
EOSINOPHIL # BLD AUTO: 0.22 K/UL (ref 0–0.51)
EOSINOPHIL NFR BLD: 2.2 % (ref 0–6.9)
ERYTHROCYTE [DISTWIDTH] IN BLOOD BY AUTOMATED COUNT: 43.1 FL (ref 35.9–50)
EST. AVERAGE GLUCOSE BLD GHB EST-MCNC: 146 MG/DL
GLUCOSE BLD-MCNC: 112 MG/DL (ref 65–99)
GLUCOSE BLD-MCNC: 117 MG/DL (ref 65–99)
GLUCOSE BLD-MCNC: 127 MG/DL (ref 65–99)
GLUCOSE BLD-MCNC: 209 MG/DL (ref 65–99)
GLUCOSE SERPL-MCNC: 136 MG/DL (ref 65–99)
HBA1C MFR BLD: 6.7 % (ref 4–5.6)
HCT VFR BLD AUTO: 44 % (ref 42–52)
HDLC SERPL-MCNC: 25 MG/DL
HGB BLD-MCNC: 14.3 G/DL (ref 14–18)
IMM GRANULOCYTES # BLD AUTO: 0.03 K/UL (ref 0–0.11)
IMM GRANULOCYTES NFR BLD AUTO: 0.3 % (ref 0–0.9)
LDLC SERPL CALC-MCNC: 34 MG/DL
LYMPHOCYTES # BLD AUTO: 2.24 K/UL (ref 1–4.8)
LYMPHOCYTES NFR BLD: 22.1 % (ref 22–41)
MCH RBC QN AUTO: 27.9 PG (ref 27–33)
MCHC RBC AUTO-ENTMCNC: 32.5 G/DL (ref 33.7–35.3)
MCV RBC AUTO: 85.8 FL (ref 81.4–97.8)
MONOCYTES # BLD AUTO: 1.17 K/UL (ref 0–0.85)
MONOCYTES NFR BLD AUTO: 11.5 % (ref 0–13.4)
NEUTROPHILS # BLD AUTO: 6.35 K/UL (ref 1.82–7.42)
NEUTROPHILS NFR BLD: 62.7 % (ref 44–72)
NRBC # BLD AUTO: 0 K/UL
NRBC BLD-RTO: 0 /100 WBC
PLATELET # BLD AUTO: 185 K/UL (ref 164–446)
PMV BLD AUTO: 9.8 FL (ref 9–12.9)
POTASSIUM SERPL-SCNC: 3.6 MMOL/L (ref 3.6–5.5)
RBC # BLD AUTO: 5.13 M/UL (ref 4.7–6.1)
SODIUM SERPL-SCNC: 140 MMOL/L (ref 135–145)
TRIGL SERPL-MCNC: 130 MG/DL (ref 0–149)
TSH SERPL DL<=0.005 MIU/L-ACNC: 1.55 UIU/ML (ref 0.38–5.33)
VIT B12 SERPL-MCNC: 374 PG/ML (ref 211–911)
WBC # BLD AUTO: 10.1 K/UL (ref 4.8–10.8)

## 2021-08-02 PROCEDURE — 80061 LIPID PANEL: CPT

## 2021-08-02 PROCEDURE — 83036 HEMOGLOBIN GLYCOSYLATED A1C: CPT

## 2021-08-02 PROCEDURE — A9270 NON-COVERED ITEM OR SERVICE: HCPCS | Performed by: HOSPITALIST

## 2021-08-02 PROCEDURE — 85025 COMPLETE CBC W/AUTO DIFF WBC: CPT

## 2021-08-02 PROCEDURE — 82962 GLUCOSE BLOOD TEST: CPT

## 2021-08-02 PROCEDURE — 97162 PT EVAL MOD COMPLEX 30 MIN: CPT

## 2021-08-02 PROCEDURE — 36415 COLL VENOUS BLD VENIPUNCTURE: CPT

## 2021-08-02 PROCEDURE — 99233 SBSQ HOSP IP/OBS HIGH 50: CPT | Performed by: HOSPITALIST

## 2021-08-02 PROCEDURE — 97166 OT EVAL MOD COMPLEX 45 MIN: CPT

## 2021-08-02 PROCEDURE — 82607 VITAMIN B-12: CPT

## 2021-08-02 PROCEDURE — 84443 ASSAY THYROID STIM HORMONE: CPT

## 2021-08-02 PROCEDURE — 700102 HCHG RX REV CODE 250 W/ 637 OVERRIDE(OP): Performed by: HOSPITALIST

## 2021-08-02 PROCEDURE — 92610 EVALUATE SWALLOWING FUNCTION: CPT

## 2021-08-02 PROCEDURE — 770006 HCHG ROOM/CARE - MED/SURG/GYN SEMI*

## 2021-08-02 PROCEDURE — 80048 BASIC METABOLIC PNL TOTAL CA: CPT

## 2021-08-02 PROCEDURE — 770020 HCHG ROOM/CARE - TELE (206)

## 2021-08-02 RX ADMIN — INSULIN HUMAN 2 UNITS: 100 INJECTION, SOLUTION PARENTERAL at 12:17

## 2021-08-02 RX ADMIN — DOCUSATE SODIUM 50 MG AND SENNOSIDES 8.6 MG 2 TABLET: 8.6; 5 TABLET, FILM COATED ORAL at 17:35

## 2021-08-02 RX ADMIN — CARBIDOPA AND LEVODOPA 1 TABLET: 25; 250 TABLET ORAL at 12:18

## 2021-08-02 RX ADMIN — CARBIDOPA AND LEVODOPA 1 TABLET: 25; 250 TABLET ORAL at 17:35

## 2021-08-02 RX ADMIN — METFORMIN HYDROCHLORIDE 500 MG: 500 TABLET ORAL at 17:35

## 2021-08-02 RX ADMIN — AMITRIPTYLINE HYDROCHLORIDE 25 MG: 25 TABLET, FILM COATED ORAL at 17:34

## 2021-08-02 ASSESSMENT — GAIT ASSESSMENTS
DEVIATION: BRADYKINETIC;DECREASED BASE OF SUPPORT
DISTANCE (FEET): 5
GAIT LEVEL OF ASSIST: MINIMAL ASSIST
ASSISTIVE DEVICE: FRONT WHEEL WALKER
DISTANCE (FEET): 10

## 2021-08-02 ASSESSMENT — ACTIVITIES OF DAILY LIVING (ADL): TOILETING: DEPENDENT

## 2021-08-02 ASSESSMENT — COGNITIVE AND FUNCTIONAL STATUS - GENERAL
SUGGESTED CMS G CODE MODIFIER MOBILITY: CK
MOVING TO AND FROM BED TO CHAIR: A LITTLE
PERSONAL GROOMING: A LITTLE
CLIMB 3 TO 5 STEPS WITH RAILING: A LOT
WALKING IN HOSPITAL ROOM: A LOT
TOILETING: A LOT
DRESSING REGULAR UPPER BODY CLOTHING: A LITTLE
MOVING FROM LYING ON BACK TO SITTING ON SIDE OF FLAT BED: A LITTLE
DAILY ACTIVITIY SCORE: 15
MOBILITY SCORE: 16
DRESSING REGULAR LOWER BODY CLOTHING: A LOT
EATING MEALS: A LITTLE
SUGGESTED CMS G CODE MODIFIER DAILY ACTIVITY: CK
TURNING FROM BACK TO SIDE WHILE IN FLAT BAD: A LITTLE
STANDING UP FROM CHAIR USING ARMS: A LITTLE
HELP NEEDED FOR BATHING: A LOT

## 2021-08-02 NOTE — THERAPY
Physical Therapy   Initial Evaluation     Patient Name: Dinah Mathur  Age:  65 y.o., Sex:  male  Medical Record #: [id number]  Today's Date: 8/2/2021     Precautions: (P) Fall Risk    Assessment  Patient is 65 y.o. male admitted s/p GLF CT indicative of  left parafalcine small subdural hematoma and avulsed L maxillary teeth. Patient presents to PT evaluation with LUE pain, generalized weakness, impaired balance, impaired coordination, decreased activity tolerance and facial edema. Patient req modA for STS with FWW and Judie for x5' ambulation with FWW, however fatiguing quickly asking to sit in chair. Patient is below baseline and will benefit from skilled IPPT in house. Ideally patient will go home with 24/7 support, however current functional status recommend placement.    Plan    Recommend Physical Therapy 3 times per week until therapy goals are met for the following treatments:  Bed Mobility, Gait Training and Therapeutic Activities    DC Equipment Recommendations: (P) Unable to determine at this time  Discharge Recommendations: (P) Recommend post-acute placement for additional physical therapy services prior to discharge home       Objective       08/02/21 1215   Prior Living Situation   Housing / Facility 1 Story Apartment / Condo   Steps Into Home 0   Steps In Home 0   Equipment Owned Front-Wheel Walker;Raised Toilet Seat With Arms   Lives with - Patient's Self Care Capacity Other (Comments)  (brother)   Comments lives with brother who can assist PRN   Prior Level of Functional Mobility   Bed Mobility Independent   Transfer Status Independent   Ambulation Independent   Distance Ambulation (Feet)   (community)   Assistive Devices Used Front-Wheel Walker   Comments assist with IADLs, IND with ADLs   Gait Analysis   Gait Level Of Assist Minimal Assist   Assistive Device Front Wheel Walker   Distance (Feet) 5   # of Times Distance was Traveled 1   Deviation Bradykinetic;Decreased Base Of Support   Bed Mobility     Comments   (sitting in chair upon arrival/exit)   Functional Mobility   Sit to Stand Moderate Assist   Bed, Chair, Wheelchair Transfer Minimal Assist   Transfer Method Stand Step   Mobility w/ FWW   Short Term Goals    Short Term Goal # 1 in 6 visits pt to demo bed mobility SPV in order to increase fxnal IND   Short Term Goal # 2 in 6 visits pt to demo transfers SPV with LRAD in order to increase fxnal IND   Short Term Goal # 3 in 6 visits pt to demo ambulation x150' with LRAD in order to increase fxnal IND   Anticipated Discharge Equipment and Recommendations   DC Equipment Recommendations Unable to determine at this time   Discharge Recommendations Recommend post-acute placement for additional physical therapy services prior to discharge home

## 2021-08-02 NOTE — THERAPY
"Occupational Therapy   Initial Evaluation     Patient Name: Dinah Mathur  Age:  65 y.o., Sex:  male  Medical Record #: [id number]  Today's Date: 8/2/2021     Precautions  Precautions: Fall Risk  Comments: new dx of dementia    Assessment  Patient is 65 y.o. male who presents to acute OT w/ SDH. Pt demo'd functional mobility w/ Min-Mod Assist and toileting w/ Mod Assist. During functional mobility, pt required minimal cues for sequencing w/ FWW, however demonstrated good reasoning skills by maneuvering around the room and bathroom. Pt reports that his brother assists w/ majority of the BADLs and all IADLs at home. Pt is limited by pain, gen. Weakness, and decreased functional mobility, and activity tolerance. Will continue to follow while in house.      Plan    Recommend Occupational Therapy 3 times per week until therapy goals are met for the following treatments:  Adaptive Equipment, Neuro Re-Education / Balance, Self Care/Activities of Daily Living, Therapeutic Activities and Therapeutic Exercises.    DC Equipment Recommendations: Unable to determine at this time  Discharge Recommendations: Other - (If brother is unable to provide 24/7 care at current level of assist, then post-acute placement)     Subjective    \"I am from [facility] and came here in 1972\"     Objective       08/02/21 0906   Total Time Spent   Total Time Spent (Mins) 31   Initial Contact Note    Initial Contact Note Order Received and Verified, Occupational Therapy Evaluation in Progress with Full Report to Follow.   Prior Living Situation   Prior Services Continuous (24 Hour) Care Giving Family;Other (Comments)  (Pt reports [facility] visit home )   Housing / Facility 1 Story House   Equipment Owned Front-Wheel Walker;Raised Toilet Seat With Arms   Lives with - Patient's Self Care Capacity Other (Comments)   Comments Pt reports his brother assists w/ majority of ADLs (g/h, bathing, LB dressing, and toileting) and all IADLs.   Prior Level of ADL Function "   Self Feeding Independent   Grooming / Hygiene Dependent   Bathing Dependent   Dressing Dependent   Toileting Dependent   Prior Level of IADL Function   Medication Management Dependent   Laundry Dependent   Kitchen Mobility Dependent   Finances Dependent   Home Management Dependent   Shopping Dependent   Prior Level Of Mobility Supervision With Device in Community;Supervision With Device in Home   Precautions   Precautions Fall Risk   Comments new dx of dementia   Pain 0 - 10 Group   Therapist Pain Assessment Post Activity Pain Same as Prior to Activity;Nurse Notified;During Activity  (c/o pain in back and oral area)   Cognition    Cognition / Consciousness X   Level of Consciousness Alert   Safety Awareness Impaired   New Learning Impaired   Attention Impaired   Sequencing Impaired   Comments pleasant and cooperative. pt required intermittent cues for sequencing, however demonstrated good problem solving skills during functional mobility.   Balance Assessment   Sitting Balance (Static) Fair +   Sitting Balance (Dynamic) Fair   Standing Balance (Static) Fair   Standing Balance (Dynamic) Fair -   Weight Shift Sitting Fair   Weight Shift Standing Fair   Comments w/ FWW   Bed Mobility    Supine to Sit Minimal Assist   Sit to Supine   (up in chair post)   Scooting Minimal Assist   Rolling Minimal Assist to Rt.   Comments hob flat, use of hand rails   ADL Assessment   Grooming Seated;Minimal Assist  (face wash)   Upper Body Dressing Minimal Assist   Lower Body Dressing Maximal Assist  (desmond socks)   Toileting Moderate Assist   How much help from another person does the patient currently need...   Putting on and taking off regular lower body clothing? 2   Bathing (including washing, rinsing, and drying)? 2   Toileting, which includes using a toilet, bedpan, or urinal? 2   Putting on and taking off regular upper body clothing? 3   Taking care of personal grooming such as brushing teeth? 3   Eating meals? 3   6 Clicks  Daily Activity Score 15   Functional Mobility   Sit to Stand Minimal Assist   Bed, Chair, Wheelchair Transfer Minimal Assist   Toilet Transfers Moderate Assist  (pt reports toilet is lower than one at home)   Transfer Method Stand Step   Mobility w/in room and bathroom   Distance (Feet) 10   # of Times Distance was Traveled 2   Comments w/ FWW   Activity Tolerance   Sitting in Chair 8 min   Sitting Edge of Bed 10 min   Standing 6 min   Comments limited d/t gen. weakness   Patient / Family Goals   Patient / Family Goal #1 to get better   Short Term Goals   Short Term Goal # 1 pt will demo toileting w/ min assist and no c/o pain   Short Term Goal # 2 pt will demo functional txf's w/ spv and use of AD   Short Term Goal # 3 pt will demo standing g/h ~8 min w/ min assist and AD   Education Group   Education Provided Role of Occupational Therapist;Activities of Daily Living   Role of Occupational Therapist Patient Response Patient;Acceptance;Explanation;Demonstration;Verbal Demonstration;Action Demonstration   ADL Patient Response Patient;Acceptance;Explanation;Demonstration;Verbal Demonstration;Action Demonstration   Problem List   Problem List Decreased Active Daily Living Skills;Decreased Homemaking Skills;Decreased Functional Mobility;Decreased Activity Tolerance;Impaired Posture / Trunk Alignment;Safety Awareness Deficits / Cognition;Impaired Postural Control / Balance   Anticipated Discharge Equipment and Recommendations   DC Equipment Recommendations Unable to determine at this time   Discharge Recommendations Other -  (If brother is unable to provide 24/7 care, then post-acute)   Interdisciplinary Plan of Care Collaboration   IDT Collaboration with  Nursing   Patient Position at End of Therapy Seated;Chair Alarm On;Call Light within Reach;Tray Table within Reach;Phone within Reach   Collaboration Comments report given   Session Information   Date / Session Number  8/2,1, (1/3, 8/8)    Priority 2

## 2021-08-02 NOTE — PROGRESS NOTES
[facility] Medicine Daily Progress Note    Date of Service  8/2/2021    Chief Complaint  Dinah Mathur is a 65 y.o. male admitted 8/1/2021 with ground level fall, facial trauma.      Hospital Course  No notes on file    Interval Problem Update  No acute overnight events.    I have personally seen and examined the patient at bedside. I discussed the plan of care with patient.    Consultants/Specialty  General Surgery: No interventions.   Neurosurgery: No need for followup HCT unless symptoms develop. OK for Lovenox.  Facial Trauma: Will followup with patient as an outpatient.        Code Status  Full Code    Disposition  Patient is not medically cleared.   Anticipate discharge to SNF given PT evals.  I have placed the appropriate orders for post-discharge needs.    Review of Systems  All systems reviewed and negative except as noted per above.    Physical Exam  Temp:  [36.4 °C (97.5 °F)-37.1 °C (98.7 °F)] 36.4 °C (97.5 °F)  Pulse:  [] 83  Resp:  [18] 18  BP: (114-129)/(70-81) 114/74  SpO2:  [95 %-98 %] 98 %    General: No acute distress  HEENT upper lip swollen left greater than right, several teeth avulsed, pupils equal round reactive to light  Neck: No JVD  Chest: Respirations are unlabored  Cardiac: Physiologic S1 and S2  Abdomen: Soft, nontender, nondistended  Extremities: Without clubbing, cyanosis or edema  Neuro: Cranial nerves II through XII are grossly intact.  Psych: No anxiety, judgement intact.        Current Facility-Administered Medications:   •  amitriptyline (ELAVIL) tablet 25 mg, 25 mg, Oral, Q EVENING, Lupis Walker M.D.  •  carbidopa-levodopa (SINEMET)  MG tablet 1 tablet, 1 tablet, Oral, Q6HRS, Lupis Walker M.D., 1 tablet at 08/02/21 1218  •  tamsulosin (FLOMAX) capsule 0.4 mg, 0.4 mg, Oral, DAILY, Lupis Walker M.D.  •  rosuvastatin (CRESTOR) tablet 40 mg, 40 mg, Oral, DAILY, Lupis Walker M.D.  •  metFORMIN (GLUCOPHAGE) tablet 500 mg, 500 mg, Oral, BID, Lupis Walker M.D.  •   senna-docusate (PERICOLACE or SENOKOT S) 8.6-50 MG per tablet 2 tablet, 2 tablet, Oral, BID **AND** polyethylene glycol/lytes (MIRALAX) PACKET 1 Packet, 1 Packet, Oral, QDAY PRN **AND** magnesium hydroxide (MILK OF MAGNESIA) suspension 30 mL, 30 mL, Oral, QDAY PRN **AND** bisacodyl (DULCOLAX) suppository 10 mg, 10 mg, Rectal, QDAY PRN, Lupis Walker M.D.  •  acetaminophen (Tylenol) tablet 650 mg, 650 mg, Oral, Q6HRS PRN, Lupis Walker M.D.  •  enalaprilat (VASOTEC) injection 1.25 mg, 1.25 mg, Intravenous, Q6HRS PRN, Lupis Walker M.D.  •  labetalol (NORMODYNE/TRANDATE) injection 10 mg, 10 mg, Intravenous, Q4HRS PRN, Lupis Walker M.D.  •  ondansetron (ZOFRAN) syringe/vial injection 4 mg, 4 mg, Intravenous, Q4HRS PRN, Lupis Walker M.D.  •  ondansetron (ZOFRAN ODT) dispertab 4 mg, 4 mg, Oral, Q4HRS PRN, Lupis Walker M.D.  •  insulin regular (HumuLIN R,NovoLIN R) injection, 1-6 Units, Subcutaneous, 4X/DAY ACHS, 2 Units at 08/02/21 1217 **AND** POC blood glucose manual result, , , Q AC AND BEDTIME(S) **AND** NOTIFY MD and PharmD, , , Once **AND** glucose 4 g chewable tablet 16 g, 16 g, Oral, Q15 MIN PRN **AND** dextrose 50% (D50W) injection 50 mL, 50 mL, Intravenous, Q15 MIN PRN, Lupis Walker M.D.      Fluids    Intake/Output Summary (Last 24 hours) at 8/2/2021 1439  Last data filed at 8/2/2021 0600  Gross per 24 hour   Intake --   Output 650 ml   Net -650 ml       Laboratory  Recent Labs     08/01/21 0902 08/01/21  0902 08/01/21  1551 08/01/21  2249 08/02/21  0353   WBC 8.1  --   --   --  10.1   RBC 5.78  --   --   --  5.13   HEMOGLOBIN 16.0   < > 15.0 14.8 14.3   HEMATOCRIT 50.6  --   --   --  44.0   MCV 87.5  --   --   --  85.8   MCH 27.7  --   --   --  27.9   MCHC 31.6*  --   --   --  32.5*   RDW 44.0  --   --   --  43.1   PLATELETCT 195  --   --   --  185   MPV 9.6  --   --   --  9.8    < > = values in this interval not displayed.     Recent Labs     08/01/21 0902 08/02/21 0353   SODIUM 141 140   POTASSIUM  4.4 3.6   CHLORIDE 103 105   CO2 26 22   GLUCOSE 147* 136*   BUN 27* 32*   CREATININE 1.10 0.90   CALCIUM 9.4 9.3     Recent Labs     08/01/21  0902   APTT 32.5   INR 0.99         Recent Labs     08/02/21  0353   TRIGLYCERIDE 130   HDL 25*   LDL 34       Imaging  DX-FEMUR-2+ LEFT   Final Result      No acute osseous abnormality.      CT-HEAD W/O   Final Result      No significant change in right temporal and small amount left parafalcine subarachnoid hemorrhage.      DX-SHOULDER 2+ LEFT   Final Result      1.  No acute left shoulder fracture or dislocation.      2.  Moderate degenerative change of the left AC joint.      CT-HEAD W/O   Final Result      1.  Small left parafalcine subdural hematoma in the left parietal region.      2.  No mass effect.      3.  No calvarial fracture.      Findings were discussed with MI VASQUES M.D. on 8/1/2021 9:56 AM.      CT-MAXILLOFACIAL W/O PLUS RECONS   Final Result         1.  Multiple missing anterior left-sided maxillary teeth with overlying soft tissue hematoma.      2.  No other maxillary fracture.      3.  No mandibular fracture or TMJ dislocation.      4.  Otherwise negative CT examination of the maxillofacial bones.      5.  Findings were discussed with MI VASQUES M.D. on 8/1/2021 9:55 AM.              Assessment/Plan  HLD (hyperlipidemia)- (present on admission)  Assessment & Plan  Continue home crestor.  Check lipid panel in a.m.    Benign prostatic hyperplasia without lower urinary tract symptoms- (present on admission)  Assessment & Plan  Continue home Flomax.  BladderScan as needed for inability to void.    SDH (subdural hematoma) (HCC)- (present on admission)  Assessment & Plan  Secondary to trauma.  CT head with left parafalcine small subdural hematoma left parietal.  Neurochecks every 1 hour.  Admitted to neurology unit.  Repeat CT head in 6 hours to ensure stability.  Patient takes aspirin 81 mg p.o. daily but no other blood thinners.  Avoid DVT prophylaxis  chemically.  SCDs only.  No obvious focal neurological deficit noted on exam.  ERP has discussed with neurosurgeon on-call as well as trauma surgeon.  Both state medical admission.      Tooth avulsion- (present on admission)  Assessment & Plan  ERP has spoken with oral surgeon.  Plan is for outpatient evaluation of avulsed maxillary medial incisors as well as evaluation of his mandibular medial incisor loose teeth.  Cold popsicles ordered to control current oozing bleeding.  Placed on puréed thin liquid diet.  Patient has normal speech on exam no obvious aspiration.    Parkinson disease (HCC)- (present on admission)  Assessment & Plan  Continue home carbidopa 4 times daily.  Likely his Parkinson's had something to do with his fall today.  No loss of consciousness per patient he states he face planted into the sidewalk where he avulsed his upper teeth.  I have ordered PT OT evaluations.  No assistive devices at home.  Fall precautions given his acute subdural hematoma.    Osteoarthritis of both hands- (present on admission)  Assessment & Plan  History of.    Type 2 diabetes mellitus, without long-term current use of insulin ([facility])- (present on admission)  Assessment & Plan  Check a hemoglobin A1c  Consistent carbohydrate diet  Patient is able to tolerate oral diet.  Started on his home Metformin 500 twice daily with meals.  It does not appear the patient was hypoglycemic as cause of his fall.         VTE prophylaxis: enoxaparin ppx    I have performed a physical exam and reviewed and updated ROS and Plan today (8/2/2021). In review of yesterday's note (8/1/2021), there are no changes except as documented above.

## 2021-08-02 NOTE — THERAPY
Speech Language Pathology   Clinical Swallow Evaluation     Patient Name: Dinah Mathur  AGE:  65 y.o., SEX:  male  Medical Record #: [id number]  Today's Date: 8/2/2021     Assessment    Patient is 65 y.o. male admitted s/p GLF CT indicative of  left parafalcine small subdural hematoma and avulsed L maxillary teeth. Pt sustained significant trauma to mouth/lip which reportedly does not require surgical intervention at this time.    The patient was seen for clinical swallow evaluation this date. The patient was awake, alert and oriented to self, location and reason. The patient was able to follow oral motor directives with moderate left labial swelling resulting in decreased movement and poor labial seal. The patient was given PO trials of ice chips (instructed to let melt only), thins, purees and mildly thick liquids. The patient was able to take PO trials of liquids and purees from spoon and open cup. The patient consumed PO trials of thin liquids with immediate cough response in >75% of trials despite attempts at swallow strategies. Patient consumed mildly thick liquid trials with no overt s/s of aspiration when single sips were utilized but x1 cough following serial sips of liquids. No straws were attempted given recent avulsion of teeth. The patient was provided education regarding s/s of aspiration and swallow strategies however, ongoing reinforcement needed.        Plan    Recommendations: 1) Initiate liquidized mildly thick liquids with swallow strategies. 2) Consider medications crushed in purees given facial swelling and difficulty manipulating bolus in mouth.     Recommend Speech Therapy 3 times per week until therapy goals are met for the following treatments:  Dysphagia Training.    Discharge Recommendations: Recommend outpatient speech therapy services     Objective       08/02/21 1155   Oral Motor Eval    Is Patient Able to Complete Oral Motor Eval Yes but Impaired   Laryngeal Function   Voice Quality  "Within Functional Limits   Volutional Cough Within Functional Limits   Excursion Upon Swallow Complete   Oral Food Presentation   Ice Chips Within Functional Limits  (let melt. )   Single Swallow Mildly Thick (2) - (Nectar Thick)  Minimal   Single Swallow Thin (0) Moderate   Serial Swallow Thin (0) Not Tested   Pureed (4) Within Functional Limits   Minced & Moist (5) - (Dysphagia II) Within Functional Limits   Soft & Bite-Sized (6) - (Dysphagia III) Not Tested   Tracheostomy   Tracheostomy  No   Dysphagia Strategies / Recommendations   Strategies / Interventions Recommended (Yes / No) Yes   Compensatory Strategies Head of Bed 90 Degrees During Eating / Drinking;Single Sips / Bites;Multiple Swallows   Diet / Liquid Recommendation Liquidised (3) - (Nectar Thick Full Liquid);Mildly Thick (2) - (Nectar Thick)   Medication Administration  Crush all Medications in Puree   Therapy Interventions Dysphagia Therapy By Speech Language Pathologist;Pharyngeal / Laryngeal Exercises;Oral Motor Exercises   Dysphagia Rating   Nutritional Liquid Intake Rating Scale Thickened beverages (mildly thick unless otherwise specified)   Nutritional Food Intake Rating Scale Total oral diet of a single consistency   Patient / Family Goals   Patient / Family Goal #1 \" I'm starving.\"   Short Term Goals   Short Term Goal # 1 The patient will utilize swallow strategies during LQ3/MT2 meal items with no overt s/s of aspiration given min A.          "

## 2021-08-02 NOTE — CONSULTS
DATE OF SERVICE:  08/01/2021     INPATIENT CONSULTATION     CHIEF COMPLAINT:  Minimal intracranial hemorrhage after fall.     HISTORY OF PRESENT ILLNESS:  This is a 65-year-old man who takes aspirin for   reasons that he cannot remember who fell.  He slipped forward and fell on his   face.  He has some facial lacerations.  He denies loss of consciousness.  He   has had some chronic left-sided contraction over the last three years.  He is   not reporting any evidence of symptoms of myelopathy including dropping things   or walking oddly.  He fell onto his left side, is very painful.     PAST MEDICAL HISTORY:  Significant for Parkinson's disease, diabetes.     PAST SURGICAL HISTORY:  He denies any significant surgeries.     SOCIAL HISTORY:  He denies smoking or drinking.  There is no family at his   bedside.     PHYSICAL EXAMINATION:  GENERAL:  He is awake, alert and oriented x3.  He has a lot of facial trauma   including lip swelling.  HEENT:  Pupils are symmetric.  Extraocular movements are intact.  Face is   full.  Tongue is midline.  NEUROLOGIC:   He has reasonable  strength 4+ to 5/5 bilaterally.  Good   dorsiflexion, EHL, plantar flexion and intact sensation in the face, trunk,   arms and legs.     ASSESSMENT AND PLAN:  The patient has a fall and has minimal intracranial   hemorrhage on his head CT, parafalcine. There is no need for repeat CAT scan   unless he declines.  Please hold the aspirin for 2 weeks.  He does not need   Keppra.  He does not need to be a repeat CAT scan ____ neurologic exam   changes.  He is okay for Lovenox in the morning.  He does not need followup   with me regarding this point.  Follow up with his primary care doctor.     Thank you for allowing me to participate in his care.        ______________________________  MD DAHIANA Roberts III/[place] [place]    DD:  08/01/2021 13:15  DT:  08/01/2021 16:13    Job#:  [id number]

## 2021-08-03 ENCOUNTER — PATIENT OUTREACH (OUTPATIENT)
Dept: HEALTH INFORMATION MANAGEMENT | Facility: OTHER | Age: 66
End: 2021-08-03

## 2021-08-03 LAB
ANION GAP SERPL CALC-SCNC: 13 MMOL/L (ref 7–16)
BUN SERPL-MCNC: 34 MG/DL (ref 8–22)
CALCIUM SERPL-MCNC: 9.2 MG/DL (ref 8.5–10.5)
CHLORIDE SERPL-SCNC: 102 MMOL/L (ref 96–112)
CO2 SERPL-SCNC: 22 MMOL/L (ref 20–33)
CREAT SERPL-MCNC: 0.84 MG/DL (ref 0.5–1.4)
ERYTHROCYTE [DISTWIDTH] IN BLOOD BY AUTOMATED COUNT: 44.9 FL (ref 35.9–50)
GLUCOSE BLD-MCNC: 105 MG/DL (ref 65–99)
GLUCOSE BLD-MCNC: 114 MG/DL (ref 65–99)
GLUCOSE BLD-MCNC: 140 MG/DL (ref 65–99)
GLUCOSE BLD-MCNC: 140 MG/DL (ref 65–99)
GLUCOSE SERPL-MCNC: 138 MG/DL (ref 65–99)
HCT VFR BLD AUTO: 40.6 % (ref 42–52)
HGB BLD-MCNC: 13.1 G/DL (ref 14–18)
MCH RBC QN AUTO: 27.9 PG (ref 27–33)
MCHC RBC AUTO-ENTMCNC: 32.3 G/DL (ref 33.7–35.3)
MCV RBC AUTO: 86.6 FL (ref 81.4–97.8)
PLATELET # BLD AUTO: 163 K/UL (ref 164–446)
PMV BLD AUTO: 9.7 FL (ref 9–12.9)
POTASSIUM SERPL-SCNC: 3.5 MMOL/L (ref 3.6–5.5)
RBC # BLD AUTO: 4.69 M/UL (ref 4.7–6.1)
SODIUM SERPL-SCNC: 137 MMOL/L (ref 135–145)
WBC # BLD AUTO: 9.4 K/UL (ref 4.8–10.8)

## 2021-08-03 PROCEDURE — 82962 GLUCOSE BLOOD TEST: CPT | Mod: 91

## 2021-08-03 PROCEDURE — 85027 COMPLETE CBC AUTOMATED: CPT

## 2021-08-03 PROCEDURE — 700102 HCHG RX REV CODE 250 W/ 637 OVERRIDE(OP): Performed by: HOSPITALIST

## 2021-08-03 PROCEDURE — 700102 HCHG RX REV CODE 250 W/ 637 OVERRIDE(OP): Performed by: INTERNAL MEDICINE

## 2021-08-03 PROCEDURE — A9270 NON-COVERED ITEM OR SERVICE: HCPCS | Performed by: HOSPITALIST

## 2021-08-03 PROCEDURE — A9270 NON-COVERED ITEM OR SERVICE: HCPCS | Performed by: INTERNAL MEDICINE

## 2021-08-03 PROCEDURE — 700111 HCHG RX REV CODE 636 W/ 250 OVERRIDE (IP): Performed by: HOSPITALIST

## 2021-08-03 PROCEDURE — 99232 SBSQ HOSP IP/OBS MODERATE 35: CPT | Performed by: INTERNAL MEDICINE

## 2021-08-03 PROCEDURE — 84425 ASSAY OF VITAMIN B-1: CPT

## 2021-08-03 PROCEDURE — 36415 COLL VENOUS BLD VENIPUNCTURE: CPT

## 2021-08-03 PROCEDURE — 770006 HCHG ROOM/CARE - MED/SURG/GYN SEMI*

## 2021-08-03 PROCEDURE — 80048 BASIC METABOLIC PNL TOTAL CA: CPT

## 2021-08-03 RX ORDER — POTASSIUM CHLORIDE 20 MEQ/1
40 TABLET, EXTENDED RELEASE ORAL ONCE
Status: COMPLETED | OUTPATIENT
Start: 2021-08-03 | End: 2021-08-03

## 2021-08-03 RX ADMIN — CARBIDOPA AND LEVODOPA 1 TABLET: 25; 250 TABLET ORAL at 17:32

## 2021-08-03 RX ADMIN — CARBIDOPA AND LEVODOPA 1 TABLET: 25; 250 TABLET ORAL at 05:14

## 2021-08-03 RX ADMIN — AMITRIPTYLINE HYDROCHLORIDE 25 MG: 25 TABLET, FILM COATED ORAL at 17:32

## 2021-08-03 RX ADMIN — METFORMIN HYDROCHLORIDE 500 MG: 500 TABLET ORAL at 17:32

## 2021-08-03 RX ADMIN — POTASSIUM CHLORIDE 40 MEQ: 1500 TABLET, EXTENDED RELEASE ORAL at 11:08

## 2021-08-03 RX ADMIN — CARBIDOPA AND LEVODOPA 1 TABLET: 25; 250 TABLET ORAL at 11:08

## 2021-08-03 RX ADMIN — TAMSULOSIN HYDROCHLORIDE 0.4 MG: 0.4 CAPSULE ORAL at 05:14

## 2021-08-03 RX ADMIN — ENOXAPARIN SODIUM 40 MG: 40 INJECTION SUBCUTANEOUS at 05:14

## 2021-08-03 RX ADMIN — METFORMIN HYDROCHLORIDE 500 MG: 500 TABLET ORAL at 05:14

## 2021-08-03 RX ADMIN — DOCUSATE SODIUM 50 MG AND SENNOSIDES 8.6 MG 2 TABLET: 8.6; 5 TABLET, FILM COATED ORAL at 17:32

## 2021-08-03 RX ADMIN — CARBIDOPA AND LEVODOPA 1 TABLET: 25; 250 TABLET ORAL at 00:25

## 2021-08-03 RX ADMIN — DOCUSATE SODIUM 50 MG AND SENNOSIDES 8.6 MG 2 TABLET: 8.6; 5 TABLET, FILM COATED ORAL at 05:14

## 2021-08-03 RX ADMIN — ROSUVASTATIN CALCIUM 40 MG: 20 TABLET, FILM COATED ORAL at 05:14

## 2021-08-03 NOTE — PROGRESS NOTES
Received discharge orders for patient when accepted into SNF. Per MD it is okay to discontinue telemetry at this time.

## 2021-08-03 NOTE — CARE PLAN
The patient is Stable - Low risk of patient condition declining or worsening    Shift Goals  Clinical Goals: stable neuro exam  Patient Goals: sleep   Family Goals: ALTAF    Progress made toward(s) clinical / shift goals:  pt is A/Ox4 at this time. Pt sleeping comfortably. Will continue hourly rounding.     Patient is not progressing towards the following goals:

## 2021-08-03 NOTE — DISCHARGE PLANNING
Care Transition Team Discharge Planning    Anticipated Discharge Disposition: DC to SNF.    Action: Lsw met with patient to complete the CTT assessment. Patient presented A&Ox4 e/b being able to confirm the information on the facesheet. The patient reported that Dr. Aviles was his PCP. He also stated that his address is [address] [place] [address].  The patient reported that he was independent with ADLs and IADLs. He reported that he does not drive or have a car. He also stated that he drinks 2 times per week. He stated that his last alcoholic beverage was 3 days ago.     CHOICE forms was sent to [facility] ([facility], [facility], and [facility]). Patient was accepted at [facility] and [facility].  [facility] will have a bed available at 1100 on 8/4/21. [facility] will transport.    CHOICE form was placed in CM basket.    Barriers to Discharge: None.    Plan: Lsw will assist medical team with DC planning.    Care Transition Team Assessment    Information Source  Orientation Level: Oriented X4  Information Given By: Patient  Informant's Name:  (Dinah Mathur)  Who is responsible for making decisions for patient? : Patient    Readmission Evaluation  Is this a readmission?: No    Elopement Risk  Legal Hold: No  Ambulatory or Self Mobile in Wheelchair: No-Not an Elopement Risk  Elopement Risk: Not at Risk for Elopement    Interdisciplinary Discharge Planning  Lives with - Patient's Self Care Capacity: Other (Comments) (brother)  Housing / Facility: 1 Story Apartment / Condo  Prior Services: Continuous (24 Hour) Care Giving Family, Other (Comments) (Pt reports [facility] visit home )    Discharge Preparedness  What is your plan after discharge?: Skilled nursing facility  What are your discharge supports?: Sibling  Prior Functional Level: Ambulatory, Independent with Activities of Daily Living, Independent with Medication Management  Difficulity with ADLs: None  Difficulity with IADLs: None    Functional Assesment  Prior Functional  Level: Ambulatory, Independent with Activities of Daily Living, Independent with Medication Management    Finances  Financial Barriers to Discharge: No  Prescription Coverage: Yes    Vision / Hearing Impairment  Right Eye Vision: Wears Glasses, Impaired  Left Eye Vision: Wears Glasses, Impaired         Advance Directive  Advance Directive?: None  Advance Directive offered?: AD Booklet refused    Domestic Abuse  Have you ever been the victim of abuse or violence?: No    Psychological Assessment  History of Substance Abuse: Alcohol  Date Last Used - Alcohol: 3 days ago  Substance Abuse Comments: Patient reported drinking 2 days per week  History of Psychiatric Problems: No  Non-compliant with Treatment: Yes  Newly Diagnosed Illness: No    Discharge Risks or Barriers  Discharge risks or barriers?: No    Anticipated Discharge Information  Discharge Disposition: Still a Patient (30)  Discharge Address: [address] [place] [address], [place]   Discharge Contact Phone Number: [phone]

## 2021-08-03 NOTE — PROGRESS NOTES
Hospital Medicine Daily Progress Note    Date of Service  8/3/2021    Chief Complaint  Dinah Mahtur is a 65 y.o. male admitted 8/1/2021 with ground level fall, facial trauma.      Hospital Course  65-year-old male past medical Parkinson disease, type 2 diabetes dementia with baseline wandering, caregiver is his brother presented with ground-level fall.  Refer to H&P of Dr. Ballesterso for further details.  On arrival he had active bleeding from his maxillary medial incisor teeth.  CT head with left parafalcine small subdural hematoma.  CT maxillofacial negative for fractures other than avulsed left maxillary teeth.  Left shoulder x-ray unremarkable.  Neurosurgery was consulted and there they did recommend just medical observation.  Repeat CT head in 6 hours no changes from above.      Interval Problem Update  No acute overnight events.    8/3- no event. Remain stable. His lip is very swollen, bloody. Difficult to understand pt. Pt is very emotional otherwise. Remain medical stable. He can be transfer for rehab. Consider to repeat scan if changes     I have personally seen and examined the patient at bedside. I discussed the plan of care with patient. RN, , charge nurse, pharmacist     Consultants/Specialty  General Surgery: No interventions.   Neurosurgery: No need for followup HCT unless symptoms develop. OK for Lovenox.  Facial Trauma: Will followup with patient as an outpatient.        Code Status  Full Code    Disposition  Patient is medically cleared.   Anticipate discharge to SNF given PT evals.  I have placed the appropriate orders for post-discharge needs.    Review of Systems  All systems reviewed and negative except as noted per above.    Physical Exam  Temp:  [36.4 °C (97.5 °F)-36.7 °C (98.1 °F)] 36.4 °C (97.5 °F)  Pulse:  [63-78] 64  Resp:  [16-18] 16  BP: (102-137)/(60-69) 103/62  SpO2:  [96 %-99 %] 97 %    General: No acute distress  HEENT upper lip swollen left greater than right, several  teeth avulsed, pupils equal round reactive to light. Upper lip very swollen and extensive bruises   Neck: No JVD  Chest: Respirations are unlabored  Cardiac: Physiologic S1 and S2  Abdomen: Soft, nontender, nondistended  Extremities: Without clubbing, cyanosis or edema  Neuro: Cranial nerves II through XII are grossly intact. Mumbling and difficult to understand from lip swelling   Psych: No anxiety, judgement intact.        Current Facility-Administered Medications:   •  amitriptyline (ELAVIL) tablet 25 mg, 25 mg, Oral, Q EVENING, Lupis Walker M.D., 25 mg at 08/02/21 1734  •  carbidopa-levodopa (SINEMET)  MG tablet 1 tablet, 1 tablet, Oral, Q6HRS, Lupis Walker M.D., 1 tablet at 08/03/21 1108  •  tamsulosin (FLOMAX) capsule 0.4 mg, 0.4 mg, Oral, DAILY, Lupis Walker M.D., 0.4 mg at 08/03/21 0514  •  rosuvastatin (CRESTOR) tablet 40 mg, 40 mg, Oral, DAILY, Lupis Walker M.D., 40 mg at 08/03/21 0514  •  metFORMIN (GLUCOPHAGE) tablet 500 mg, 500 mg, Oral, BID, Lupis Walker M.D., 500 mg at 08/03/21 0514  •  senna-docusate (PERICOLACE or SENOKOT S) 8.6-50 MG per tablet 2 tablet, 2 tablet, Oral, BID, 2 tablet at 08/03/21 0514 **AND** polyethylene glycol/lytes (MIRALAX) PACKET 1 Packet, 1 Packet, Oral, QDAY PRN **AND** magnesium hydroxide (MILK OF MAGNESIA) suspension 30 mL, 30 mL, Oral, QDAY PRN **AND** bisacodyl (DULCOLAX) suppository 10 mg, 10 mg, Rectal, QDAY PRN, Lupis Walker M.D.  •  acetaminophen (Tylenol) tablet 650 mg, 650 mg, Oral, Q6HRS PRN, Lupis Walker M.D.  •  enalaprilat (VASOTEC) injection 1.25 mg, 1.25 mg, Intravenous, Q6HRS PRN, Lupis Walker M.D.  •  labetalol (NORMODYNE/TRANDATE) injection 10 mg, 10 mg, Intravenous, Q4HRS PRN, Lupis Walker M.D.  •  ondansetron (ZOFRAN) syringe/vial injection 4 mg, 4 mg, Intravenous, Q4HRS PRN, Lupis Walker M.D.  •  ondansetron (ZOFRAN ODT) dispertab 4 mg, 4 mg, Oral, Q4HRS PRN, Lupis Walker M.D.  •  insulin regular (HumuLIN R,NovoLIN R) injection, 1-6  Units, Subcutaneous, 4X/DAY ACHS, 2 Units at 08/02/21 1217 **AND** POC blood glucose manual result, , , Q AC AND BEDTIME(S) **AND** NOTIFY MD and PharmD, , , Once **AND** glucose 4 g chewable tablet 16 g, 16 g, Oral, Q15 MIN PRN **AND** dextrose 50% (D50W) injection 50 mL, 50 mL, Intravenous, Q15 MIN PRN, Lupis Walker M.D.      Fluids    Intake/Output Summary (Last 24 hours) at 8/3/2021 1510  Last data filed at 8/3/2021 1000  Gross per 24 hour   Intake 240 ml   Output 400 ml   Net -160 ml       Laboratory  Recent Labs     08/01/21  0902 08/01/21  1551 08/01/21  2249 08/02/21  0353 08/03/21  0318   WBC 8.1  --   --  10.1 9.4   RBC 5.78  --   --  5.13 4.69*   HEMOGLOBIN 16.0   < > 14.8 14.3 13.1*   HEMATOCRIT 50.6  --   --  44.0 40.6*   MCV 87.5  --   --  85.8 86.6   MCH 27.7  --   --  27.9 27.9   MCHC 31.6*  --   --  32.5* 32.3*   RDW 44.0  --   --  43.1 44.9   PLATELETCT 195  --   --  185 163*   MPV 9.6  --   --  9.8 9.7    < > = values in this interval not displayed.     Recent Labs     08/01/21  0902 08/02/21  0353 08/03/21  0318   SODIUM 141 140 137   POTASSIUM 4.4 3.6 3.5*   CHLORIDE 103 105 102   CO2 26 22 22   GLUCOSE 147* 136* 138*   BUN 27* 32* 34*   CREATININE 1.10 0.90 0.84   CALCIUM 9.4 9.3 9.2     Recent Labs     08/01/21  0902   APTT 32.5   INR 0.99         Recent Labs     08/02/21  0353   TRIGLYCERIDE 130   HDL 25*   LDL 34       Imaging  DX-FEMUR-2+ LEFT   Final Result      No acute osseous abnormality.      CT-HEAD W/O   Final Result      No significant change in right temporal and small amount left parafalcine subarachnoid hemorrhage.      DX-SHOULDER 2+ LEFT   Final Result      1.  No acute left shoulder fracture or dislocation.      2.  Moderate degenerative change of the left AC joint.      CT-HEAD W/O   Final Result      1.  Small left parafalcine subdural hematoma in the left parietal region.      2.  No mass effect.      3.  No calvarial fracture.      Findings were discussed with MI  LAYO GUTIERREZ on 8/1/2021 9:56 AM.      CT-MAXILLOFACIAL W/O PLUS RECONS   Final Result         1.  Multiple missing anterior left-sided maxillary teeth with overlying soft tissue hematoma.      2.  No other maxillary fracture.      3.  No mandibular fracture or TMJ dislocation.      4.  Otherwise negative CT examination of the maxillofacial bones.      5.  Findings were discussed with MI VASQUES M.D. on 8/1/2021 9:55 AM.              Assessment/Plan  HLD (hyperlipidemia)- (present on admission)  Assessment & Plan  Continue home crestor.      Benign prostatic hyperplasia without lower urinary tract symptoms- (present on admission)  Assessment & Plan  Continue home Flomax.  BladderScan as needed for inability to void.    SDH (subdural hematoma) ([facility])- (present on admission)  Assessment & Plan  Secondary to trauma.  CT head with left parafalcine small subdural hematoma left parietal.  Neurochecks every 1 hour.  Admitted to neurology unit.  Repeat CT head in 6 hours to ensure stability.  Patient takes aspirin 81 mg p.o. daily but no other blood thinners.  Avoid DVT prophylaxis chemically.  SCDs only.  No obvious focal neurological deficit noted on exam.  ERP has discussed with neurosurgeon on-call as well as trauma surgeon.  Both state medical admission.  8/3- remain stable. No changes. Medical cleared for rehab     Tooth avulsion- (present on admission)  Assessment & Plan  ERP has spoken with oral surgeon.  Plan is for outpatient evaluation of avulsed maxillary medial incisors as well as evaluation of his mandibular medial incisor loose teeth.  Cold popsicles ordered to control current oozing bleeding.  Placed on puréed thin liquid diet.  Patient has normal speech on exam no obvious aspiration.  8/3- continue ice and supportive measurement     Parkinson disease (HCC)- (present on admission)  Assessment & Plan  Continue home carbidopa 4 times daily.  Likely his Parkinson's had something to do with his fall today.  No  loss of consciousness per patient he states he face planted into the sidewalk where he avulsed his upper teeth.  I have ordered PT OT evaluations.  No assistive devices at home.  Fall precautions given his acute subdural hematoma.    Osteoarthritis of both hands- (present on admission)  Assessment & Plan  History of.    Type 2 diabetes mellitus, without long-term current use of insulin (HCC)- (present on admission)  Assessment & Plan   A1c 6.7  Consistent carbohydrate diet  Patient is able to tolerate oral diet.  Started on his home Metformin 500 twice daily with meals.  It does not appear the patient was hypoglycemic as cause of his fall.         VTE prophylaxis: SCDs/TEDs and pharmacologic prophylaxis contraindicated due to subdural hematoma    I have performed a physical exam and reviewed and updated ROS and Plan today (8/3/2021). In review of yesterday's note (8/2/2021), there are no changes except as documented above.

## 2021-08-03 NOTE — DISCHARGE PLANNING
Received Choice form at 0919  Agency[facility]Facility Name: [facility]  Referral sent per Choice form @ 1035      Agency/Facility Name: [facility]  Outcome: Left Vmail regarding referral    Agency[facility]Facility Name: [facility]   Spoke To: Marlene  Outcome: Per marlene they have a bed available tomorrow and they will set up transport for tomorrow at 1100.

## 2021-08-03 NOTE — HOSPITAL COURSE
65-year-old male past medical Parkinson disease, type 2 diabetes dementia with baseline wandering, caregiver is his brother presented with ground-level fall.  Refer to H&P of Dr. Ballesteros for further details.  On arrival he had active bleeding from his maxillary medial incisor teeth.  CT head with left parafalcine small subdural hematoma.  CT maxillofacial negative for fractures other than avulsed left maxillary teeth.  Left shoulder x-ray unremarkable.  Neurosurgery was consulted and there they did recommend just medical observation.  Repeat CT head in 6 hours no changes from above. He remained stable. Speech and strength had improved some. Also facial swelling has improved. PT/OT did recommend placement but patient has no SNF benefits. Home health was resumed to continue therapy at home. Brother was not able to pick him up before 2021 because of family/ arrangement for other family members. Home health arranged with . Referral for neurosurgery placed for outpatient follow up. They to determine when pt can restart aspirin.

## 2021-08-03 NOTE — CARE PLAN
The patient is Stable - Low risk of patient condition declining or worsening    Shift Goals  Clinical Goals: Stable neuro checks  Patient Goals: Mobilize  Family Goals: ALTAF    Progress made toward(s) clinical / shift goals:  Neuro checks remain stable. Pt has ambulated to the bathroom x2 with 1 assist and a walker and sat up in chair in room.

## 2021-08-04 PROBLEM — S09.93XA FACIAL TRAUMA: Status: ACTIVE | Noted: 2021-08-04

## 2021-08-04 PROBLEM — E87.6 HYPOKALEMIA: Status: ACTIVE | Noted: 2021-08-04

## 2021-08-04 LAB
BASOPHILS # BLD AUTO: 1.5 % (ref 0–1.8)
BASOPHILS # BLD: 0.1 K/UL (ref 0–0.12)
EOSINOPHIL # BLD AUTO: 0.37 K/UL (ref 0–0.51)
EOSINOPHIL NFR BLD: 5.5 % (ref 0–6.9)
ERYTHROCYTE [DISTWIDTH] IN BLOOD BY AUTOMATED COUNT: 43.6 FL (ref 35.9–50)
GLUCOSE BLD-MCNC: 101 MG/DL (ref 65–99)
GLUCOSE BLD-MCNC: 107 MG/DL (ref 65–99)
GLUCOSE BLD-MCNC: 116 MG/DL (ref 65–99)
GLUCOSE BLD-MCNC: 133 MG/DL (ref 65–99)
HCT VFR BLD AUTO: 38.2 % (ref 42–52)
HGB BLD-MCNC: 12.2 G/DL (ref 14–18)
IMM GRANULOCYTES # BLD AUTO: 0.02 K/UL (ref 0–0.11)
IMM GRANULOCYTES NFR BLD AUTO: 0.3 % (ref 0–0.9)
LYMPHOCYTES # BLD AUTO: 1.88 K/UL (ref 1–4.8)
LYMPHOCYTES NFR BLD: 28.2 % (ref 22–41)
MCH RBC QN AUTO: 27.5 PG (ref 27–33)
MCHC RBC AUTO-ENTMCNC: 31.9 G/DL (ref 33.7–35.3)
MCV RBC AUTO: 86.2 FL (ref 81.4–97.8)
MONOCYTES # BLD AUTO: 0.65 K/UL (ref 0–0.85)
MONOCYTES NFR BLD AUTO: 9.7 % (ref 0–13.4)
NEUTROPHILS # BLD AUTO: 3.65 K/UL (ref 1.82–7.42)
NEUTROPHILS NFR BLD: 54.8 % (ref 44–72)
NRBC # BLD AUTO: 0 K/UL
NRBC BLD-RTO: 0 /100 WBC
PLATELET # BLD AUTO: 164 K/UL (ref 164–446)
PMV BLD AUTO: 9.8 FL (ref 9–12.9)
RBC # BLD AUTO: 4.43 M/UL (ref 4.7–6.1)
WBC # BLD AUTO: 6.7 K/UL (ref 4.8–10.8)

## 2021-08-04 PROCEDURE — 99231 SBSQ HOSP IP/OBS SF/LOW 25: CPT | Performed by: INTERNAL MEDICINE

## 2021-08-04 PROCEDURE — 700102 HCHG RX REV CODE 250 W/ 637 OVERRIDE(OP): Performed by: HOSPITALIST

## 2021-08-04 PROCEDURE — 97116 GAIT TRAINING THERAPY: CPT | Mod: CQ

## 2021-08-04 PROCEDURE — 82962 GLUCOSE BLOOD TEST: CPT | Mod: 91

## 2021-08-04 PROCEDURE — 36415 COLL VENOUS BLD VENIPUNCTURE: CPT

## 2021-08-04 PROCEDURE — 97535 SELF CARE MNGMENT TRAINING: CPT

## 2021-08-04 PROCEDURE — 770006 HCHG ROOM/CARE - MED/SURG/GYN SEMI*

## 2021-08-04 PROCEDURE — 97530 THERAPEUTIC ACTIVITIES: CPT | Mod: CQ

## 2021-08-04 PROCEDURE — 85025 COMPLETE CBC W/AUTO DIFF WBC: CPT

## 2021-08-04 PROCEDURE — A9270 NON-COVERED ITEM OR SERVICE: HCPCS | Performed by: HOSPITALIST

## 2021-08-04 RX ADMIN — CARBIDOPA AND LEVODOPA 1 TABLET: 25; 250 TABLET ORAL at 11:41

## 2021-08-04 RX ADMIN — MAGNESIUM HYDROXIDE 30 ML: 400 SUSPENSION ORAL at 05:08

## 2021-08-04 RX ADMIN — CARBIDOPA AND LEVODOPA 1 TABLET: 25; 250 TABLET ORAL at 00:32

## 2021-08-04 RX ADMIN — ROSUVASTATIN CALCIUM 40 MG: 20 TABLET, FILM COATED ORAL at 05:08

## 2021-08-04 RX ADMIN — TAMSULOSIN HYDROCHLORIDE 0.4 MG: 0.4 CAPSULE ORAL at 05:08

## 2021-08-04 RX ADMIN — AMITRIPTYLINE HYDROCHLORIDE 25 MG: 25 TABLET, FILM COATED ORAL at 17:28

## 2021-08-04 RX ADMIN — DOCUSATE SODIUM 50 MG AND SENNOSIDES 8.6 MG 2 TABLET: 8.6; 5 TABLET, FILM COATED ORAL at 05:08

## 2021-08-04 RX ADMIN — METFORMIN HYDROCHLORIDE 500 MG: 500 TABLET ORAL at 17:27

## 2021-08-04 RX ADMIN — CARBIDOPA AND LEVODOPA 1 TABLET: 25; 250 TABLET ORAL at 05:08

## 2021-08-04 RX ADMIN — CARBIDOPA AND LEVODOPA 1 TABLET: 25; 250 TABLET ORAL at 17:27

## 2021-08-04 RX ADMIN — METFORMIN HYDROCHLORIDE 500 MG: 500 TABLET ORAL at 05:08

## 2021-08-04 ASSESSMENT — COGNITIVE AND FUNCTIONAL STATUS - GENERAL
SUGGESTED CMS G CODE MODIFIER MOBILITY: CK
SUGGESTED CMS G CODE MODIFIER DAILY ACTIVITY: CK
TOILETING: A LITTLE
HELP NEEDED FOR BATHING: A LOT
MOBILITY SCORE: 17
DAILY ACTIVITIY SCORE: 17
STANDING UP FROM CHAIR USING ARMS: A LITTLE
MOVING TO AND FROM BED TO CHAIR: A LITTLE
WALKING IN HOSPITAL ROOM: A LITTLE
DRESSING REGULAR LOWER BODY CLOTHING: A LOT
TURNING FROM BACK TO SIDE WHILE IN FLAT BAD: A LITTLE
MOVING FROM LYING ON BACK TO SITTING ON SIDE OF FLAT BED: A LITTLE
DRESSING REGULAR UPPER BODY CLOTHING: A LITTLE
PERSONAL GROOMING: A LITTLE
CLIMB 3 TO 5 STEPS WITH RAILING: A LOT

## 2021-08-04 ASSESSMENT — PAIN DESCRIPTION - PAIN TYPE
TYPE: ACUTE PAIN
TYPE: ACUTE PAIN

## 2021-08-04 ASSESSMENT — GAIT ASSESSMENTS
DEVIATION: BRADYKINETIC;DECREASED BASE OF SUPPORT
DISTANCE (FEET): 12
ASSISTIVE DEVICE: FRONT WHEEL WALKER
GAIT LEVEL OF ASSIST: MINIMAL ASSIST

## 2021-08-04 NOTE — THERAPY
"Occupational Therapy  Daily Treatment     Patient Name: Dinah Mathur  Age:  65 y.o., Sex:  male  Medical Record #: [id number]  Today's Date: 8/4/2021     Precautions  Precautions: (P) Fall Risk  Comments: (P) new dx of dementia    Assessment    Pt seen for OT tx session. Per social work note pt was independent w/ BADLs where as pt self reports brother assists him. Pt performed functional mobility w/ FWW, toileting, and standing grooming w/ min A. Pt required encouragement to participate, and v/c's for sequencing/follow through of tasks. Pt demo'd impaired balance, functional mobility, and activity tolerance impacting functional independence. Will continue to follow.     Plan    Continue current treatment plan.    DC Equipment Recommendations: (P) Unable to determine at this time  Discharge Recommendations: (P) Recommend post-acute placement for additional occupational therapy services prior to discharge home    Subjective    \"You came at the right time!\"     Objective       08/04/21 0920   Total Time Spent   Total Time Spent (Mins) 23   Treatment Charges   Charges Yes   OT Self Care / ADL 2   Precautions   Precautions Fall Risk   Comments new dx of dementia   Vitals   O2 (LPM) 0   O2 Delivery Device None - Room Air   Pain 0 - 10 Group   Therapist Pain Assessment Post Activity Pain Same as Prior to Activity;Nurse Notified  (no c/o pain during session)   Cognition    Cognition / Consciousness X   Level of Consciousness Alert   Safety Awareness Impaired   New Learning Impaired   Attention Impaired   Sequencing Impaired   Comments pleasent and cooperative, required encouragement to participate in BADLs    Balance   Sitting Balance (Static) Fair +   Sitting Balance (Dynamic) Fair   Standing Balance (Static) Fair   Standing Balance (Dynamic) Fair -   Weight Shift Sitting Good   Weight Shift Standing Fair   Skilled Intervention Tactile Cuing;Verbal Cuing   Comments w/ FWW   Bed Mobility    Supine to Sit   (NT in chair " pre/post)   Scooting Supervised   Skilled Intervention Tactile Cuing;Verbal Cuing;Facilitation   Activities of Daily Living   Grooming Minimal Assist;Standing  (washed hands, required max cues)   Lower Body Dressing Moderate Assist   Toileting Minimal Assist  (BM in toilet, encouragement to perform personal pericare)   Skilled Intervention Tactile Cuing;Verbal Cuing;Facilitation   How much help from another person does the patient currently need...   Putting on and taking off regular lower body clothing? 2   Bathing (including washing, rinsing, and drying)? 2   Toileting, which includes using a toilet, bedpan, or urinal? 3   Putting on and taking off regular upper body clothing? 3   Taking care of personal grooming such as brushing teeth? 3   Eating meals? 4   6 Clicks Daily Activity Score 17   Functional Mobility   Sit to Stand Minimal Assist   Bed, Chair, Wheelchair Transfer Minimal Assist   Toilet Transfers Moderate Assist  (low toilet)   Transfer Method Stand Step   Mobility w/ FWW   Skilled Intervention Tactile Cuing;Verbal Cuing;Facilitation   Activity Tolerance   Sitting in Chair 20+ min (up pre/post)   Sitting Edge of Bed NT   Standing 5 min   Patient / Family Goals   Patient / Family Goal #1 to get better   Goal #1 Outcome Goal not met   Short Term Goals   Short Term Goal # 1 pt will demo toileting w/ min assist and no c/o pain   Goal Outcome # 1 Goal met, new goal added   Short Term Goal # 1 B  Pt will perform toileting w/ SPV   Goal Outcome  # 1 B Goal not met   Short Term Goal # 2 pt will demo functional txf's w/ spv and use of AD   Goal Outcome # 2 Progressing as expected   Short Term Goal # 3 pt will demo standing g/h ~8 min w/ min assist and AD   Goal Outcome # 3 Progressing as expected   Education Group   Role of Occupational Therapist Patient Response Patient;Acceptance;Explanation;Demonstration;Verbal Demonstration;Action Demonstration   ADL Patient Response  Patient;Acceptance;Explanation;Demonstration;Verbal Demonstration;Action Demonstration   Anticipated Discharge Equipment and Recommendations   DC Equipment Recommendations Unable to determine at this time   Discharge Recommendations Recommend post-acute placement for additional occupational therapy services prior to discharge home   Interdisciplinary Plan of Care Collaboration   IDT Collaboration with  Nursing   Patient Position at End of Therapy Seated;Chair Alarm On;Call Light within Reach;Tray Table within Reach;Phone within Reach   Collaboration Comments report given   Session Information   Date / Session Number  8/4, 2 (2/3, 8/8)   Priority 2

## 2021-08-04 NOTE — CARE PLAN
The patient is Stable - Low risk of patient condition declining or worsening    Shift Goals  Clinical Goals: normal BM  Patient Goals: Rest/BM  Family Goals: ALTAF    Progress made toward(s) clinical / shift goals:  pt was provided with prune juice upon request. Will continue to monitor and medicate appropriately.     Patient is not progressing towards the following goals:

## 2021-08-04 NOTE — DISCHARGE PLANNING
Anticipated Discharge Disposition:   TBD    Action:    [facility] cannot accept patient as they have checked with corporate and are not in network and patient does not have SNF insurance benefit per liaison Echo.    Choice form faxed to [facility] for [facility].    Barriers to Discharge:    SNF acceptance  Insurance auth    Plan:    F/U with [facility].

## 2021-08-04 NOTE — ASSESSMENT & PLAN NOTE
No obvious fracture   Ct maxillofacial 8/2   2.  No other maxillary fracture.     3.  No mandibular fracture or TMJ dislocation.  8/5-swelling improving

## 2021-08-04 NOTE — DISCHARGE PLANNING
Agency/Facility Name: [facility]   Spoke To: Camryn   Outcome: Per camryn they cannot take pt as the pt does not have any insured skilled benefits.    1016  Agency/Facility Name: [facility]  Outcome: Left Vmail regarding referral.

## 2021-08-04 NOTE — PROGRESS NOTES
Hospital Medicine Daily Progress Note    Date of Service  8/4/2021    Chief Complaint  Dinah Mathur is a 65 y.o. male admitted 8/1/2021 with ground level fall, facial trauma.      Hospital Course  65-year-old male past medical Parkinson disease, type 2 diabetes dementia with baseline wandering, caregiver is his brother presented with ground-level fall.  Refer to H&P of Dr. Ballesteros for further details.  On arrival he had active bleeding from his maxillary medial incisor teeth.  CT head with left parafalcine small subdural hematoma.  CT maxillofacial negative for fractures other than avulsed left maxillary teeth.  Left shoulder x-ray unremarkable.  Neurosurgery was consulted and there they did recommend just medical observation.  Repeat CT head in 6 hours no changes from above.      Interval Problem Update  No acute overnight events.    8/3- no event. Remain stable. His lip is very swollen, bloody. Difficult to understand pt. Pt is very emotional otherwise. Remain medical stable. He can be transfer for rehab. Consider to repeat scan if changes     8/4-swelling is improved. He tells me he is doing better. I asked if he wants to go home and he said no. I tried to call brother twice and he did not answer. I left voice mail. I will keep trying again. Per last note from CM possible pt has SNF benefits. Pending dispo planing. Medical cleared.     I have personally seen and examined the patient at bedside. I discussed the plan of care with patient. RN, , charge nurse, pharmacist     Consultants/Specialty  General Surgery: No interventions.   Neurosurgery: No need for followup HCT unless symptoms develop. OK for Lovenox.  Facial Trauma: Will followup with patient as an outpatient.        Code Status  Full Code    Disposition  Patient is medically cleared.   Anticipate discharge to SNF given PT evals.  I have placed the appropriate orders for post-discharge needs.    Review of Systems  All systems reviewed and  negative except as noted per above.    Physical Exam  Temp:  [36.4 °C (97.5 °F)-36.8 °C (98.3 °F)] 36.4 °C (97.5 °F)  Pulse:  [64-75] 64  Resp:  [16-20] 16  BP: (103-127)/(61-72) 115/62  SpO2:  [96 %-99 %] 98 %    General: No acute distress  HEENT upper lip swollen left greater than right, several teeth avulsed, pupils equal round reactive to light. Upper lip very swollen and extensive bruises   Neck: No JVD  Chest: Respirations are unlabored  Cardiac: Physiologic S1 and S2  Abdomen: Soft, nontender, nondistended  Extremities: Without clubbing, cyanosis or edema  Neuro: Cranial nerves II through XII are grossly intact. Mumbling and difficult to understand from lip swelling   Psych: No anxiety, judgement intact.        Current Facility-Administered Medications:   •  amitriptyline (ELAVIL) tablet 25 mg, 25 mg, Oral, Q EVENING, Lupis Walker M.D., 25 mg at 08/03/21 1732  •  carbidopa-levodopa (SINEMET)  MG tablet 1 tablet, 1 tablet, Oral, Q6HRS, Lupis Walker M.D., 1 tablet at 08/04/21 1141  •  tamsulosin (FLOMAX) capsule 0.4 mg, 0.4 mg, Oral, DAILY, Lupis Wlaker M.D., 0.4 mg at 08/04/21 0508  •  rosuvastatin (CRESTOR) tablet 40 mg, 40 mg, Oral, DAILY, Lupis Walker M.D., 40 mg at 08/04/21 0508  •  metFORMIN (GLUCOPHAGE) tablet 500 mg, 500 mg, Oral, BID, Lupis Walker M.D., 500 mg at 08/04/21 0508  •  senna-docusate (PERICOLACE or SENOKOT S) 8.6-50 MG per tablet 2 tablet, 2 tablet, Oral, BID, 2 tablet at 08/04/21 0508 **AND** polyethylene glycol/lytes (MIRALAX) PACKET 1 Packet, 1 Packet, Oral, QDAY PRN **AND** magnesium hydroxide (MILK OF MAGNESIA) suspension 30 mL, 30 mL, Oral, QDAY PRN, 30 mL at 08/04/21 0508 **AND** bisacodyl (DULCOLAX) suppository 10 mg, 10 mg, Rectal, QDAY PRN, Lupis Walker M.D.  •  acetaminophen (Tylenol) tablet 650 mg, 650 mg, Oral, Q6HRS PRN, Lupis Walker M.D.  •  enalaprilat (VASOTEC) injection 1.25 mg, 1.25 mg, Intravenous, Q6HRS PRN, Lupis Walker M.D.  •  labetalol  (NORMODYNE/TRANDATE) injection 10 mg, 10 mg, Intravenous, Q4HRS PRN, Lupis Walker M.D.  •  ondansetron (ZOFRAN) syringe/vial injection 4 mg, 4 mg, Intravenous, Q4HRS PRN, Lupis Walker M.D.  •  ondansetron (ZOFRAN ODT) dispertab 4 mg, 4 mg, Oral, Q4HRS PRN, Lupis Walker M.D.  •  insulin regular (HumuLIN R,NovoLIN R) injection, 1-6 Units, Subcutaneous, 4X/DAY ACHS, 2 Units at 08/02/21 1217 **AND** POC blood glucose manual result, , , Q AC AND BEDTIME(S) **AND** NOTIFY MD and PharmD, , , Once **AND** glucose 4 g chewable tablet 16 g, 16 g, Oral, Q15 MIN PRN **AND** dextrose 50% (D50W) injection 50 mL, 50 mL, Intravenous, Q15 MIN PRN, Lupis Walker M.D.      Fluids    Intake/Output Summary (Last 24 hours) at 8/4/2021 1507  Last data filed at 8/4/2021 1230  Gross per 24 hour   Intake 480 ml   Output 400 ml   Net 80 ml       Laboratory  Recent Labs     08/02/21 0353 08/03/21 0318 08/04/21  0238   WBC 10.1 9.4 6.7   RBC 5.13 4.69* 4.43*   HEMOGLOBIN 14.3 13.1* 12.2*   HEMATOCRIT 44.0 40.6* 38.2*   MCV 85.8 86.6 86.2   MCH 27.9 27.9 27.5   MCHC 32.5* 32.3* 31.9*   RDW 43.1 44.9 43.6   PLATELETCT 185 163* 164   MPV 9.8 9.7 9.8     Recent Labs     08/02/21 0353 08/03/21 0318   SODIUM 140 137   POTASSIUM 3.6 3.5*   CHLORIDE 105 102   CO2 22 22   GLUCOSE 136* 138*   BUN 32* 34*   CREATININE 0.90 0.84   CALCIUM 9.3 9.2             Recent Labs     08/02/21 0353   TRIGLYCERIDE 130   HDL 25*   LDL 34       Imaging  DX-FEMUR-2+ LEFT   Final Result      No acute osseous abnormality.      CT-HEAD W/O   Final Result      No significant change in right temporal and small amount left parafalcine subarachnoid hemorrhage.      DX-SHOULDER 2+ LEFT   Final Result      1.  No acute left shoulder fracture or dislocation.      2.  Moderate degenerative change of the left AC joint.      CT-HEAD W/O   Final Result      1.  Small left parafalcine subdural hematoma in the left parietal region.      2.  No mass effect.      3.  No calvarial  fracture.      Findings were discussed with MI VASQUES M.D. on 8/1/2021 9:56 AM.      CT-MAXILLOFACIAL W/O PLUS RECONS   Final Result         1.  Multiple missing anterior left-sided maxillary teeth with overlying soft tissue hematoma.      2.  No other maxillary fracture.      3.  No mandibular fracture or TMJ dislocation.      4.  Otherwise negative CT examination of the maxillofacial bones.      5.  Findings were discussed with MI VASQUES M.D. on 8/1/2021 9:55 AM.              Assessment/Plan  Hypokalemia  Assessment & Plan  Hypokalemia  - repleting   - Mg PENDING      Facial trauma- (present on admission)  Assessment & Plan  No obvious fracture   Ct maxillofacial 8/2   2.  No other maxillary fracture.     3.  No mandibular fracture or TMJ dislocation.       HLD (hyperlipidemia)- (present on admission)  Assessment & Plan  Continue home crestor.      Benign prostatic hyperplasia without lower urinary tract symptoms- (present on admission)  Assessment & Plan  Continue home Flomax.  BladderScan as needed for inability to void.    SDH (subdural hematoma) (HCC)- (present on admission)  Assessment & Plan  Secondary to trauma.  CT head with left parafalcine small subdural hematoma left parietal.  Neurochecks every 1 hour.  Admitted to neurology unit.  Repeat CT head in 6 hours to ensure stability.  Patient takes aspirin 81 mg p.o. daily but no other blood thinners.  Avoid DVT prophylaxis chemically.  SCDs only.  No obvious focal neurological deficit noted on exam.  ERP has discussed with neurosurgeon on-call as well as trauma surgeon.  Both state medical admission.  8/3- remain stable. No changes. Medical cleared for rehab     Tooth avulsion- (present on admission)  Assessment & Plan  ERP has spoken with oral surgeon.  Plan is for outpatient evaluation of avulsed maxillary medial incisors as well as evaluation of his mandibular medial incisor loose teeth.  Cold popsicles ordered to control current oozing  bleeding.  Placed on puréed thin liquid diet.  Patient has normal speech on exam no obvious aspiration.  8/3- continue ice and supportive measurement     Parkinson disease (HCC)- (present on admission)  Assessment & Plan  Continue home carbidopa 4 times daily.  Likely his Parkinson's had something to do with his fall today.  No loss of consciousness per patient he states he face planted into the sidewalk where he avulsed his upper teeth.   PT OT evaluations.--placement   No assistive devices at home.  Fall precautions given his acute subdural hematoma.    Osteoarthritis of both hands- (present on admission)  Assessment & Plan  History of.    Type 2 diabetes mellitus, without long-term current use of insulin ([facility])- (present on admission)  Assessment & Plan   A1c 6.7  Consistent carbohydrate diet  Patient is able to tolerate oral diet.  Started on his home Metformin 500 twice daily with meals.  It does not appear the patient was hypoglycemic as cause of his fall.         VTE prophylaxis: SCDs/TEDs and pharmacologic prophylaxis contraindicated due to subdural hematoma    I have performed a physical exam and reviewed and updated ROS and Plan today (8/4/2021). In review of yesterday's note (8/3/2021), there are no changes except as documented above.

## 2021-08-04 NOTE — THERAPY
Physical Therapy   Daily Treatment     Patient Name: Dinah Mathur  Age:  65 y.o., Sex:  male  Medical Record #: [id number]  Today's Date: 8/4/2021     Precautions: Fall Risk    Assessment    Pt was pleasant and agreeable to therapy session. Up in chair upon arrival, practiced STS transfers with Judie and vc for correct hand placement and slow and controlled descend. He was able to improve gait distance today with Judie and fww. He presents with slow pace and short shuffling steps, no overt lob at this time. He continues to present below his baseline with impairments in strength, balance, coordination and activity tolerance. Therefore will benefit from continued acute skilled therapy at this time.     Plan    Continue current treatment plan.    DC Equipment Recommendations: Unable to determine at this time  Discharge Recommendations: Recommend post-acute placement for additional physical therapy services prior to discharge home         08/04/21 0842   Other Treatments   Other Treatments Provided STanding dynamic balance with Judie for safety.    Balance   Sitting Balance (Static) Fair +   Sitting Balance (Dynamic) Fair   Standing Balance (Static) Fair   Standing Balance (Dynamic) Fair -   Weight Shift Sitting Good   Weight Shift Standing Fair   Comments with fww, CGA for safety, no overt lob    Gait Analysis   Gait Level Of Assist Minimal Assist   Assistive Device Front Wheel Walker   Distance (Feet) 12   # of Times Distance was Traveled 2   Deviation Bradykinetic;Decreased Base Of Support   Skilled Intervention Verbal Cuing;Sequencing   Comments VC for sequencing with fww. Judie for safety and balance, presents with slow ann and short shuffling steps.    Bed Mobility    Comments up in chair pre and post tx session    Functional Mobility   Sit to Stand Minimal Assist   Bed, Chair, Wheelchair Transfer Minimal Assist   Skilled Intervention Verbal Cuing;Sequencing   Comments Practiced STS with vc for reaching back and  slow controlled descend. Hveer provided for balance and strengthe    Short Term Goals    Short Term Goal # 1 in 6 visits pt to demo bed mobility SPV in order to increase fxnal IND   Goal Outcome # 1 goal not met   Short Term Goal # 2 in 6 visits pt to demo transfers SPV with LRAD in order to increase fxnal IND   Goal Outcome # 2 Goal not met   Short Term Goal # 3 in 6 visits pt to demo ambulation x150' with LRAD in order to increase fxnal IND   Goal Outcome # 3 Goal not met

## 2021-08-04 NOTE — DISCHARGE PLANNING
Anticipated Discharge Disposition:   TBD    Action:    Marlnee with [facility] SNF stated via telephone that she tried to submit for auth, however, not able to as it indicates that patient does not have a SNF benefit.    Contacted Agus with [facility] admissions and he will check with the business office.    RN CM contacted [facility] (reference # [id number]) and patient does have SNF benefit.  Online research as well indicates that patient does have SNF and home health benefit.    DASIA CALLAHAN sent an email to [employer] liaison for assistance.    Barriers to Discharge:    Insurance benefit confirmation  Insurance auth    Plan:    F/U with [facility] with [facility] SNFs.

## 2021-08-05 LAB
GLUCOSE BLD-MCNC: 111 MG/DL (ref 65–99)
GLUCOSE BLD-MCNC: 112 MG/DL (ref 65–99)
GLUCOSE BLD-MCNC: 122 MG/DL (ref 65–99)
GLUCOSE BLD-MCNC: 124 MG/DL (ref 65–99)

## 2021-08-05 PROCEDURE — A9270 NON-COVERED ITEM OR SERVICE: HCPCS | Performed by: NURSE PRACTITIONER

## 2021-08-05 PROCEDURE — 82962 GLUCOSE BLOOD TEST: CPT | Mod: 91

## 2021-08-05 PROCEDURE — 97530 THERAPEUTIC ACTIVITIES: CPT | Mod: CQ

## 2021-08-05 PROCEDURE — 700102 HCHG RX REV CODE 250 W/ 637 OVERRIDE(OP): Performed by: HOSPITALIST

## 2021-08-05 PROCEDURE — 92526 ORAL FUNCTION THERAPY: CPT

## 2021-08-05 PROCEDURE — 700102 HCHG RX REV CODE 250 W/ 637 OVERRIDE(OP): Performed by: NURSE PRACTITIONER

## 2021-08-05 PROCEDURE — A9270 NON-COVERED ITEM OR SERVICE: HCPCS | Performed by: HOSPITALIST

## 2021-08-05 PROCEDURE — 99231 SBSQ HOSP IP/OBS SF/LOW 25: CPT | Performed by: INTERNAL MEDICINE

## 2021-08-05 PROCEDURE — 770006 HCHG ROOM/CARE - MED/SURG/GYN SEMI*

## 2021-08-05 PROCEDURE — 97116 GAIT TRAINING THERAPY: CPT | Mod: CQ

## 2021-08-05 RX ORDER — OXYCODONE HYDROCHLORIDE 5 MG/1
5 TABLET ORAL EVERY 6 HOURS PRN
Status: DISCONTINUED | OUTPATIENT
Start: 2021-08-05 | End: 2021-08-07 | Stop reason: HOSPADM

## 2021-08-05 RX ADMIN — DOCUSATE SODIUM 50 MG AND SENNOSIDES 8.6 MG 2 TABLET: 8.6; 5 TABLET, FILM COATED ORAL at 16:31

## 2021-08-05 RX ADMIN — CARBIDOPA AND LEVODOPA 1 TABLET: 25; 250 TABLET ORAL at 23:29

## 2021-08-05 RX ADMIN — AMITRIPTYLINE HYDROCHLORIDE 25 MG: 25 TABLET, FILM COATED ORAL at 16:31

## 2021-08-05 RX ADMIN — CARBIDOPA AND LEVODOPA 1 TABLET: 25; 250 TABLET ORAL at 06:03

## 2021-08-05 RX ADMIN — CARBIDOPA AND LEVODOPA 1 TABLET: 25; 250 TABLET ORAL at 00:17

## 2021-08-05 RX ADMIN — TAMSULOSIN HYDROCHLORIDE 0.4 MG: 0.4 CAPSULE ORAL at 04:26

## 2021-08-05 RX ADMIN — METFORMIN HYDROCHLORIDE 500 MG: 500 TABLET ORAL at 16:31

## 2021-08-05 RX ADMIN — OXYCODONE 5 MG: 5 TABLET ORAL at 23:55

## 2021-08-05 RX ADMIN — CARBIDOPA AND LEVODOPA 1 TABLET: 25; 250 TABLET ORAL at 16:31

## 2021-08-05 RX ADMIN — ACETAMINOPHEN 650 MG: 325 TABLET, FILM COATED ORAL at 21:16

## 2021-08-05 RX ADMIN — ROSUVASTATIN CALCIUM 40 MG: 20 TABLET, FILM COATED ORAL at 04:26

## 2021-08-05 RX ADMIN — METFORMIN HYDROCHLORIDE 500 MG: 500 TABLET ORAL at 06:02

## 2021-08-05 RX ADMIN — CARBIDOPA AND LEVODOPA 1 TABLET: 25; 250 TABLET ORAL at 12:02

## 2021-08-05 ASSESSMENT — COGNITIVE AND FUNCTIONAL STATUS - GENERAL
STANDING UP FROM CHAIR USING ARMS: A LITTLE
MOVING FROM LYING ON BACK TO SITTING ON SIDE OF FLAT BED: A LITTLE
SUGGESTED CMS G CODE MODIFIER MOBILITY: CK
TURNING FROM BACK TO SIDE WHILE IN FLAT BAD: A LITTLE
WALKING IN HOSPITAL ROOM: A LITTLE
CLIMB 3 TO 5 STEPS WITH RAILING: A LOT
MOBILITY SCORE: 16
MOVING TO AND FROM BED TO CHAIR: A LOT

## 2021-08-05 ASSESSMENT — GAIT ASSESSMENTS
GAIT LEVEL OF ASSIST: MINIMAL ASSIST
ASSISTIVE DEVICE: FRONT WHEEL WALKER
DISTANCE (FEET): 20
DEVIATION: BRADYKINETIC;SHUFFLED GAIT;OTHER (COMMENT)

## 2021-08-05 NOTE — THERAPY
Speech Language Pathology  Daily Treatment     Patient Name: Dinah Mathur  Age:  65 y.o., Sex:  male  Medical Record #: [id number]  Today's Date: 8/5/2021     Precautions  Precautions: Fall Risk, Swallow Precautions ( See Comments)  Comments: new dx of dementia    Assessment  The pt was seen upright in leggett's position. SLP regulated trials of ice chips, thin liquids by spoon/cup/straw, applesauce, pudding and mixed fruit cocktail. Appropriate oral acceptance and sufficient containment. Adequate suction from the straw.  Mastication time of 17-19 seconds of the fruit cocktail with pt primarily chewing on the right side. Complete oral clearance.  Presumed slowed oral manipulation and lingual motion.  No overt signs of aspiration noted across all consistencies. Voice was dry pre and post oral intake.    The pt presents with improved oropharyngeal swallow function this date. Oral deficits are 2/2 loose dentition in the left oral cavity d/t fall. Pt in agreement to begin more conservative solids (versus SB6) since he still endorses dental sensitivity. A short term modified diet is indicated at this time.     RECOMMENDATIONS:  1.  Minced & Moist solids (Level 5), Thin liquids (Level 0)         - Float meds with puree        - Provide feeding assistance during meals        - Slow rate of intake, small bites/sips  2.  Oral hygiene Q6h  3.  SLP to follow for dysphagia management.     Results and recs d/w pt and RN. Thank you.    Plan    Continue current treatment plan.    Discharge Recommendations: Anticipate that the patient will have no further speech therapy needs after discharge from the hospital    Subjective    RN cleared the pt for speech tx, no acute changes reported. The pt was received awake and alert in bed. He was pleasant and cooperative. Pt reported that he did not like the current diet.     Objective       08/05/21 1145   Total Time Spent   Total Time Spent (Mins) 20   Precautions   Precautions Fall  "Risk;Swallow Precautions ( See Comments)   Vitals   O2 Delivery Device None - Room Air   Pain 0 - 10 Group   Therapist Pain Assessment 0;Post Activity Pain Same as Prior to Activity   Dysphagia    Dysphagia X   Positioning / Behavior Modification Modulate Rate or Bite Size   Diet / Liquid Recommendation Thin (0);Minced & Moist (5) - (Dysphagia II)   Skilled Intervention Compensatory Strategies   Recommended Route of Medication Administration   Medication Administration  Float Whole with Puree   Patient / Family Goals   Patient / Family Goal #1 \"Tell them not to put stuff in my food\"   Goal #1 Outcome Progressing as expected   Short Term Goals   Short Term Goal # 1 The patient will utilize swallow strategies during LQ3/MT2 meal items with no overt s/s of aspiration given min A.    Goal Outcome # 1 Goal met, new goal added   Short Term Goal # 1 B  Pt will consume MM5/MT2 without overt signs of aspiration given Judie.   Education Group   Education Provided Dysphagia   Dysphagia Patient Response Patient;Acceptance;Explanation;Verbal Demonstration;Reinforcement Needed   Anticipated Discharge Needs   Discharge Recommendations Anticipate that the patient will have no further speech therapy needs after discharge from the hospital   Therapy Recommendations Upon DC Patient / Family / Caregiver Education         "

## 2021-08-05 NOTE — CARE PLAN
Problem: Knowledge Deficit - Standard  Goal: Patient and family/care givers will demonstrate understanding of plan of care, disease process/condition, diagnostic tests and medications  Outcome: Progressing     Problem: Skin Integrity  Goal: Skin integrity is maintained or improved  Outcome: Progressing     Problem: Fall Risk  Goal: Patient will remain free from falls  Outcome: Progressing     Problem: Pain - Standard  Goal: Alleviation of pain or a reduction in pain to the patient’s comfort goal  Outcome: Progressing   The patient is Stable - Low risk of patient condition declining or worsening    Shift Goals  Clinical Goals: ambulation, discharge planning  Patient Goals: rest  Family Goals: n/a    Progress made toward(s) clinical / shift goals:  Understands poc, denies pain medication, fall precautions in place, calls appropriately.     Patient is not progressing towards the following goals:

## 2021-08-05 NOTE — CARE PLAN
The patient is Stable - Low risk of patient condition declining or worsening    Shift Goals  Clinical Goals: Safety  Patient Goals: Rest  Family Goals: n/a    Progress made toward(s) clinical / shift goals:    Problem: Fall Risk  Goal: Patient will remain free from falls  Outcome: Progressing   Bed alarm on, fall precautions in place  Problem: Pain - Standard  Goal: Alleviation of pain or a reduction in pain to the patient’s comfort goal  Outcome: Progressing   Pt denies pain     Patient is not progressing towards the following goals:

## 2021-08-05 NOTE — PROGRESS NOTES
Hospital Medicine Daily Progress Note    Date of Service  8/5/2021    Chief Complaint  Dinah Mathur is a 65 y.o. male admitted 8/1/2021 with ground level fall, facial trauma.      Hospital Course  65-year-old male past medical Parkinson disease, type 2 diabetes dementia with baseline wandering, caregiver is his brother presented with ground-level fall.  Refer to H&P of Dr. Ballesteros for further details.  On arrival he had active bleeding from his maxillary medial incisor teeth.  CT head with left parafalcine small subdural hematoma.  CT maxillofacial negative for fractures other than avulsed left maxillary teeth.  Left shoulder x-ray unremarkable.  Neurosurgery was consulted and there they did recommend just medical observation.  Repeat CT head in 6 hours no changes from above.      Interval Problem Update  No acute overnight events.    8/3- no event. Remain stable. His lip is very swollen, bloody. Difficult to understand pt. Pt is very emotional otherwise. Remain medical stable. He can be transfer for rehab. Consider to repeat scan if changes     8/4-swelling is improved. He tells me he is doing better. I asked if he wants to go home and he said no. I tried to call brother twice and he did not answer. I left voice mail. I will keep trying again. Per last note from CM possible pt has SNF benefits. Pending dispo planing. Medical cleared.     8/5- no event overnight. Pt feeling better. Swelling on his lip is better.  Spoke to his brother who cannot take care of patient before Saturday.  Patient is unsafe to be discharged.  High risk of falling, constipation, worsening intracranial bleed.  PT OT have strongly recommended placement. Pt was left one hour alone at home, while his brother went to [employer] and he did have the fall and the reason why he is here. Will keep pt in the hospital. Will continue monitoring. PT/OT as we can, and transition home on Saturday. I appreciate Sarah who is helping a lot on dispo plan.      I have personally seen and examined the patient at bedside. I discussed the plan of care with patient. RN, , brother. charge nurse, pharmacist     Consultants/Specialty  General Surgery: No interventions.   Neurosurgery: No need for followup HCT unless symptoms develop. OK for Lovenox.  Facial Trauma: Will followup with patient as an outpatient.        Code Status  Full Code    Disposition  Patient is medically cleared.   Anticipate discharge to SNF given PT evals.  I have placed the appropriate orders for post-discharge needs.    Review of Systems  All systems reviewed and negative except as noted per above.    Physical Exam  Temp:  [36.2 °C (97.2 °F)-36.9 °C (98.5 °F)] 36.9 °C (98.5 °F)  Pulse:  [61-70] 61  Resp:  [16-17] 17  BP: (106-128)/(52-65) 106/52  SpO2:  [95 %-99 %] 95 %    General: No acute distress  HEENT upper lip swollen left greater than right, several teeth avulsed, pupils equal round reactive to light. Upper lip very swollen and extensive bruises   Neck: No JVD  Chest: Respirations are unlabored  Cardiac: Physiologic S1 and S2  Abdomen: Soft, nontender, nondistended  Extremities: Without clubbing, cyanosis or edema  Neuro: Cranial nerves II through XII are grossly intact. Mumbling and difficult to understand from lip swelling . Able to understand little better   Psych: No anxiety, judgement intact.        Current Facility-Administered Medications:   •  amitriptyline (ELAVIL) tablet 25 mg, 25 mg, Oral, Q EVENING, Lupis Walker M.D., 25 mg at 08/04/21 1728  •  carbidopa-levodopa (SINEMET)  MG tablet 1 tablet, 1 tablet, Oral, Q6HRS, Lupis Walker M.D., 1 tablet at 08/05/21 0603  •  tamsulosin (FLOMAX) capsule 0.4 mg, 0.4 mg, Oral, DAILY, Lupis Walker M.D., 0.4 mg at 08/05/21 0426  •  rosuvastatin (CRESTOR) tablet 40 mg, 40 mg, Oral, DAILY, Lupis Walker M.D., 40 mg at 08/05/21 0426  •  metFORMIN (GLUCOPHAGE) tablet 500 mg, 500 mg, Oral, BID, Lupis Walker M.D., 500 mg at 08/05/21  0602  •  senna-docusate (PERICOLACE or SENOKOT S) 8.6-50 MG per tablet 2 tablet, 2 tablet, Oral, BID, 2 tablet at 08/04/21 0508 **AND** polyethylene glycol/lytes (MIRALAX) PACKET 1 Packet, 1 Packet, Oral, QDAY PRN **AND** magnesium hydroxide (MILK OF MAGNESIA) suspension 30 mL, 30 mL, Oral, QDAY PRN, 30 mL at 08/04/21 0508 **AND** bisacodyl (DULCOLAX) suppository 10 mg, 10 mg, Rectal, QDAY PRN, Lupis Walker M.D.  •  acetaminophen (Tylenol) tablet 650 mg, 650 mg, Oral, Q6HRS PRN, Lupis Walker M.D.  •  enalaprilat (VASOTEC) injection 1.25 mg, 1.25 mg, Intravenous, Q6HRS PRN, Lupis Walker M.D.  •  labetalol (NORMODYNE/TRANDATE) injection 10 mg, 10 mg, Intravenous, Q4HRS PRN, Lupis Walker M.D.  •  ondansetron (ZOFRAN) syringe/vial injection 4 mg, 4 mg, Intravenous, Q4HRS PRN, Lupis Walker M.D.  •  ondansetron (ZOFRAN ODT) dispertab 4 mg, 4 mg, Oral, Q4HRS PRN, Lupis Walker M.D.  •  insulin regular (HumuLIN R,NovoLIN R) injection, 1-6 Units, Subcutaneous, 4X/DAY ACHS, 2 Units at 08/02/21 1217 **AND** POC blood glucose manual result, , , Q AC AND BEDTIME(S) **AND** NOTIFY MD and PharmD, , , Once **AND** glucose 4 g chewable tablet 16 g, 16 g, Oral, Q15 MIN PRN **AND** dextrose 50% (D50W) injection 50 mL, 50 mL, Intravenous, Q15 MIN PRN, Lupis Walker M.D.      Fluids    Intake/Output Summary (Last 24 hours) at 8/5/2021 0921  Last data filed at 8/4/2021 1230  Gross per 24 hour   Intake 240 ml   Output --   Net 240 ml       Laboratory  Recent Labs     08/03/21 0318 08/04/21  0238   WBC 9.4 6.7   RBC 4.69* 4.43*   HEMOGLOBIN 13.1* 12.2*   HEMATOCRIT 40.6* 38.2*   MCV 86.6 86.2   MCH 27.9 27.5   MCHC 32.3* 31.9*   RDW 44.9 43.6   PLATELETCT 163* 164   MPV 9.7 9.8     Recent Labs     08/03/21 0318   SODIUM 137   POTASSIUM 3.5*   CHLORIDE 102   CO2 22   GLUCOSE 138*   BUN 34*   CREATININE 0.84   CALCIUM 9.2                   Imaging  DX-FEMUR-2+ LEFT   Final Result      No acute osseous abnormality.      CT-HEAD W/O    Final Result      No significant change in right temporal and small amount left parafalcine subarachnoid hemorrhage.      DX-SHOULDER 2+ LEFT   Final Result      1.  No acute left shoulder fracture or dislocation.      2.  Moderate degenerative change of the left AC joint.      CT-HEAD W/O   Final Result      1.  Small left parafalcine subdural hematoma in the left parietal region.      2.  No mass effect.      3.  No calvarial fracture.      Findings were discussed with MI VASQUES M.D. on 8/1/2021 9:56 AM.      CT-MAXILLOFACIAL W/O PLUS RECONS   Final Result         1.  Multiple missing anterior left-sided maxillary teeth with overlying soft tissue hematoma.      2.  No other maxillary fracture.      3.  No mandibular fracture or TMJ dislocation.      4.  Otherwise negative CT examination of the maxillofacial bones.      5.  Findings were discussed with MI VASQUES M.D. on 8/1/2021 9:55 AM.              Assessment/Plan  Hypokalemia- (present on admission)  Assessment & Plan  Hypokalemia  - repleting         Facial trauma- (present on admission)  Assessment & Plan  No obvious fracture   Ct maxillofacial 8/2   2.  No other maxillary fracture.     3.  No mandibular fracture or TMJ dislocation.  8/5-swelling improving        HLD (hyperlipidemia)- (present on admission)  Assessment & Plan  Continue home crestor.      Benign prostatic hyperplasia without lower urinary tract symptoms- (present on admission)  Assessment & Plan  Continue home Flomax.  BladderScan as needed for inability to void.    SDH (subdural hematoma) (HCC)- (present on admission)  Assessment & Plan  Secondary to trauma.  CT head with left parafalcine small subdural hematoma left parietal.  Neurochecks every 1 hour.  Admitted to neurology unit.  Repeat CT head in 6 hours to ensure stability.  Patient takes aspirin 81 mg p.o. daily but no other blood thinners.  Avoid DVT prophylaxis chemically.  SCDs only.  No obvious focal neurological deficit  noted on exam.  ERP has discussed with neurosurgeon on-call as well as trauma surgeon.  Both state medical admission.  8/3- remain stable. No changes. Medical cleared for rehab   8/5- pt not safe to be discharge on his own. No benefits from SNF. Transition home on Saturday     Tooth avulsion- (present on admission)  Assessment & Plan  ERP has spoken with oral surgeon.  Plan is for outpatient evaluation of avulsed maxillary medial incisors as well as evaluation of his mandibular medial incisor loose teeth.  Cold popsicles ordered to control current oozing bleeding.  Placed on puréed thin liquid diet.  Patient has normal speech on exam no obvious aspiration.  8/3- continue ice and supportive measurement     Parkinson disease (HCC)- (present on admission)  Assessment & Plan  Continue home carbidopa 4 times daily.  Likely his Parkinson's had something to do with his fall today.  No loss of consciousness per patient he states he face planted into the sidewalk where he avulsed his upper teeth.   PT OT evaluations.--placement   No assistive devices at home.  Fall precautions given his acute subdural hematoma.    Osteoarthritis of both hands- (present on admission)  Assessment & Plan  History of.    Type 2 diabetes mellitus, without long-term current use of insulin (HCC)- (present on admission)  Assessment & Plan   A1c 6.7  Consistent carbohydrate diet  Patient is able to tolerate oral diet.  Started on his home Metformin 500 twice daily with meals.  It does not appear the patient was hypoglycemic as cause of his fall.         VTE prophylaxis: SCDs/TEDs and pharmacologic prophylaxis contraindicated due to subdural hematoma    I have performed a physical exam and reviewed and updated ROS and Plan today (8/5/2021). In review of yesterday's note (8/4/2021), there are no changes except as documented above.

## 2021-08-05 NOTE — DISCHARGE PLANNING
Anticipated Discharge Disposition:   Home  Home Health    Action:    Informed patient's brother Florinda that SNFs declined patient and his insurance is showing with the SNFs that patient does not have SNF benefits. His spouse stated that patient has had [facility] in the past.  Florinda stated he doesn't want patient to go back to their home.  Explained Important Message from Medicare that they have the right to appeal the discharge.  He stated he could come to the hospital this a.m. at 0830 to speak to physician and RN ANJELICA Dengalte msg to Dr. Dumont.    Barriers to Discharge:    Pts brother does not want patient to go back to his house    Plan:    Meet with patient, his brother this a.m.

## 2021-08-05 NOTE — THERAPY
"Physical Therapy   Daily Treatment     Patient Name: Dinah Mathur  Age:  65 y.o., Sex:  male  Medical Record #: [id number]  Today's Date: 8/5/2021     Precautions: Fall Risk, Swallow Precautions ( See Comments)    Assessment    Pt progressing as expected w/ therapy. Pt very rigid and needing extra assist for bed mobility w/ repetitive trunk rotation to loosen up prior to standing. Pt gait very slow and shuffled. Pt w/ an instance of freezing when crossing threshold into BR. He did have good safety mechanics w/ hand placement during transfers. Pt w/ quick fatigue and began to get tremulous during gait and had a hard time sequencing pivoting to the chair. Pt continues to be a fall risk and will benefit from post acute placement.    Plan    Continue current treatment plan.    DC Equipment Recommendations: Unable to determine at this time  Discharge Recommendations: Recommend post-acute placement for additional physical therapy services prior to discharge home      Subjective    \"I just a walker at home, it's actually here in the BR.\"     Objective       08/05/21 1049   Precautions   Precautions Fall Risk;Swallow Precautions ( See Comments)   Comments PD w/ new Dementia dx   Balance   Sitting Balance (Static) Fair +   Sitting Balance (Dynamic) Fair   Standing Balance (Static) Fair -   Standing Balance (Dynamic) Fair -   Weight Shift Sitting Fair   Weight Shift Standing Fair   Gait Analysis   Gait Level Of Assist Minimal Assist   Assistive Device Front Wheel Walker   Distance (Feet) 20   # of Times Distance was Traveled 2   Deviation Bradykinetic;Shuffled Gait;Other (Comment)   Comments Pt w/ instances of freezing when crossing threshold to BR. Pt w/ increased tremulousness as he fatigued.   Bed Mobility    Supine to Sit Minimal Assist   Sit to Supine   (NT up in chair)   Scooting Minimal Assist   Rolling Minimal Assist to Rt.   Comments Pt very rigid in bed and needing assist w/ rolling and scooting.   Functional " Mobility   Sit to Stand Minimal Assist   Bed, Chair, Wheelchair Transfer Minimal Assist   Transfer Method Other (Comments)  (Ambulating)   Mobility With FWW. Pt w/ good hand placement during transfers. Pt needing extra time and assist w/ balance during pivot.   Short Term Goals    Short Term Goal # 1 in 6 visits pt to demo bed mobility SPV in order to increase fxnal IND   Goal Outcome # 1 goal not met   Short Term Goal # 2 in 6 visits pt to demo transfers SPV with LRAD in order to increase fxnal IND   Goal Outcome # 2 Goal not met   Short Term Goal # 3 in 6 visits pt to demo ambulation x150' with LRAD in order to increase fxnal IND   Goal Outcome # 3 Goal not met

## 2021-08-05 NOTE — DISCHARGE PLANNING
Anticipated Discharge Disposition:   Home  [facility]    Action:    Pts brotherFlorinda met with RN SINDY and Dr. Dumont this a.m.  He is unable to care for patient at home until Saturday because he has a  to go to.  He can be here at 1000 this Saturday.  He is agreeable to patient going back home with [facility].  He also requested additional assistance with obtaining services patient may qualify for at home to prevent readmission.  Pt agreeable to returning home with [facility] and additional services he may be eligible for at home.  Pt signed the Community Based Care Program Application and it was faxed to [facility].    Choice form faxed to [facility] for [facility].    Barriers to Discharge:    Brother unable to provide care for patient until 21.    Plan:    F/U with [facility] on determination.

## 2021-08-06 PROBLEM — E87.6 HYPOKALEMIA: Status: RESOLVED | Noted: 2021-08-04 | Resolved: 2021-08-06

## 2021-08-06 LAB — GLUCOSE BLD-MCNC: 134 MG/DL (ref 65–99)

## 2021-08-06 PROCEDURE — 97530 THERAPEUTIC ACTIVITIES: CPT | Mod: CQ

## 2021-08-06 PROCEDURE — A9270 NON-COVERED ITEM OR SERVICE: HCPCS | Performed by: HOSPITALIST

## 2021-08-06 PROCEDURE — 99231 SBSQ HOSP IP/OBS SF/LOW 25: CPT | Performed by: INTERNAL MEDICINE

## 2021-08-06 PROCEDURE — 82962 GLUCOSE BLOOD TEST: CPT

## 2021-08-06 PROCEDURE — 770006 HCHG ROOM/CARE - MED/SURG/GYN SEMI*

## 2021-08-06 PROCEDURE — 97116 GAIT TRAINING THERAPY: CPT | Mod: CQ

## 2021-08-06 PROCEDURE — 51798 US URINE CAPACITY MEASURE: CPT

## 2021-08-06 PROCEDURE — 700102 HCHG RX REV CODE 250 W/ 637 OVERRIDE(OP): Performed by: HOSPITALIST

## 2021-08-06 RX ADMIN — TAMSULOSIN HYDROCHLORIDE 0.4 MG: 0.4 CAPSULE ORAL at 05:34

## 2021-08-06 RX ADMIN — ROSUVASTATIN CALCIUM 40 MG: 20 TABLET, FILM COATED ORAL at 05:34

## 2021-08-06 RX ADMIN — CARBIDOPA AND LEVODOPA 1 TABLET: 25; 250 TABLET ORAL at 17:43

## 2021-08-06 RX ADMIN — METFORMIN HYDROCHLORIDE 500 MG: 500 TABLET ORAL at 17:43

## 2021-08-06 RX ADMIN — CARBIDOPA AND LEVODOPA 1 TABLET: 25; 250 TABLET ORAL at 05:34

## 2021-08-06 RX ADMIN — AMITRIPTYLINE HYDROCHLORIDE 25 MG: 25 TABLET, FILM COATED ORAL at 17:43

## 2021-08-06 RX ADMIN — METFORMIN HYDROCHLORIDE 500 MG: 500 TABLET ORAL at 05:34

## 2021-08-06 RX ADMIN — CARBIDOPA AND LEVODOPA 1 TABLET: 25; 250 TABLET ORAL at 12:26

## 2021-08-06 RX ADMIN — DOCUSATE SODIUM 50 MG AND SENNOSIDES 8.6 MG 2 TABLET: 8.6; 5 TABLET, FILM COATED ORAL at 17:43

## 2021-08-06 RX ADMIN — DOCUSATE SODIUM 50 MG AND SENNOSIDES 8.6 MG 2 TABLET: 8.6; 5 TABLET, FILM COATED ORAL at 05:34

## 2021-08-06 ASSESSMENT — GAIT ASSESSMENTS
DEVIATION: BRADYKINETIC;SHUFFLED GAIT
ASSISTIVE DEVICE: FRONT WHEEL WALKER
DISTANCE (FEET): 20
GAIT LEVEL OF ASSIST: MINIMAL ASSIST

## 2021-08-06 ASSESSMENT — COGNITIVE AND FUNCTIONAL STATUS - GENERAL
TURNING FROM BACK TO SIDE WHILE IN FLAT BAD: A LITTLE
MOVING TO AND FROM BED TO CHAIR: A LOT
CLIMB 3 TO 5 STEPS WITH RAILING: A LOT
MOBILITY SCORE: 15
WALKING IN HOSPITAL ROOM: A LITTLE
MOVING FROM LYING ON BACK TO SITTING ON SIDE OF FLAT BED: A LOT
STANDING UP FROM CHAIR USING ARMS: A LITTLE
SUGGESTED CMS G CODE MODIFIER MOBILITY: CK

## 2021-08-06 NOTE — THERAPY
"Physical Therapy   Daily Treatment     Patient Name: Dinah Mathur  Age:  65 y.o., Sex:  male  Medical Record #: [id number]  Today's Date: 8/6/2021     Precautions: Fall Risk, Swallow Precautions ( See Comments)    Assessment    Pt progressing as expected w/ therapy. Pt requiring a lot of time and effort to perform mobility. He had a hard time sequencing bed mobility however states he sleeps in a chair that will stand him. Pt w/ no instances of freezing during gait however his LE's appear tremulous. Pt had most difficulty w/ standing, especially from the toilet. Per notes, pt is going to go back w/ his brother. If brother can assist pt at a Hever level then he can DC home w/ HH however if this is not possible, pt will need post acute placement.    Plan    Continue current treatment plan.    DC Equipment Recommendations: Unable to determine at this time  Discharge Recommendations: Recommend post-acute placement for additional physical therapy services prior to discharge home      Subjective    \"Why am I so weak?\"     Objective       08/06/21 1349   Precautions   Precautions Fall Risk;Swallow Precautions ( See Comments)   Comments PD w/ new Dementia dx   Balance   Sitting Balance (Static) Fair +   Sitting Balance (Dynamic) Fair   Standing Balance (Static) Fair -   Standing Balance (Dynamic) Fair -   Weight Shift Sitting Fair   Weight Shift Standing Fair   Gait Analysis   Gait Level Of Assist Minimal Assist   Assistive Device Front Wheel Walker   Distance (Feet) 20   # of Times Distance was Traveled 2   Deviation Bradykinetic;Shuffled Gait   Comments Pt w/ no instances of freezing today. LE's continue to be tremulous however no signs of buckling or LOB.   Bed Mobility    Supine to Sit Minimal Assist   Sit to Supine Minimal Assist   Scooting Minimal Assist   Rolling Minimal Assist to Rt.   Comments Pt requiring a lot of time and effort to perform. Pt stating he sleeps in a recliner chair at home.   Functional Mobility "   Sit to Stand Minimal Assist   Bed, Chair, Wheelchair Transfer Minimal Assist   Transfer Method Other (Comments)  (Ambulating)   Mobility With FWW. Pt having a hard time w/ performing STS, especially from toilet height.   Short Term Goals    Short Term Goal # 1 in 6 visits pt to demo bed mobility SPV in order to increase fxnal IND   Goal Outcome # 1 goal not met   Short Term Goal # 2 in 6 visits pt to demo transfers SPV with LRAD in order to increase fxnal IND   Goal Outcome # 2 Goal not met   Short Term Goal # 3 in 6 visits pt to demo ambulation x150' with LRAD in order to increase fxnal IND   Goal Outcome # 3 Goal not met

## 2021-08-06 NOTE — CARE PLAN
The patient is Stable - Low risk of patient condition declining or worsening    Shift Goals  Clinical Goals: pain relief, rest  Patient Goals: rest  Family Goals: N/A    Progress made toward(s) clinical / shift goals:  Pt. Resting comfortably in bed. Eating meals with good appetite. Anticipating discharge tomorrow home with his brother.

## 2021-08-06 NOTE — DISCHARGE INSTRUCTIONS
Discharge Instructions per Cristi Dumont M.D.  Stop aspirin  Follow up with primary care within 2 weeks  Follow up with neurosurgery within 1-2 weeks   Follow up with dentist and oral surgery     DIET: healthy     ACTIVITY: as tolerated     DIAGNOSIS: subdural hematoma    Return to ER if confusion, fever, focal weakness, vision changes, speech changes, severe headache, unusual bleeding, chest pain, cough, shortness of breath, unusual bleeding, abdominal pain                Discharge Instructions    Discharged to other by medical transportation with escort. Discharged via wheelchair, hospital escort: Yes.  Special equipment needed: Not Applicable    Be sure to schedule a follow-up appointment with your primary care doctor or any specialists as instructed.     Discharge Plan:   Diet Plan: Discussed  Activity Level: Discussed  Confirmed Follow up Appointment: Patient to Call and Schedule Appointment  Confirmed Symptoms Management: Discussed  Medication Reconciliation Updated: Yes    I understand that a diet low in cholesterol, fat, and sodium is recommended for good health. Unless I have been given specific instructions below for another diet, I accept this instruction as my diet prescription.   Other diet: Level 3, mildly thick liquids    Special Instructions: None    · Is patient discharged on Warfarin / Coumadin?   No     Depression / Suicide Risk    As you are discharged from this [facility], it is important to learn how to keep safe from harming yourself.    Recognize the warning signs:  · Abrupt changes in personality, positive or negative- including increase in energy   · Giving away possessions  · Change in eating patterns- significant weight changes-  positive or negative  · Change in sleeping patterns- unable to sleep or sleeping all the time   · Unwillingness or inability to communicate  · Depression  · Unusual sadness, discouragement and loneliness  · Talk of wanting to die  · Neglect of  personal appearance   · Rebelliousness- reckless behavior  · Withdrawal from people/activities they love  · Confusion- inability to concentrate     If you or a loved one observes any of these behaviors or has concerns about self-harm, here's what you can do:  · Talk about it- your feelings and reasons for harming yourself  · Remove any means that you might use to hurt yourself (examples: pills, rope, extension cords, firearm)  · Get professional help from the community (Mental Health, Substance Abuse, psychological counseling)  · Do not be alone:Call your Safe Contact- someone whom you trust who will be there for you.  · Call your local CRISIS HOTLINE 952-0407 or 434-836-0531  · Call your local Children's Mobile Crisis Response Team [place] (358) 055-8301 or www.[facility]  · Call the toll free National Suicide Prevention Hotlines   · National Suicide Prevention Lifeline 844-345-IHNG (5394)  · National Hope Line Network 800-SUICIDE (871-4711)

## 2021-08-06 NOTE — CARE PLAN
The patient is Stable - Low risk of patient condition declining or worsening    Shift Goals  Clinical Goals: pain relief  Patient Goals: pain relief  Family Goals: N/A    Progress made toward(s) clinical / shift goals:  Patient medicated with PRN pain medications, heat packs provided.     Problem: Knowledge Deficit - Standard  Goal: Patient and family/care givers will demonstrate understanding of plan of care, disease process/condition, diagnostic tests and medications  Outcome: Progressing     Problem: Fall Risk  Goal: Patient will remain free from falls  Outcome: Progressing       Patient is not progressing towards the following goals:

## 2021-08-06 NOTE — PROGRESS NOTES
Hospital Medicine Daily Progress Note    Date of Service  2021    Chief Complaint  Dinah Mathur is a 65 y.o. male admitted 2021 with ground level fall, facial trauma.      Hospital Course  65-year-old male past medical Parkinson disease, type 2 diabetes dementia with baseline wandering, caregiver is his brother presented with ground-level fall.  Refer to H&P of Dr. Ballesteros for further details.  On arrival he had active bleeding from his maxillary medial incisor teeth.  CT head with left parafalcine small subdural hematoma.  CT maxillofacial negative for fractures other than avulsed left maxillary teeth.  Left shoulder x-ray unremarkable.  Neurosurgery was consulted and there they did recommend just medical observation.  Repeat CT head in 6 hours no changes from above. He remained stable. Speech and strength had improved some. Also facial swelling has improved. PT/OT did recommend placement but patient has no SNF benefits. Home health was resumed to continue therapy at home. Brother was not able to pick him up before 2021 because of family/ arrangement for other family members. Home health arranged with . Referral for neurosurgery placed for outpatient follow up. They to determine when pt can restart aspirin.       Interval Problem Update  No acute overnight events.    8/3- no event. Remain stable. His lip is very swollen, bloody. Difficult to understand pt. Pt is very emotional otherwise. Remain medical stable. He can be transfer for rehab. Consider to repeat scan if changes     -swelling is improved. He tells me he is doing better. I asked if he wants to go home and he said no. I tried to call brother twice and he did not answer. I left voice mail. I will keep trying again. Per last note from CM possible pt has SNF benefits. Pending dispo planing. Medical cleared.     - no event overnight. Pt feeling better. Swelling on his lip is better.  Spoke to his brother who cannot  take care of patient before Saturday.  Patient is unsafe to be discharged.  High risk of falling, constipation, worsening intracranial bleed.  PT OT have strongly recommended placement. Pt was left one hour alone at home, while his brother went to [place] and he did have the fall and the reason why he is here. Will keep pt in the hospital. Will continue monitoring. PT/OT as we can, and transition home on Saturday. I appreciate Sarah who is helping a lot on dispo plan.     8/6-remain stable. No changes. Speech has improved some. Swelling improved. He also tells me that he is feeling better. Plan to discharge early morning.     I have personally seen and examined the patient at bedside. I discussed the plan of care with patient. RN, , brother. charge nurse, pharmacist     Consultants/Specialty  General Surgery: No interventions.   Neurosurgery: No need for followup HCT unless symptoms develop. OK for Lovenox.  Facial Trauma: Will followup with patient as an outpatient.        Code Status  Full Code    Disposition  Patient is medically cleared.   Anticipate discharge to SNF given PT evals.  I have placed the appropriate orders for post-discharge needs.    Review of Systems  All systems reviewed and negative except as noted per above.    Physical Exam  Temp:  [36.3 °C (97.3 °F)-36.4 °C (97.6 °F)] 36.4 °C (97.6 °F)  Pulse:  [51-66] 56  Resp:  [14-17] 16  BP: (103-108)/(57-62) 103/58  SpO2:  [93 %-98 %] 96 %    General: No acute distress  HEENT upper lip swollen left greater than right, several teeth avulsed, pupils equal round reactive to light. Upper lip very swollen and extensive bruises   Neck: No JVD  Chest: Respirations are unlabored  Cardiac: Physiologic S1 and S2  Abdomen: Soft, nontender, nondistended  Extremities: Without clubbing, cyanosis or edema  Neuro: Cranial nerves II through XII are grossly intact. Mumbling and difficult to understand from lip swelling . Able to understand little better    Psych: No anxiety, judgement intact.        Current Facility-Administered Medications:   •  oxyCODONE immediate-release (ROXICODONE) tablet 5 mg, 5 mg, Oral, Q6HRS PRN, NITA [place], 5 mg at 08/05/21 2355  •  amitriptyline (ELAVIL) tablet 25 mg, 25 mg, Oral, Q EVENING, Lupis Walker M.D., 25 mg at 08/05/21 1631  •  carbidopa-levodopa (SINEMET)  MG tablet 1 tablet, 1 tablet, Oral, Q6HRS, Lupis Walker M.D., 1 tablet at 08/06/21 0534  •  tamsulosin (FLOMAX) capsule 0.4 mg, 0.4 mg, Oral, DAILY, Lupis Walker M.D., 0.4 mg at 08/06/21 0534  •  rosuvastatin (CRESTOR) tablet 40 mg, 40 mg, Oral, DAILY, Lupis Walker M.D., 40 mg at 08/06/21 0534  •  metFORMIN (GLUCOPHAGE) tablet 500 mg, 500 mg, Oral, BID, Lupis Walker M.D., 500 mg at 08/06/21 0534  •  senna-docusate (PERICOLACE or SENOKOT S) 8.6-50 MG per tablet 2 tablet, 2 tablet, Oral, BID, 2 tablet at 08/06/21 0534 **AND** polyethylene glycol/lytes (MIRALAX) PACKET 1 Packet, 1 Packet, Oral, QDAY PRN **AND** magnesium hydroxide (MILK OF MAGNESIA) suspension 30 mL, 30 mL, Oral, QDAY PRN, 30 mL at 08/04/21 0508 **AND** bisacodyl (DULCOLAX) suppository 10 mg, 10 mg, Rectal, QDAY PRN, Lupis Walker M.D.  •  acetaminophen (Tylenol) tablet 650 mg, 650 mg, Oral, Q6HRS PRN, Lupis Walker M.D., 650 mg at 08/05/21 2116  •  enalaprilat (VASOTEC) injection 1.25 mg, 1.25 mg, Intravenous, Q6HRS PRN, Lupis Walker M.D.  •  labetalol (NORMODYNE/TRANDATE) injection 10 mg, 10 mg, Intravenous, Q4HRS PRN, Lupis Walker M.D.  •  ondansetron (ZOFRAN) syringe/vial injection 4 mg, 4 mg, Intravenous, Q4HRS PRN, Lupis Walker M.D.  •  ondansetron (ZOFRAN ODT) dispertab 4 mg, 4 mg, Oral, Q4HRS PRN, Lupis Walker M.D.      Fluids    Intake/Output Summary (Last 24 hours) at 8/6/2021 1209  Last data filed at 8/6/2021 0546  Gross per 24 hour   Intake 600 ml   Output 450 ml   Net 150 ml       Laboratory  Recent Labs     08/04/21  0238   WBC 6.7   RBC 4.43*   HEMOGLOBIN 12.2*    HEMATOCRIT 38.2*   MCV 86.2   MCH 27.5   MCHC 31.9*   RDW 43.6   PLATELETCT 164   MPV 9.8                       Imaging  DX-FEMUR-2+ LEFT   Final Result      No acute osseous abnormality.      CT-HEAD W/O   Final Result      No significant change in right temporal and small amount left parafalcine subarachnoid hemorrhage.      DX-SHOULDER 2+ LEFT   Final Result      1.  No acute left shoulder fracture or dislocation.      2.  Moderate degenerative change of the left AC joint.      CT-HEAD W/O   Final Result      1.  Small left parafalcine subdural hematoma in the left parietal region.      2.  No mass effect.      3.  No calvarial fracture.      Findings were discussed with MI VASQUES M.D. on 8/1/2021 9:56 AM.      CT-MAXILLOFACIAL W/O PLUS RECONS   Final Result         1.  Multiple missing anterior left-sided maxillary teeth with overlying soft tissue hematoma.      2.  No other maxillary fracture.      3.  No mandibular fracture or TMJ dislocation.      4.  Otherwise negative CT examination of the maxillofacial bones.      5.  Findings were discussed with MI VASQUES M.D. on 8/1/2021 9:55 AM.              Assessment/Plan  Hypokalemia- (present on admission)  Assessment & Plan  Hypokalemia  - repleting         Facial trauma- (present on admission)  Assessment & Plan  No obvious fracture   Ct maxillofacial 8/2   2.  No other maxillary fracture.     3.  No mandibular fracture or TMJ dislocation.  8/5-swelling improving        HLD (hyperlipidemia)- (present on admission)  Assessment & Plan  Continue home crestor.      Benign prostatic hyperplasia without lower urinary tract symptoms- (present on admission)  Assessment & Plan  Continue home Flomax.  BladderScan as needed for inability to void.    SDH (subdural hematoma) (HCC)- (present on admission)  Assessment & Plan  Secondary to trauma.  CT head with left parafalcine small subdural hematoma left parietal.  Neurochecks every 1 hour.  Admitted to neurology  unit.  Repeat CT head in 6 hours to ensure stability.  Patient takes aspirin 81 mg p.o. daily but no other blood thinners.  Avoid DVT prophylaxis chemically.  SCDs only.  No obvious focal neurological deficit noted on exam.  ERP has discussed with neurosurgeon on-call as well as trauma surgeon.  Both state medical admission.  8/3- remain stable. No changes. Medical cleared for rehab   8/5- pt not safe to be discharge on his own. No benefits from SNF. Transition home on Saturday     Tooth avulsion- (present on admission)  Assessment & Plan  ERP has spoken with oral surgeon.  Plan is for outpatient evaluation of avulsed maxillary medial incisors as well as evaluation of his mandibular medial incisor loose teeth.  Cold popsicles ordered to control current oozing bleeding.  Placed on puréed thin liquid diet.  Patient has normal speech on exam no obvious aspiration.  8/3- continue ice and supportive measurement     Parkinson disease (HCC)- (present on admission)  Assessment & Plan  Continue home carbidopa 4 times daily.  Likely his Parkinson's had something to do with his fall today.  No loss of consciousness per patient he states he face planted into the sidewalk where he avulsed his upper teeth.   PT OT evaluations.--placement   No assistive devices at home.  Fall precautions given his acute subdural hematoma.    Osteoarthritis of both hands- (present on admission)  Assessment & Plan  History of.    Type 2 diabetes mellitus, without long-term current use of insulin ([facility])- (present on admission)  Assessment & Plan   A1c 6.7  Consistent carbohydrate diet  Patient is able to tolerate oral diet.  Started on his home Metformin 500 twice daily with meals.  It does not appear the patient was hypoglycemic as cause of his fall.         VTE prophylaxis: SCDs/TEDs and pharmacologic prophylaxis contraindicated due to subdural hematoma    I have performed a physical exam and reviewed and updated ROS and Plan today (8/6/2021). In  review of yesterday's note (8/5/2021), there are no changes except as documented above.

## 2021-08-06 NOTE — FACE TO FACE
Face to Face Supporting Documentation - Home Health    The encounter with this patient was in whole or in part the primary reason for home health admission.    Date of encounter:   Patient:                    MRN:                       YOB: 2021  Dinah Mathur  [id number]  1955     Home health to see patient for:  Skilled Nursing care for assessment, interventions & education, Physical Therapy evaluation and treatment and Occupational therapy evaluation and treatment    Skilled need for:  Exacerbation of Chronic Disease State parkinson and Recent Deterioration of Health Status mechanical fall    Skilled nursing interventions to include:  Comment: PT/OT, skilled nursing care    Homebound status evidenced by:  Need the aid of supportive devices such as crutches, canes, wheelchairs or walkers, Require the use of special transportation or Needs the assistance of another person in order to leave the home. Leaving home requires a considerable and taxing effort. There is a normal inability to leave the home.    Community Physician to provide follow up care: Pcp Pt States None     Optional Interventions? No      I certify the face to face encounter for this home health care referral meets the CMS requirements and the encounter/clinical assessment with the patient was, in whole, or in part, for the medical condition(s) listed above, which is the primary reason for home health care. Based on my clinical findings: the service(s) are medically necessary, support the need for home health care, and the homebound criteria are met.  I certify that this patient has had a face to face encounter by myself.  Cristi Dumont M.D. - NPI: [id number]

## 2021-08-06 NOTE — DISCHARGE PLANNING
Received Choice form at  08/05 1637  Agency/Facility Name: [facility]  Referral sent per Choice form @ 0894      Resending Referral @ 1000    1246  Agency/Facility Name: [facility]  Outcome: Left Vmail regarding referral

## 2021-08-07 VITALS
TEMPERATURE: 96.9 F | WEIGHT: 174.16 LBS | HEART RATE: 54 BPM | RESPIRATION RATE: 18 BRPM | DIASTOLIC BLOOD PRESSURE: 62 MMHG | OXYGEN SATURATION: 97 % | BODY MASS INDEX: 25.8 KG/M2 | SYSTOLIC BLOOD PRESSURE: 108 MMHG | HEIGHT: 69 IN

## 2021-08-07 PROCEDURE — A9270 NON-COVERED ITEM OR SERVICE: HCPCS | Performed by: HOSPITALIST

## 2021-08-07 PROCEDURE — 700102 HCHG RX REV CODE 250 W/ 637 OVERRIDE(OP): Performed by: HOSPITALIST

## 2021-08-07 PROCEDURE — 99239 HOSP IP/OBS DSCHRG MGMT >30: CPT | Performed by: INTERNAL MEDICINE

## 2021-08-07 RX ADMIN — ROSUVASTATIN CALCIUM 40 MG: 20 TABLET, FILM COATED ORAL at 05:56

## 2021-08-07 RX ADMIN — METFORMIN HYDROCHLORIDE 500 MG: 500 TABLET ORAL at 05:56

## 2021-08-07 RX ADMIN — TAMSULOSIN HYDROCHLORIDE 0.4 MG: 0.4 CAPSULE ORAL at 05:56

## 2021-08-07 RX ADMIN — CARBIDOPA AND LEVODOPA 1 TABLET: 25; 250 TABLET ORAL at 00:07

## 2021-08-07 RX ADMIN — CARBIDOPA AND LEVODOPA 1 TABLET: 25; 250 TABLET ORAL at 05:56

## 2021-08-07 RX ADMIN — DOCUSATE SODIUM 50 MG AND SENNOSIDES 8.6 MG 2 TABLET: 8.6; 5 TABLET, FILM COATED ORAL at 05:56

## 2021-08-07 NOTE — DISCHARGE SUMMARY
Discharge Summary    CHIEF COMPLAINT ON ADMISSION  Chief Complaint   Patient presents with   • T-5000 FALL     BIB [facility].  Fell fell outside the [facility] house.  Face planted and has lost some teeth.  Injury to left shoulder.  No LOC.  Takes a daily 81 mg aspirin       Reason for Admission  Fall  Parkinson and dementia    Admission Date  8/1/2021    CODE STATUS  Full Code    HPI & HOSPITAL COURSE    65-year-old male past medical Parkinson disease, type 2 diabetes dementia with baseline wandering, caregiver is his brother presented on 8/1 with ground-level fall.  Patient cannot remember exactly what happened however he moved so fast and fell, on arrival he had active bleeding from his maxillary medial incisor teeth.  CT head with left parafalcine small subdural hematoma.  CT maxillofacial negative for fractures other than avulsed left maxillary teeth.  Left shoulder x-ray unremarkable.  Neurosurgery was consulted and there they did recommend just medical observation. Repeat CT head in 6 hours no changes from above. He remained stable. Speech and strength had improved some. Also facial swelling has improved.  On admission labs showed mildly elevated hemoglobin with mildly elevated creatinine 1.1, with hydration his labs got better.    PT/OT did recommend placement but patient has no SNF benefits.  The case was discussed with the patient and his brother discussed the SNF/home health, discussed the risk of falls, patient and his brother agreed for home health, home health was resumed to continue therapy at home.    Referral for neurosurgery placed for outpatient follow up. They to determine when pt can restart aspirin.     On the day of discharge the patient is alert and oriented x3 he has bradycardia kinesis, plan of care was discussed with the patient he agreed for discharge and he feels stable.  He has some swelling and bruises on his lip and face.    The patient has Parkinson with dementia, the prognosis is  guarded and the patient is on high risk for falls, patient getting good support and care by his brother, patient is on high risk for readmission and falls.    Therefore, he is discharged in good and stable condition to home with organized home healthcare and close outpatient follow-up.    The patient met 2-midnight criteria for an inpatient stay at the time of discharge.    Discharge Date  08/07/21      FOLLOW UP ITEMS POST DISCHARGE  Follow-up with PCP and neurology for Parkinson and dementia  Follow-up with PCP for home health placement and debility    DISCHARGE DIAGNOSES  Active Problems:    Type 2 diabetes mellitus, without long-term current use of insulin (HCC) POA: Yes    Osteoarthritis of both hands POA: Yes    Parkinson disease (HCC) POA: Yes    Tooth avulsion POA: Yes    SDH (subdural hematoma) (HCC) POA: Yes    Benign prostatic hyperplasia without lower urinary tract symptoms POA: Yes    HLD (hyperlipidemia) POA: Yes    Facial trauma POA: Yes  Resolved Problems:    Hypokalemia POA: Yes      FOLLOW UP  [address] [address] [phone]    Please call and schedule a hospital follow up appointment with a primary care provider as needed. Thank you.     [address]  [facility] [address]  [phone]          MEDICATIONS ON DISCHARGE     Medication List      CHANGE how you take these medications      Instructions   gabapentin 600 MG tablet  What changed:   · how much to take  · how to take this  · when to take this  Commonly known as: NEURONTIN   Take 1 tablet by mouth 3 times daily     metFORMIN 500 MG Tabs  What changed:   · how much to take  · how to take this  · when to take this  Commonly known as: GLUCOPHAGE   Take 1 tablet by mouth twice daily     rosuvastatin 40 MG tablet  What changed:   · how much to take  · how to take this  · when to take this  Commonly known as: CRESTOR   Take 1 tablet by mouth once  daily     tamsulosin 0.4 MG capsule  What changed:   · how much to take  · when to take this  Commonly known as: FLOMAX   Take 1 capsule by mouth once daily        CONTINUE taking these medications      Instructions   amitriptyline 25 MG Tabs  Commonly known as: ELAVIL   Take 1 tablet by mouth every day at bedtime  Dose: 25 mg     carbidopa-levodopa  MG Tabs  Commonly known as: SINEMET   Take 1 tablet by mouth 4 times a day        STOP taking these medications    aspirin 81 MG EC tablet            Allergies  No Known Allergies    DIET  Orders Placed This Encounter   Procedures   • Diet Order Diet: Level 5 - Minced and Moist; Liquid level: Level 0 - Thin; Tray Modifications (optional): SLP - Deliver to Nursing Station     Standing Status:   Standing     Number of Occurrences:   1     Order Specific Question:   Diet:     Answer:   Level 5 - Minced and Moist [24]     Order Specific Question:   Liquid level     Answer:   Level 0 - Thin     Order Specific Question:   Tray Modifications (optional)     Answer:   SLP - Deliver to Nursing Station       ACTIVITY  As tolerated.  Weight bearing as tolerated    CONSULTATIONS  None    PROCEDURES  None    LABORATORY  Lab Results   Component Value Date    SODIUM 137 08/03/2021    POTASSIUM 3.5 (L) 08/03/2021    CHLORIDE 102 08/03/2021    CO2 22 08/03/2021    GLUCOSE 138 (H) 08/03/2021    BUN 34 (H) 08/03/2021    CREATININE 0.84 08/03/2021        Lab Results   Component Value Date    WBC 6.7 08/04/2021    HEMOGLOBIN 12.2 (L) 08/04/2021    HEMATOCRIT 38.2 (L) 08/04/2021    PLATELETCT 164 08/04/2021        Total time of the discharge process exceeds 34 minutes.,,

## 2021-08-07 NOTE — CARE PLAN
The patient is Stable - Low risk of patient condition declining or worsening    Shift Goals  Clinical Goals: Maintain safety, work on d/c paperwork  Patient Goals: Rest  Family Goals: N/A    Progress made toward(s) clinical / shift goals:  Fall precautions in place, complete d/c paperwork today.    Patient is not progressing towards the following goals:

## 2021-08-09 NOTE — DISCHARGE PLANNING
Agency/Facility Name: [facility]   Outcome: Accepted pt per epic response    0830  [facility] Name: [facility]   Outcome: Left Vmail for [place] as pt already DC Home

## 2021-08-16 ENCOUNTER — PHARMACY VISIT (OUTPATIENT)
Dept: PHARMACY | Facility: MEDICAL CENTER | Age: 66
End: 2021-08-16
Payer: COMMERCIAL

## 2021-08-16 PROCEDURE — RXMED WILLOW AMBULATORY MEDICATION CHARGE: Performed by: FAMILY MEDICINE

## 2021-08-17 ENCOUNTER — APPOINTMENT (OUTPATIENT)
Dept: RADIOLOGY | Facility: MEDICAL CENTER | Age: 66
DRG: 177 | End: 2021-08-17
Attending: EMERGENCY MEDICINE
Payer: COMMERCIAL

## 2021-08-17 ENCOUNTER — HOSPITAL ENCOUNTER (INPATIENT)
Facility: MEDICAL CENTER | Age: 66
LOS: 2 days | DRG: 177 | End: 2021-08-19
Attending: EMERGENCY MEDICINE | Admitting: STUDENT IN AN ORGANIZED HEALTH CARE EDUCATION/TRAINING PROGRAM
Payer: COMMERCIAL

## 2021-08-17 DIAGNOSIS — U07.1 PNEUMONIA DUE TO COVID-19 VIRUS: ICD-10-CM

## 2021-08-17 DIAGNOSIS — I95.9 HYPOTENSION, UNSPECIFIED HYPOTENSION TYPE: ICD-10-CM

## 2021-08-17 DIAGNOSIS — R09.02 HYPOXIA: ICD-10-CM

## 2021-08-17 DIAGNOSIS — J12.82 PNEUMONIA DUE TO COVID-19 VIRUS: ICD-10-CM

## 2021-08-17 PROBLEM — J96.01 ACUTE RESPIRATORY FAILURE WITH HYPOXIA (HCC): Status: ACTIVE | Noted: 2021-08-17

## 2021-08-17 LAB
ALBUMIN SERPL BCP-MCNC: 4.3 G/DL (ref 3.2–4.9)
ALBUMIN/GLOB SERPL: 1.2 G/DL
ALP SERPL-CCNC: 96 U/L (ref 30–99)
ALT SERPL-CCNC: 9 U/L (ref 2–50)
ANION GAP SERPL CALC-SCNC: 14 MMOL/L (ref 7–16)
AST SERPL-CCNC: 23 U/L (ref 12–45)
BASOPHILS # BLD AUTO: 1 % (ref 0–1.8)
BASOPHILS # BLD: 0.07 K/UL (ref 0–0.12)
BILIRUB SERPL-MCNC: 0.3 MG/DL (ref 0.1–1.5)
BUN SERPL-MCNC: 29 MG/DL (ref 8–22)
CALCIUM SERPL-MCNC: 9.6 MG/DL (ref 8.5–10.5)
CHLORIDE SERPL-SCNC: 100 MMOL/L (ref 96–112)
CO2 SERPL-SCNC: 23 MMOL/L (ref 20–33)
CREAT SERPL-MCNC: 1.08 MG/DL (ref 0.5–1.4)
EOSINOPHIL # BLD AUTO: 0.11 K/UL (ref 0–0.51)
EOSINOPHIL NFR BLD: 1.6 % (ref 0–6.9)
ERYTHROCYTE [DISTWIDTH] IN BLOOD BY AUTOMATED COUNT: 48.4 FL (ref 35.9–50)
FLUAV RNA SPEC QL NAA+PROBE: NEGATIVE
FLUBV RNA SPEC QL NAA+PROBE: NEGATIVE
GLOBULIN SER CALC-MCNC: 3.5 G/DL (ref 1.9–3.5)
GLUCOSE SERPL-MCNC: 126 MG/DL (ref 65–99)
HCT VFR BLD AUTO: 41.7 % (ref 42–52)
HGB BLD-MCNC: 13 G/DL (ref 14–18)
IMM GRANULOCYTES # BLD AUTO: 0.02 K/UL (ref 0–0.11)
IMM GRANULOCYTES NFR BLD AUTO: 0.3 % (ref 0–0.9)
LACTATE BLD-SCNC: 1.7 MMOL/L (ref 0.5–2)
LYMPHOCYTES # BLD AUTO: 2.35 K/UL (ref 1–4.8)
LYMPHOCYTES NFR BLD: 34.5 % (ref 22–41)
MCH RBC QN AUTO: 27.6 PG (ref 27–33)
MCHC RBC AUTO-ENTMCNC: 31.2 G/DL (ref 33.7–35.3)
MCV RBC AUTO: 88.5 FL (ref 81.4–97.8)
MONOCYTES # BLD AUTO: 0.92 K/UL (ref 0–0.85)
MONOCYTES NFR BLD AUTO: 13.5 % (ref 0–13.4)
NEUTROPHILS # BLD AUTO: 3.35 K/UL (ref 1.82–7.42)
NEUTROPHILS NFR BLD: 49.1 % (ref 44–72)
NRBC # BLD AUTO: 0 K/UL
NRBC BLD-RTO: 0 /100 WBC
PLATELET # BLD AUTO: 216 K/UL (ref 164–446)
PMV BLD AUTO: 9 FL (ref 9–12.9)
POTASSIUM SERPL-SCNC: 3.8 MMOL/L (ref 3.6–5.5)
PROT SERPL-MCNC: 7.8 G/DL (ref 6–8.2)
RBC # BLD AUTO: 4.71 M/UL (ref 4.7–6.1)
RSV RNA SPEC QL NAA+PROBE: NEGATIVE
SARS-COV-2 RNA RESP QL NAA+PROBE: DETECTED
SODIUM SERPL-SCNC: 137 MMOL/L (ref 135–145)
SPECIMEN SOURCE: ABNORMAL
WBC # BLD AUTO: 6.8 K/UL (ref 4.8–10.8)

## 2021-08-17 PROCEDURE — 84145 PROCALCITONIN (PCT): CPT

## 2021-08-17 PROCEDURE — 700105 HCHG RX REV CODE 258: Performed by: EMERGENCY MEDICINE

## 2021-08-17 PROCEDURE — 0241U HCHG SARS-COV-2 COVID-19 NFCT DS RESP RNA 4 TRGT MIC: CPT

## 2021-08-17 PROCEDURE — 94760 N-INVAS EAR/PLS OXIMETRY 1: CPT

## 2021-08-17 PROCEDURE — 8E0ZXY6 ISOLATION: ICD-10-PCS | Performed by: EMERGENCY MEDICINE

## 2021-08-17 PROCEDURE — 99285 EMERGENCY DEPT VISIT HI MDM: CPT

## 2021-08-17 PROCEDURE — 83880 ASSAY OF NATRIURETIC PEPTIDE: CPT

## 2021-08-17 PROCEDURE — 85025 COMPLETE CBC W/AUTO DIFF WBC: CPT

## 2021-08-17 PROCEDURE — C9803 HOPD COVID-19 SPEC COLLECT: HCPCS | Performed by: EMERGENCY MEDICINE

## 2021-08-17 PROCEDURE — 770001 HCHG ROOM/CARE - MED/SURG/GYN PRIV*

## 2021-08-17 PROCEDURE — 80053 COMPREHEN METABOLIC PANEL: CPT

## 2021-08-17 PROCEDURE — 87040 BLOOD CULTURE FOR BACTERIA: CPT

## 2021-08-17 PROCEDURE — 83605 ASSAY OF LACTIC ACID: CPT

## 2021-08-17 PROCEDURE — 71045 X-RAY EXAM CHEST 1 VIEW: CPT

## 2021-08-17 PROCEDURE — 81001 URINALYSIS AUTO W/SCOPE: CPT

## 2021-08-17 PROCEDURE — 99223 1ST HOSP IP/OBS HIGH 75: CPT | Performed by: STUDENT IN AN ORGANIZED HEALTH CARE EDUCATION/TRAINING PROGRAM

## 2021-08-17 PROCEDURE — 86140 C-REACTIVE PROTEIN: CPT

## 2021-08-17 PROCEDURE — 700111 HCHG RX REV CODE 636 W/ 250 OVERRIDE (IP): Performed by: EMERGENCY MEDICINE

## 2021-08-17 RX ORDER — POLYETHYLENE GLYCOL 3350 17 G/17G
1 POWDER, FOR SOLUTION ORAL
Status: DISCONTINUED | OUTPATIENT
Start: 2021-08-17 | End: 2021-08-19 | Stop reason: HOSPADM

## 2021-08-17 RX ORDER — LABETALOL HYDROCHLORIDE 5 MG/ML
10 INJECTION, SOLUTION INTRAVENOUS EVERY 4 HOURS PRN
Status: DISCONTINUED | OUTPATIENT
Start: 2021-08-17 | End: 2021-08-19 | Stop reason: HOSPADM

## 2021-08-17 RX ORDER — INSULIN LISPRO 100 [IU]/ML
1-6 INJECTION, SOLUTION INTRAVENOUS; SUBCUTANEOUS EVERY 6 HOURS
Status: DISCONTINUED | OUTPATIENT
Start: 2021-08-18 | End: 2021-08-19 | Stop reason: HOSPADM

## 2021-08-17 RX ORDER — GUAIFENESIN/DEXTROMETHORPHAN 100-10MG/5
10 SYRUP ORAL EVERY 6 HOURS PRN
Status: DISCONTINUED | OUTPATIENT
Start: 2021-08-17 | End: 2021-08-19 | Stop reason: HOSPADM

## 2021-08-17 RX ORDER — SODIUM CHLORIDE 9 MG/ML
1000 INJECTION, SOLUTION INTRAVENOUS ONCE
Status: ACTIVE | OUTPATIENT
Start: 2021-08-17 | End: 2021-08-18

## 2021-08-17 RX ORDER — ONDANSETRON 4 MG/1
4 TABLET, ORALLY DISINTEGRATING ORAL EVERY 4 HOURS PRN
Status: DISCONTINUED | OUTPATIENT
Start: 2021-08-17 | End: 2021-08-19 | Stop reason: HOSPADM

## 2021-08-17 RX ORDER — DEXAMETHASONE SODIUM PHOSPHATE 10 MG/ML
6 INJECTION, SOLUTION INTRAMUSCULAR; INTRAVENOUS ONCE
Status: COMPLETED | OUTPATIENT
Start: 2021-08-17 | End: 2021-08-17

## 2021-08-17 RX ORDER — ACETAMINOPHEN 500 MG
1000 TABLET ORAL 3 TIMES DAILY
Status: DISCONTINUED | OUTPATIENT
Start: 2021-08-17 | End: 2021-08-19 | Stop reason: HOSPADM

## 2021-08-17 RX ORDER — DEXTROSE MONOHYDRATE 25 G/50ML
50 INJECTION, SOLUTION INTRAVENOUS
Status: DISCONTINUED | OUTPATIENT
Start: 2021-08-17 | End: 2021-08-19 | Stop reason: HOSPADM

## 2021-08-17 RX ORDER — AMOXICILLIN 250 MG
2 CAPSULE ORAL 2 TIMES DAILY
Status: DISCONTINUED | OUTPATIENT
Start: 2021-08-18 | End: 2021-08-19 | Stop reason: HOSPADM

## 2021-08-17 RX ORDER — DEXAMETHASONE 4 MG/1
6 TABLET ORAL DAILY
Status: DISCONTINUED | OUTPATIENT
Start: 2021-08-18 | End: 2021-08-19 | Stop reason: HOSPADM

## 2021-08-17 RX ORDER — ONDANSETRON 2 MG/ML
4 INJECTION INTRAMUSCULAR; INTRAVENOUS EVERY 4 HOURS PRN
Status: DISCONTINUED | OUTPATIENT
Start: 2021-08-17 | End: 2021-08-19 | Stop reason: HOSPADM

## 2021-08-17 RX ORDER — TAMSULOSIN HYDROCHLORIDE 0.4 MG/1
0.4 CAPSULE ORAL DAILY
Status: DISCONTINUED | OUTPATIENT
Start: 2021-08-18 | End: 2021-08-19 | Stop reason: HOSPADM

## 2021-08-17 RX ORDER — SODIUM CHLORIDE, SODIUM LACTATE, POTASSIUM CHLORIDE, AND CALCIUM CHLORIDE .6; .31; .03; .02 G/100ML; G/100ML; G/100ML; G/100ML
30 INJECTION, SOLUTION INTRAVENOUS ONCE
Status: COMPLETED | OUTPATIENT
Start: 2021-08-17 | End: 2021-08-18

## 2021-08-17 RX ORDER — ENALAPRILAT 1.25 MG/ML
1.25 INJECTION INTRAVENOUS EVERY 6 HOURS PRN
Status: DISCONTINUED | OUTPATIENT
Start: 2021-08-17 | End: 2021-08-19 | Stop reason: HOSPADM

## 2021-08-17 RX ORDER — BISACODYL 10 MG
10 SUPPOSITORY, RECTAL RECTAL
Status: DISCONTINUED | OUTPATIENT
Start: 2021-08-17 | End: 2021-08-19 | Stop reason: HOSPADM

## 2021-08-17 RX ORDER — ROSUVASTATIN CALCIUM 20 MG/1
40 TABLET, COATED ORAL DAILY
Status: DISCONTINUED | OUTPATIENT
Start: 2021-08-18 | End: 2021-08-19 | Stop reason: HOSPADM

## 2021-08-17 RX ORDER — CARBIDOPA/LEVODOPA 25MG-250MG
1 TABLET ORAL EVERY 6 HOURS
Status: DISCONTINUED | OUTPATIENT
Start: 2021-08-18 | End: 2021-08-19 | Stop reason: HOSPADM

## 2021-08-17 RX ADMIN — DEXAMETHASONE SODIUM PHOSPHATE 6 MG: 10 INJECTION INTRAMUSCULAR; INTRAVENOUS at 22:56

## 2021-08-17 RX ADMIN — SODIUM CHLORIDE, POTASSIUM CHLORIDE, SODIUM LACTATE AND CALCIUM CHLORIDE 2382 ML: 600; 310; 30; 20 INJECTION, SOLUTION INTRAVENOUS at 21:08

## 2021-08-17 ASSESSMENT — LIFESTYLE VARIABLES
AVERAGE NUMBER OF DAYS PER WEEK YOU HAVE A DRINK CONTAINING ALCOHOL: 0
ON A TYPICAL DAY WHEN YOU DRINK ALCOHOL HOW MANY DRINKS DO YOU HAVE: 0
HAVE PEOPLE ANNOYED YOU BY CRITICIZING YOUR DRINKING: NO
HAVE YOU EVER FELT YOU SHOULD CUT DOWN ON YOUR DRINKING: NO
CONSUMPTION TOTAL: NEGATIVE
EVER FELT BAD OR GUILTY ABOUT YOUR DRINKING: NO
TOTAL SCORE: 0
EVER HAD A DRINK FIRST THING IN THE MORNING TO STEADY YOUR NERVES TO GET RID OF A HANGOVER: NO
HOW MANY TIMES IN THE PAST YEAR HAVE YOU HAD 5 OR MORE DRINKS IN A DAY: 0
DO YOU DRINK ALCOHOL: NO
TOTAL SCORE: 0
TOTAL SCORE: 0

## 2021-08-17 ASSESSMENT — ENCOUNTER SYMPTOMS
GASTROINTESTINAL NEGATIVE: 1
EYES NEGATIVE: 1
MUSCULOSKELETAL NEGATIVE: 1
CARDIOVASCULAR NEGATIVE: 1
PSYCHIATRIC NEGATIVE: 1

## 2021-08-17 ASSESSMENT — FIBROSIS 4 INDEX: FIB4 SCORE: 2.63

## 2021-08-17 ASSESSMENT — PAIN DESCRIPTION - PAIN TYPE: TYPE: ACUTE PAIN

## 2021-08-17 NOTE — ED TRIAGE NOTES
".  Chief Complaint   Patient presents with   • Cough     productive with yellow mucos      Pt to triage via wheel chair. Pt notes he has had a cough \"for awhile\", denies CP or SOB. Pt denies other flu like symptoms. Pt notes he called his PCP and was sent here to r/o pneumonia.    Pt educated on triage process and returned to lobby.   "

## 2021-08-18 LAB
ALBUMIN SERPL BCP-MCNC: 3.6 G/DL (ref 3.2–4.9)
ALBUMIN/GLOB SERPL: 1.2 G/DL
ALP SERPL-CCNC: 86 U/L (ref 30–99)
ALT SERPL-CCNC: 7 U/L (ref 2–50)
ANION GAP SERPL CALC-SCNC: 9 MMOL/L (ref 7–16)
APPEARANCE UR: ABNORMAL
AST SERPL-CCNC: 21 U/L (ref 12–45)
BACTERIA #/AREA URNS HPF: ABNORMAL /HPF
BILIRUB SERPL-MCNC: 0.3 MG/DL (ref 0.1–1.5)
BILIRUB UR QL STRIP.AUTO: NEGATIVE
BUN SERPL-MCNC: 24 MG/DL (ref 8–22)
CALCIUM SERPL-MCNC: 8.5 MG/DL (ref 8.5–10.5)
CHLORIDE SERPL-SCNC: 104 MMOL/L (ref 96–112)
CO2 SERPL-SCNC: 25 MMOL/L (ref 20–33)
COLOR UR: YELLOW
CREAT SERPL-MCNC: 0.91 MG/DL (ref 0.5–1.4)
CRP SERPL HS-MCNC: 0.86 MG/DL (ref 0–0.75)
D DIMER PPP IA.FEU-MCNC: 0.35 UG/ML (FEU) (ref 0–0.5)
EPI CELLS #/AREA URNS HPF: ABNORMAL /HPF
GLOBULIN SER CALC-MCNC: 3 G/DL (ref 1.9–3.5)
GLUCOSE BLD-MCNC: 174 MG/DL (ref 65–99)
GLUCOSE BLD-MCNC: 183 MG/DL (ref 65–99)
GLUCOSE SERPL-MCNC: 162 MG/DL (ref 65–99)
GLUCOSE UR STRIP.AUTO-MCNC: NEGATIVE MG/DL
HYALINE CASTS #/AREA URNS LPF: ABNORMAL /LPF
KETONES UR STRIP.AUTO-MCNC: NEGATIVE MG/DL
LEUKOCYTE ESTERASE UR QL STRIP.AUTO: NEGATIVE
MICRO URNS: ABNORMAL
MUCOUS THREADS #/AREA URNS HPF: ABNORMAL /HPF
NITRITE UR QL STRIP.AUTO: NEGATIVE
NT-PROBNP SERPL IA-MCNC: 17 PG/ML (ref 0–125)
PH UR STRIP.AUTO: 5.5 [PH] (ref 5–8)
POTASSIUM SERPL-SCNC: 4 MMOL/L (ref 3.6–5.5)
PROCALCITONIN SERPL-MCNC: <0.05 NG/ML
PROT SERPL-MCNC: 6.6 G/DL (ref 6–8.2)
PROT UR QL STRIP: 100 MG/DL
RBC # URNS HPF: ABNORMAL /HPF
RBC UR QL AUTO: ABNORMAL
SODIUM SERPL-SCNC: 138 MMOL/L (ref 135–145)
SP GR UR STRIP.AUTO: 1.02
UROBILINOGEN UR STRIP.AUTO-MCNC: 0.2 MG/DL
WBC #/AREA URNS HPF: ABNORMAL /HPF

## 2021-08-18 PROCEDURE — 770001 HCHG ROOM/CARE - MED/SURG/GYN PRIV*

## 2021-08-18 PROCEDURE — 85379 FIBRIN DEGRADATION QUANT: CPT

## 2021-08-18 PROCEDURE — A9270 NON-COVERED ITEM OR SERVICE: HCPCS | Performed by: STUDENT IN AN ORGANIZED HEALTH CARE EDUCATION/TRAINING PROGRAM

## 2021-08-18 PROCEDURE — 82962 GLUCOSE BLOOD TEST: CPT | Mod: 91

## 2021-08-18 PROCEDURE — 700102 HCHG RX REV CODE 250 W/ 637 OVERRIDE(OP): Performed by: STUDENT IN AN ORGANIZED HEALTH CARE EDUCATION/TRAINING PROGRAM

## 2021-08-18 PROCEDURE — 36415 COLL VENOUS BLD VENIPUNCTURE: CPT

## 2021-08-18 PROCEDURE — 80053 COMPREHEN METABOLIC PANEL: CPT

## 2021-08-18 PROCEDURE — 99233 SBSQ HOSP IP/OBS HIGH 50: CPT | Performed by: INTERNAL MEDICINE

## 2021-08-18 RX ORDER — GABAPENTIN 600 MG/1
600 TABLET ORAL 3 TIMES DAILY
COMMUNITY

## 2021-08-18 RX ORDER — TAMSULOSIN HYDROCHLORIDE 0.4 MG/1
0.4 CAPSULE ORAL
COMMUNITY
End: 2023-02-28

## 2021-08-18 RX ORDER — ROSUVASTATIN CALCIUM 40 MG/1
40 TABLET, COATED ORAL DAILY
COMMUNITY
End: 2023-02-28

## 2021-08-18 RX ADMIN — ROSUVASTATIN CALCIUM 40 MG: 20 TABLET, FILM COATED ORAL at 08:21

## 2021-08-18 RX ADMIN — TAMSULOSIN HYDROCHLORIDE 0.4 MG: 0.4 CAPSULE ORAL at 08:21

## 2021-08-18 RX ADMIN — ACETAMINOPHEN 1000 MG: 500 TABLET, FILM COATED ORAL at 01:16

## 2021-08-18 RX ADMIN — CARBIDOPA AND LEVODOPA 1 TABLET: 25; 250 TABLET ORAL at 01:17

## 2021-08-18 RX ADMIN — CARBIDOPA AND LEVODOPA 1 TABLET: 25; 250 TABLET ORAL at 12:56

## 2021-08-18 RX ADMIN — DEXAMETHASONE 6 MG: 4 TABLET ORAL at 09:49

## 2021-08-18 RX ADMIN — CARBIDOPA AND LEVODOPA 1 TABLET: 25; 250 TABLET ORAL at 08:21

## 2021-08-18 RX ADMIN — CARBIDOPA AND LEVODOPA 1 TABLET: 25; 250 TABLET ORAL at 18:08

## 2021-08-18 RX ADMIN — ACETAMINOPHEN 1000 MG: 500 TABLET, FILM COATED ORAL at 08:21

## 2021-08-18 ASSESSMENT — ENCOUNTER SYMPTOMS
SHORTNESS OF BREATH: 0
PALPITATIONS: 0
GASTROINTESTINAL NEGATIVE: 1
WEAKNESS: 1
EYES NEGATIVE: 1
MUSCULOSKELETAL NEGATIVE: 1
SPUTUM PRODUCTION: 1
PSYCHIATRIC NEGATIVE: 1
ORTHOPNEA: 0
COUGH: 1

## 2021-08-18 ASSESSMENT — PAIN DESCRIPTION - PAIN TYPE
TYPE: ACUTE PAIN
TYPE: ACUTE PAIN

## 2021-08-18 NOTE — DISCHARGE PLANNING
Received call from Reinaldo at [facility] letting us know pt is currently in service with their facility.

## 2021-08-18 NOTE — ASSESSMENT & PLAN NOTE
Last A1c 2 weeks ago at 6.7  Hold metformin for now cont on SSI   Diabetic diet  Frequent Accu-Cheks

## 2021-08-18 NOTE — ASSESSMENT & PLAN NOTE
Secondary to Covid pneumonia  Requiring 2 L with desaturation  RT protocol  Cont on dexamethasone   Will check a D-dimer   Covid pneumonia  Frequent incentive spirometer and pronation  Up to chair for all meals  Titrate oxygen as tolerable  If worsen consider ID consult for Remdesivir or tocilizumab

## 2021-08-18 NOTE — PROGRESS NOTES
[facility] Medicine Daily Progress Note    Date of Service  8/18/2021    Chief Complaint  Dinah Mathur is a 65 y.o. male admitted 8/17/2021 with shortness of breath.      Hospital Course  This is a 64 y/o F with PMHX of type 2 diabetes, Parkinson's, dementia baseline, and recent hospital discharge on 8/7/2021 for subdural hematoma secondary to ground-level fall, admitted on 8/17/21 after presenting to ER complaining of shortness oif breath and  Productive couhg. In ER she was found to be hypoxic, hypotensive and tachycardic. Also   Covid nasopharyngeal swab positive and chest x-ray with opacities at the lung bases  Patient admitted for further treatment.     Interval Problem Update  Patient seen and examined, resting in bed, no overnight events, afebrile, no nausea or vomiting. Still feeling SOB and having productive cough.  Requiring 2-3 L of O2       Consultants/Specialty  None     Code Status  Full Code    Disposition  Patient is not medically cleared.   Anticipate discharge to TBD .  I have placed the appropriate orders for post-discharge needs.    Review of Systems  Review of Systems   Constitutional: Positive for malaise/fatigue.   HENT: Negative for ear pain and hearing loss.    Eyes: Negative.    Respiratory: Positive for cough and sputum production. Negative for shortness of breath.    Cardiovascular: Negative for chest pain, palpitations and orthopnea.   Gastrointestinal: Negative.    Genitourinary: Negative.    Musculoskeletal: Negative.    Skin: Negative.    Neurological: Positive for weakness.   Endo/Heme/Allergies: Negative.    Psychiatric/Behavioral: Negative.         Physical Exam  Temp:  [36.8 °C (98.3 °F)] 36.8 °C (98.3 °F)  Pulse:  [] 54  Resp:  [10-21] 20  BP: ()/(53-68) 115/68  SpO2:  [83 %-100 %] 97 %    Physical Exam  Constitutional:       General: He is not in acute distress.     Appearance: Normal appearance. He is ill-appearing.      Comments: Week   HENT:      Head:  Normocephalic and atraumatic.      Nose: Nose normal. No congestion.      Mouth/Throat:      Mouth: Mucous membranes are moist.   Eyes:      Extraocular Movements: Extraocular movements intact.      Pupils: Pupils are equal, round, and reactive to light.   Cardiovascular:      Rate and Rhythm: Normal rate and regular rhythm.      Pulses: Normal pulses.      Heart sounds: Normal heart sounds.   Pulmonary:      Effort: Pulmonary effort is normal.      Breath sounds: Rales present.   Abdominal:      General: Bowel sounds are normal. There is no distension.      Palpations: Abdomen is soft.      Tenderness: There is no abdominal tenderness. There is no guarding or rebound.   Musculoskeletal:         General: No swelling. Normal range of motion.      Cervical back: Normal range of motion and neck supple.      Right lower leg: No edema.      Left lower leg: No edema.   Skin:     General: Skin is warm.      Coloration: Skin is not jaundiced.   Neurological:      General: No focal deficit present.      Mental Status: He is alert and oriented to person, place, and time. Mental status is at baseline.      Cranial Nerves: No cranial nerve deficit.   Psychiatric:         Mood and Affect: Mood normal.         Behavior: Behavior normal.         Thought Content: Thought content normal.         Judgment: Judgment normal.         Fluids  No intake or output data in the 24 hours ending 08/18/21 0906    Laboratory  Recent Labs     08/17/21 2018   WBC 6.8   RBC 4.71   HEMOGLOBIN 13.0*   HEMATOCRIT 41.7*   MCV 88.5   MCH 27.6   MCHC 31.2*   RDW 48.4   PLATELETCT 216   MPV 9.0     Recent Labs     08/17/21 2018 08/18/21  0435   SODIUM 137 138   POTASSIUM 3.8 4.0   CHLORIDE 100 104   CO2 23 25   GLUCOSE 126* 162*   BUN 29* 24*   CREATININE 1.08 0.91   CALCIUM 9.6 8.5                   Imaging  DX-CHEST-PORTABLE (1 VIEW)   Final Result         Linear opacities in the lung bases could be atelectasis or infection.            Assessment/Plan  * Pneumonia due to COVID-19 virus- (present on admission)  Assessment & Plan  Complicated by acute hypoxic respiratory failure  Requiring 2 L nasal cannula  Positive nasopharyngeal swab  Admit to medical floor  Diabetic diet  Fluid restriction  Judicious IV hydration as needed  Continue dexamethasone  Hold off on remdesivir for now, on 2 L nasal cannula  Pending CRP and procalcitonin  Labs on a.m.    Acute respiratory failure with hypoxia (HCC)- (present on admission)  Assessment & Plan  Secondary to Covid pneumonia  Requiring 2 L with desaturation  RT protocol  Cont on dexamethasone   Will check a D-dimer   Covid pneumonia  Frequent incentive spirometer and pronation  Up to chair for all meals  Titrate oxygen as tolerable  If worsen consider ID consult for Remdesivir or tocilizumab     HLD (hyperlipidemia)- (present on admission)  Assessment & Plan  Continue home statin    SDH (subdural hematoma) (HCC)- (present on admission)  Assessment & Plan  Noted on 8/1/2021 head CT  Denies falls or worsening headaches  Continue to monitor    Parkinson disease (HCC)- (present on admission)  Assessment & Plan  Continue home carbidopa-levodopa    Type 2 diabetes mellitus, without long-term current use of insulin (HCC)- (present on admission)  Assessment & Plan  Last A1c 2 weeks ago at 6.7  Hold metformin for now cont on SSI   Diabetic diet  Frequent Accu-Cheks           VTE prophylaxis: SCDs/TEDs    I have performed a physical exam and reviewed and updated ROS and Plan today (8/18/2021). In review of yesterday's note (8/17/2021), there are no changes except as documented above.

## 2021-08-18 NOTE — PROGRESS NOTES
Received report from Abena in ED at 1500, patient already on the unit.   Patient is alert and oriented, PIV to right arm flushes/saline locked, 2 RN skin assessment completed, assessment completed. Patient is continent, urinal at bedside, fall risk r/t Parkinson's, appropriate signs on patient and door.

## 2021-08-18 NOTE — RESPIRATORY CARE
REMOTE MONITORING PROGRAM by RESPIRATORY THERAPY  8/18/2021 at 8:26 AM by Zeynep Delgado, RRT     Patient chart reviewed for COVID remote monitoring eligibility. Patient does not qualify for RPM due to insurance

## 2021-08-18 NOTE — ED PROVIDER NOTES
ED Provider Note    Scribed for Walt Jewell M.D. by Paxton Gutierrez. 8/17/2021, 7:43 PM.    Primary care provider: Pcp Pt States None  Means of arrival: Walk-in  History obtained from: Patient  History limited by: None    CHIEF COMPLAINT  Chief Complaint   Patient presents with   • Cough     productive with yellow mucos       HPI  Dinah Mathur is a 65 y.o. male who presents to the Emergency Department for evaluation of a cough onset 1 day. He has associated yellow sputum production, fever, and body aches. He denies any chest pain, nausea, vomiting, or sore throat. Has his COVID-19 vaccine. Has history of Parkinson's disease. He has no history of heart attacks.    REVIEW OF SYSTEMS  Pertinent positives include cough, yellow sputum production, fever, and body aches. Pertinent negatives include no chest pain, nausea, vomiting, sore throat. All other systems negative.    PAST MEDICAL HISTORY   has a past medical history of Diabetes (HCC), Parkinson's disease (HCC) (04/25/2021), and Parkinson's disease (HCC).    SURGICAL HISTORY  patient denies any surgical history    SOCIAL HISTORY  Social History     Tobacco Use   • Smoking status: Never Smoker   • Smokeless tobacco: Never Used   Vaping Use   • Vaping Use: Never assessed   Substance Use Topics   • Alcohol use: Never   • Drug use: Never      Social History     Substance and Sexual Activity   Drug Use Never       FAMILY HISTORY  History reviewed. No pertinent family history.    CURRENT MEDICATIONS  Home Medications     Reviewed by Lissy Caceres R.N. (Registered Nurse) on 08/17/21 at 1353  Med List Status: Partial   Medication Last Dose Status   amitriptyline (ELAVIL) 25 MG Tab  Active   carbidopa-levodopa (SINEMET)  MG Tab  Active   gabapentin (NEURONTIN) 600 MG tablet  Active   metFORMIN (GLUCOPHAGE) 500 MG Tab  Active   rosuvastatin (CRESTOR) 40 MG tablet  Active   tamsulosin (FLOMAX) 0.4 MG capsule  Active                ALLERGIES  No Known  "Allergies    PHYSICAL EXAM  VITAL SIGNS: BP (!) 95/60   Pulse 91   Temp 36.8 °C (98.3 °F) (Temporal)   Resp 14   Ht 1.753 m (5' 9\")   Wt 79.4 kg (175 lb)   SpO2 100%   BMI 25.84 kg/m²     Constitutional: Well developed, Well nourished, mild distress.  HENT: Normocephalic, Atraumatic, mask in place.  Eyes: Conjunctiva normal, No discharge.   Neck: Supple, No stridor.  Cardiovascular: Normal heart rate, Normal rhythm, No murmurs, equal pulses.   Pulmonary: Slight rales in left base, No respiratory distress, No wheezing, No rhonchi.  Chest: No chest wall tenderness or deformity.   Abdomen:Soft, No tenderness, No masses, no rebound, no guarding.   Back: No CVA tenderness.   Musculoskeletal: No major deformities noted, No tenderness.   Skin: Warm, Dry, No erythema, No rash.   Neurologic: Alert & oriented x2, Normal motor function,  No focal deficits noted.       LABS  Results for orders placed or performed during the hospital encounter of 08/17/21   CBC WITH DIFFERENTIAL   Result Value Ref Range    WBC 6.8 4.8 - 10.8 K/uL    RBC 4.71 4.70 - 6.10 M/uL    Hemoglobin 13.0 (L) 14.0 - 18.0 g/dL    Hematocrit 41.7 (L) 42.0 - 52.0 %    MCV 88.5 81.4 - 97.8 fL    MCH 27.6 27.0 - 33.0 pg    MCHC 31.2 (L) 33.7 - 35.3 g/dL    RDW 48.4 35.9 - 50.0 fL    Platelet Count 216 164 - 446 K/uL    MPV 9.0 9.0 - 12.9 fL    Neutrophils-Polys 49.10 44.00 - 72.00 %    Lymphocytes 34.50 22.00 - 41.00 %    Monocytes 13.50 (H) 0.00 - 13.40 %    Eosinophils 1.60 0.00 - 6.90 %    Basophils 1.00 0.00 - 1.80 %    Immature Granulocytes 0.30 0.00 - 0.90 %    Nucleated RBC 0.00 /100 WBC    Neutrophils (Absolute) 3.35 1.82 - 7.42 K/uL    Lymphs (Absolute) 2.35 1.00 - 4.80 K/uL    Monos (Absolute) 0.92 (H) 0.00 - 0.85 K/uL    Eos (Absolute) 0.11 0.00 - 0.51 K/uL    Baso (Absolute) 0.07 0.00 - 0.12 K/uL    Immature Granulocytes (abs) 0.02 0.00 - 0.11 K/uL    NRBC (Absolute) 0.00 K/uL   COMP METABOLIC PANEL   Result Value Ref Range    Sodium 137 135 - " 145 mmol/L    Potassium 3.8 3.6 - 5.5 mmol/L    Chloride 100 96 - 112 mmol/L    Co2 23 20 - 33 mmol/L    Anion Gap 14.0 7.0 - 16.0    Glucose 126 (H) 65 - 99 mg/dL    Bun 29 (H) 8 - 22 mg/dL    Creatinine 1.08 0.50 - 1.40 mg/dL    Calcium 9.6 8.5 - 10.5 mg/dL    AST(SGOT) 23 12 - 45 U/L    ALT(SGPT) 9 2 - 50 U/L    Alkaline Phosphatase 96 30 - 99 U/L    Total Bilirubin 0.3 0.1 - 1.5 mg/dL    Albumin 4.3 3.2 - 4.9 g/dL    Total Protein 7.8 6.0 - 8.2 g/dL    Globulin 3.5 1.9 - 3.5 g/dL    A-G Ratio 1.2 g/dL   LACTIC ACID   Result Value Ref Range    Lactic Acid 1.7 0.5 - 2.0 mmol/L   URINALYSIS    Specimen: Urine   Result Value Ref Range    Micro Urine Req Microscopic    COV-2, FLU A/B, AND RSV BY PCR (2-4 HOURS [facility]SoHE[facility]): Collect NP swab in VTM    Specimen: Respirate   Result Value Ref Range    Influenza virus A RNA Negative Negative    Influenza virus B, PCR Negative Negative    RSV, PCR Negative Negative    SARS-CoV-2 by PCR DETECTED (AA)     SARS-CoV-2 Source NP Swab    ESTIMATED GFR   Result Value Ref Range    GFR If African American >60 >60 mL/min/1.73 m 2    GFR If Non African American >60 >60 mL/min/1.73 m 2     All labs reviewed by me.      RADIOLOGY  DX-CHEST-PORTABLE (1 VIEW)   Final Result         Linear opacities in the lung bases could be atelectasis or infection.        The radiologist's interpretation of all radiological studies have been reviewed by me.    COURSE & MEDICAL DECISION MAKING  Pertinent Labs & Imaging studies reviewed. (See chart for details)    7:43 PM - Patient seen and examined at bedside. Patient will be treated with Bolus.  Ordered DX-Chest, Lactic acid, Blood culture, COV-2, Flu A/B and RSV, CBC with diff., CMP, Lactic acid, and urinalysisto evaluate his symptoms. The differential diagnoses include but are not limited to: Pneumonia, COVID-19, Congestive heart failure.    Patient has episodes of his hypoxia into the mid 80s.  Started on 2 L oxygen.    Patient is Covid positive.   Because of the episodes hypoxia is given dexamethasone.  His blood pressure is improved with IV fluids.  Paged the hospitalist at 11 PM.    Discussed case with Dr. Jaquez hospitalist he is agreed to consult on hospitalization      Medical Decision Making: Patient presents to emergency department with 1 day of cough with some mild hypotension.  Patient's blood pressure improved with IV fluids.  Patient is positive for COVID-19.  He does have some mild hypoxia in the mid 80s which responds to 2 L oxygen.  Because of hypoxia started on dexamethasone.  Patient's lactic acid was normal I do not believe he has sepsis at this point time.  Patient will be admitted to the hospital for oxygen support    DISPOSITION:  Patient will be hospitalized by Dr. Jaquez in guarded condition.       FINAL IMPRESSION  1. Pneumonia due to COVID-19 virus    2. Hypotension, unspecified hypotension type    3. Hypoxia          Paxton CESAR (Blakeibfreddy), am scribing for, and in the presence of, Walt Jewell M.D.    Electronically signed by: Paxton Gutierrez (Blakeibfreddy), 8/17/2021    IWalt M.D. personally performed the services described in this documentation, as scribed by Paxton Gutierrez in my presence, and it is both accurate and complete.    The note accurately reflects work and decisions made by me.  Walt Jewell M.D.  8/17/2021  11:09 PM     C

## 2021-08-18 NOTE — PROGRESS NOTES
Attending Hospitalist today is Dr. Dumont.  Please call this physician for orders, updates, or questions.

## 2021-08-18 NOTE — PROGRESS NOTES
4 Eyes Skin Assessment Completed by DASIA Davalos and DASIA Smith.    Head WDL  Ears WDL  Nose WDL  Mouth WDL  Neck WDL  Breast/Chest WDL  Shoulder Blades WDL  Spine WDL  (R) Arm/Elbow/Hand WDL  (L) Arm/Elbow/Hand WDL  Abdomen WDL  Groin WDL  Scrotum/Coccyx/Buttocks Redness, Non-Blanching, Moisture Fissure, Discoloration and Scar  (R) Leg Scab  (L) Leg Scab  (R) Heel/Foot/Toe WDL- Dry  (L) Heel/Foot/Toe WDL -Dry        Devices In Places Pulse Ox      Interventions In Place Gray Ear Foams and Pillows    Possible Skin Injury Yes    Pictures Uploaded Into Epic Yes  Wound Consult Placed Yes  RN Wound Prevention Protocol Ordered Yes    Skin is warm, dry, intact. Bilateral feet are dry but intact. Bony prominences are intact, without redness or breakdown.Pressure injury to left buttock, wound consult placed, photo uploaded.

## 2021-08-18 NOTE — H&P
Hospital Medicine History & Physical Note    Date of Service  8/17/2021    Primary Care Physician  Pcp Pt States None    Consultants  None    Code Status  Full Code    Chief Complaint  Chief Complaint   Patient presents with   • Cough     productive with yellow mucos       History of Presenting Illness  65-year-old male with a past medical history of type 2 diabetes, Parkinson's, dementia baseline, and recent hospital discharge on 8/7/2021 for subdural hematoma secondary to ground-level fall presented to emergency department with 1 day history of worsening cough which is productive of yellow sputum.  Patient reports that yesterday he started developing cough and has not improved.  He associates symptoms with worsening general weakness.  He denies dyspnea or shortness of breath.  No fever, chills or night sweats.    At the emergency department, vital signs with tachycardia in the 100 and hypotension in the 90s/60s which resolved with IV hydration. Patient requiring 2 L nasal cannula to maintain saturations.  CBC with mild normocytic anemia, no leukocytosis.  Chemistries without grossly abnormalities.  Lactic acid 1.7.  Covid nasopharyngeal swab positive.  Chest x-ray with opacities at the lung bases.  Blood samples were collected for culture.  Patient received sepsis bolus at ED and a dose of dexamethasone.  Patient was admitted for acute hypoxic respiratory failure secondary to Covid pneumonia.    I discussed the plan of care with patient.    Review of Systems  Review of Systems   Constitutional: Positive for malaise/fatigue.   HENT: Negative.    Eyes: Negative.    Respiratory: Positive for cough and sputum production. Negative for shortness of breath.    Cardiovascular: Negative.    Gastrointestinal: Negative.    Genitourinary: Negative.    Musculoskeletal: Negative.    Skin: Negative.    Neurological: Positive for weakness.   Endo/Heme/Allergies: Negative.    Psychiatric/Behavioral: Negative.        Past Medical  History   has a past medical history of Diabetes (HCC), Parkinson's disease (HCC) (04/25/2021), and Parkinson's disease (HCC).    Surgical History  Reviewed and nonpertinent    Family History  family history is not on file.   Family history reviewed with patient. There is no family history that is pertinent to the chief complaint.     Social History   reports that he has never smoked. He has never used smokeless tobacco. He reports that he does not drink alcohol and does not use drugs.    Allergies  No Known Allergies    Medications  Prior to Admission Medications   Prescriptions Last Dose Informant Patient Reported? Taking?   amitriptyline (ELAVIL) 25 MG Tab   No No   Sig: Take 1 tablet by mouth every day at bedtime   carbidopa-levodopa (SINEMET)  MG Tab   No No   Sig: Take 1 tablet by mouth 4 times a day   gabapentin (NEURONTIN) 600 MG tablet   No No   Sig: Take 1 tablet by mouth 3 times daily   Patient taking differently: Take 600 mg by mouth 3 times a day.   metFORMIN (GLUCOPHAGE) 500 MG Tab   No No   Sig: Take 1 tablet by mouth twice daily   Patient taking differently: Take 500 mg by mouth 2 times a day.   rosuvastatin (CRESTOR) 40 MG tablet   No No   Sig: Take 1 tablet by mouth once daily   Patient taking differently: Take 40 mg by mouth every day.   tamsulosin (FLOMAX) 0.4 MG capsule   No No   Sig: Take 1 capsule by mouth once daily   Patient taking differently: Take 0.4 mg by mouth every day.      Facility-Administered Medications: None       Physical Exam  Temp:  [36.8 °C (98.3 °F)] 36.8 °C (98.3 °F)  Pulse:  [] 55  Resp:  [10-18] 10  BP: (95-99)/(60-62) 95/60  SpO2:  [83 %-100 %] 86 %    Physical Exam  Constitutional:       General: He is not in acute distress.     Appearance: Normal appearance. He is ill-appearing.      Comments: Week   HENT:      Head: Normocephalic and atraumatic.      Nose: Nose normal. No congestion.      Mouth/Throat:      Mouth: Mucous membranes are moist.   Eyes:       Extraocular Movements: Extraocular movements intact.      Pupils: Pupils are equal, round, and reactive to light.   Cardiovascular:      Rate and Rhythm: Normal rate and regular rhythm.      Pulses: Normal pulses.      Heart sounds: Normal heart sounds.   Pulmonary:      Effort: Pulmonary effort is normal.      Breath sounds: Rales present.   Abdominal:      General: Bowel sounds are normal. There is no distension.      Palpations: Abdomen is soft.      Tenderness: There is no abdominal tenderness. There is no guarding or rebound.   Musculoskeletal:         General: No swelling. Normal range of motion.      Cervical back: Normal range of motion and neck supple.      Right lower leg: No edema.      Left lower leg: No edema.   Skin:     General: Skin is warm.      Coloration: Skin is not jaundiced.   Neurological:      General: No focal deficit present.      Mental Status: He is alert and oriented to person, place, and time. Mental status is at baseline.      Cranial Nerves: No cranial nerve deficit.   Psychiatric:         Mood and Affect: Mood normal.         Behavior: Behavior normal.         Thought Content: Thought content normal.         Judgment: Judgment normal.         Laboratory:  Recent Labs     08/17/21 2018   WBC 6.8   RBC 4.71   HEMOGLOBIN 13.0*   HEMATOCRIT 41.7*   MCV 88.5   MCH 27.6   MCHC 31.2*   RDW 48.4   PLATELETCT 216   MPV 9.0     Recent Labs     08/17/21  2018   SODIUM 137   POTASSIUM 3.8   CHLORIDE 100   CO2 23   GLUCOSE 126*   BUN 29*   CREATININE 1.08   CALCIUM 9.6     Recent Labs     08/17/21  2018   ALTSGPT 9   ASTSGOT 23   ALKPHOSPHAT 96   TBILIRUBIN 0.3   GLUCOSE 126*         Recent Labs     08/17/21  2018   NTPROBNP 17         No results for input(s): TROPONINT in the last 72 hours.    Imaging:  DX-CHEST-PORTABLE (1 VIEW)   Final Result         Linear opacities in the lung bases could be atelectasis or infection.          Assessment/Plan:  I anticipate this patient will require at least  two midnights for appropriate medical management, necessitating inpatient admission.    * Pneumonia due to COVID-19 virus- (present on admission)  Assessment & Plan  Complicated by acute hypoxic respiratory failure  Requiring 2 L nasal cannula  Positive nasopharyngeal swab  Admit to medical floor  Diabetic diet  Fluid restriction  Judicious IV hydration as needed  Continue dexamethasone  Hold off on remdesivir for now, on 2 L nasal cannula  Pending CRP and procalcitonin  Labs on a.m.    Acute respiratory failure with hypoxia (HCC)- (present on admission)  Assessment & Plan  Secondary to Covid pneumonia  Requiring 2 L with desaturation  RT/O2  Covid pneumonia  Frequent incentive spirometer and pronation  Up to chair for all meals  Titrate oxygen as tolerable    HLD (hyperlipidemia)- (present on admission)  Assessment & Plan  Continue home statin    SDH (subdural hematoma) (HCC)- (present on admission)  Assessment & Plan  Noted on 8/1/2021 head CT  Denies falls or worsening headaches  Continue to monitor    Parkinson disease (HCC)- (present on admission)  Assessment & Plan  Continue home carbidopa-levodopa    Type 2 diabetes mellitus, without long-term current use of insulin (HCC)- (present on admission)  Assessment & Plan  Last A1c 2 weeks ago at 6.7  Hold metformin for now  Diabetic diet  Insulin sliding scale  Frequent Accu-Cheks  Labs in a.m.      VTE prophylaxis: SCDs/TEDs

## 2021-08-18 NOTE — CARE PLAN
The patient is Stable - Low risk of patient condition declining or worsening    Shift Goals  Clinical Goals: Maintain oxygen saturation, rest  Patient Goals: Rest  Family Goals: NA    Progress made toward(s) clinical / shift goals:  Oxygen saturation above 95% on 3L NC    Patient is not progressing towards the following goals:Bed alarm on due to history of Parkinson's      Problem: Fall Risk  Goal: Patient will remain free from falls  Outcome: Not Progressing

## 2021-08-18 NOTE — ASSESSMENT & PLAN NOTE
Complicated by acute hypoxic respiratory failure  Requiring 2 L nasal cannula  Positive nasopharyngeal swab  Admit to medical floor  Diabetic diet  Fluid restriction  Judicious IV hydration as needed  Continue dexamethasone  Hold off on remdesivir for now, on 2 L nasal cannula  Pending CRP and procalcitonin  Labs on a.m.

## 2021-08-18 NOTE — ED NOTES
Med rec updated and complete. Allergies reviewed.  No antibiotic use in last  30 days.          Home pharmacy [facility]      Current Outpatient Medications on File Prior to Encounter   Medication Sig Dispense Refill   • gabapentin (NEURONTIN) 600 MG tablet Take 600 mg by mouth 3 times a day.     • metFORMIN (GLUCOPHAGE) 500 MG Tab Take 500 mg by mouth 2 times a day.     • rosuvastatin (CRESTOR) 40 MG tablet Take 40 mg by mouth every day.     • tamsulosin (FLOMAX) 0.4 MG capsule Take 0.4 mg by mouth 1/2 hour after breakfast.     • amitriptyline (ELAVIL) 25 MG Tab Take 1 tablet by mouth every day at bedtime 90 tablet 3   • carbidopa-levodopa (SINEMET)  MG Tab Take 1 tablet by mouth 4 times a day 360 tablet 3

## 2021-08-18 NOTE — ED NOTES
Pt provided with urinal at this time. Repeat labs drawn.   Patient/Caregiver provided printed discharge information.

## 2021-08-19 VITALS
OXYGEN SATURATION: 99 % | HEART RATE: 60 BPM | SYSTOLIC BLOOD PRESSURE: 115 MMHG | DIASTOLIC BLOOD PRESSURE: 70 MMHG | BODY MASS INDEX: 25.92 KG/M2 | HEIGHT: 69 IN | WEIGHT: 175 LBS | RESPIRATION RATE: 16 BRPM | TEMPERATURE: 97.8 F

## 2021-08-19 PROBLEM — J96.01 ACUTE RESPIRATORY FAILURE WITH HYPOXIA (HCC): Status: RESOLVED | Noted: 2021-08-17 | Resolved: 2021-08-19

## 2021-08-19 PROBLEM — S06.5XAA SDH (SUBDURAL HEMATOMA) (HCC): Status: RESOLVED | Noted: 2021-08-01 | Resolved: 2021-08-19

## 2021-08-19 LAB
GLUCOSE BLD-MCNC: 125 MG/DL (ref 65–99)
GLUCOSE BLD-MCNC: 158 MG/DL (ref 65–99)

## 2021-08-19 PROCEDURE — 97161 PT EVAL LOW COMPLEX 20 MIN: CPT

## 2021-08-19 PROCEDURE — 99239 HOSP IP/OBS DSCHRG MGMT >30: CPT | Performed by: INTERNAL MEDICINE

## 2021-08-19 PROCEDURE — 82962 GLUCOSE BLOOD TEST: CPT | Mod: 91

## 2021-08-19 PROCEDURE — 700102 HCHG RX REV CODE 250 W/ 637 OVERRIDE(OP): Performed by: STUDENT IN AN ORGANIZED HEALTH CARE EDUCATION/TRAINING PROGRAM

## 2021-08-19 PROCEDURE — A9270 NON-COVERED ITEM OR SERVICE: HCPCS | Performed by: STUDENT IN AN ORGANIZED HEALTH CARE EDUCATION/TRAINING PROGRAM

## 2021-08-19 RX ADMIN — DEXAMETHASONE 6 MG: 4 TABLET ORAL at 05:48

## 2021-08-19 RX ADMIN — CARBIDOPA AND LEVODOPA 1 TABLET: 25; 250 TABLET ORAL at 05:48

## 2021-08-19 RX ADMIN — ROSUVASTATIN CALCIUM 40 MG: 20 TABLET, FILM COATED ORAL at 05:48

## 2021-08-19 RX ADMIN — TAMSULOSIN HYDROCHLORIDE 0.4 MG: 0.4 CAPSULE ORAL at 05:47

## 2021-08-19 RX ADMIN — DOCUSATE SODIUM 50 MG AND SENNOSIDES 8.6 MG 2 TABLET: 8.6; 5 TABLET, FILM COATED ORAL at 05:47

## 2021-08-19 RX ADMIN — CARBIDOPA AND LEVODOPA 1 TABLET: 25; 250 TABLET ORAL at 00:28

## 2021-08-19 ASSESSMENT — PAIN DESCRIPTION - PAIN TYPE
TYPE: ACUTE PAIN
TYPE: ACUTE PAIN

## 2021-08-19 ASSESSMENT — COGNITIVE AND FUNCTIONAL STATUS - GENERAL
MOVING TO AND FROM BED TO CHAIR: A LITTLE
CLIMB 3 TO 5 STEPS WITH RAILING: A LITTLE
SUGGESTED CMS G CODE MODIFIER MOBILITY: CJ
MOBILITY SCORE: 22

## 2021-08-19 ASSESSMENT — GAIT ASSESSMENTS
ASSISTIVE DEVICE: FRONT WHEEL WALKER
DISTANCE (FEET): 100
DEVIATION: BRADYKINETIC
GAIT LEVEL OF ASSIST: SUPERVISED

## 2021-08-19 NOTE — PROGRESS NOTES
Received report form day shift RN. Assumed pt care. Assessment completed. Pt A&O x4. Denies pain at this time. No s/s of discomfort/distress noted. Urinal at bedside. Bed in lowest position, bed locked, bed alarm on, call light within reach.

## 2021-08-19 NOTE — DISCHARGE INSTRUCTIONS
Discharge Instructions    Discharged to home by car with relative. Discharged via wheelchair, hospital escort: Yes.  Special equipment needed: Not Applicable    Be sure to schedule a follow-up appointment with your primary care doctor or any specialists as instructed.     Discharge Plan:   Diet Plan: Discussed  Activity Level: Discussed  Confirmed Follow up Appointment: Patient to Call and Schedule Appointment  Confirmed Symptoms Management: Discussed  Medication Reconciliation Updated: Yes    I understand that a diet low in cholesterol, fat, and sodium is recommended for good health. Unless I have been given specific instructions below for another diet, I accept this instruction as my diet prescription.   Other diet: Diabetic    Special Instructions: None    · Is patient discharged on Warfarin / Coumadin?   No     Depression / Suicide Risk    As you are discharged from this [facility] facility, it is important to learn how to keep safe from harming yourself.    Recognize the warning signs:  · Abrupt changes in personality, positive or negative- including increase in energy   · Giving away possessions  · Change in eating patterns- significant weight changes-  positive or negative  · Change in sleeping patterns- unable to sleep or sleeping all the time   · Unwillingness or inability to communicate  · Depression  · Unusual sadness, discouragement and loneliness  · Talk of wanting to die  · Neglect of personal appearance   · Rebelliousness- reckless behavior  · Withdrawal from people/activities they love  · Confusion- inability to concentrate     If you or a loved one observes any of these behaviors or has concerns about self-harm, here's what you can do:  · Talk about it- your feelings and reasons for harming yourself  · Remove any means that you might use to hurt yourself (examples: pills, rope, extension cords, firearm)  · Get professional help from the community (Mental Health, Substance Abuse, psychological  counseling)  · Do not be alone:Call your Safe Contact- someone whom you trust who will be there for you.  · Call your local CRISIS HOTLINE 785-7214 or 821-896-4187  · Call your local Children's Mobile Crisis Response Team [place] (169) 867-7229 or www.[facility]  · Call the toll free National Suicide Prevention Hotlines   · National Suicide Prevention Lifeline 120-203-QXXP (9327)  · National Hope Line Network 800-SUICIDE (417-2895)      COVID-19  COVID-19 is a respiratory infection that is caused by a virus called severe acute respiratory syndrome coronavirus 2 (SARS-CoV-2). The disease is also known as coronavirus disease or novel coronavirus. In some people, the virus may not cause any symptoms. In others, it may cause a serious infection. The infection can get worse quickly and can lead to complications, such as:  · Pneumonia, or infection of the lungs.  · Acute respiratory distress syndrome or ARDS. This is fluid build-up in the lungs.  · Acute respiratory failure. This is a condition in which there is not enough oxygen passing from the lungs to the body.  · Sepsis or septic shock. This is a serious bodily reaction to an infection.  · Blood clotting problems.  · Secondary infections due to bacteria or fungus.  The virus that causes COVID-19 is contagious. This means that it can spread from person to person through droplets from coughs and sneezes (respiratory secretions).  What are the causes?  This illness is caused by a virus. You may catch the virus by:  · Breathing in droplets from an infected person's cough or sneeze.  · Touching something, like a table or a doorknob, that was exposed to the virus (contaminated) and then touching your mouth, nose, or eyes.  What increases the risk?  Risk for infection  You are more likely to be infected with this virus if you:  · Live in or travel to an area with a COVID-19 outbreak.  · Come in contact with a sick person who recently traveled to an area with a  COVID-19 outbreak.  · Provide care for or live with a person who is infected with COVID-19.  Risk for serious illness  You are more likely to become seriously ill from the virus if you:  · Are 65 years of age or older.  · Have a long-term disease that lowers your body's ability to fight infection (immunocompromised).  · Live in a nursing home or long-term care facility.  · Have a long-term (chronic) disease such as:  ? Chronic lung disease, including chronic obstructive pulmonary disease or asthma  ? Heart disease.  ? Diabetes.  ? Chronic kidney disease.  ? Liver disease.  · Are obese.  What are the signs or symptoms?  Symptoms of this condition can range from mild to severe. Symptoms may appear any time from 2 to 14 days after being exposed to the virus. They include:  · A fever.  · A cough.  · Difficulty breathing.  · Chills.  · Muscle pains.  · A sore throat.  · Loss of taste or smell.  Some people may also have stomach problems, such as nausea, vomiting, or diarrhea.  Other people may not have any symptoms of COVID-19.  How is this diagnosed?  This condition may be diagnosed based on:  · Your signs and symptoms, especially if:  ? You live in an area with a COVID-19 outbreak.  ? You recently traveled to or from an area where the virus is common.  ? You provide care for or live with a person who was diagnosed with COVID-19.  · A physical exam.  · Lab tests, which may include:  ? A nasal swab to take a sample of fluid from your nose.  ? A throat swab to take a sample of fluid from your throat.  ? A sample of mucus from your lungs (sputum).  ? Blood tests.  · Imaging tests, which may include, X-rays, CT scan, or ultrasound.  How is this treated?  At present, there is no medicine to treat COVID-19. Medicines that treat other diseases are being used on a trial basis to see if they are effective against COVID-19.  Your health care provider will talk with you about ways to treat your symptoms. For most people, the  infection is mild and can be managed at home with rest, fluids, and over-the-counter medicines.  Treatment for a serious infection usually takes places in a hospital intensive care unit (ICU). It may include one or more of the following treatments. These treatments are given until your symptoms improve.  · Receiving fluids and medicines through an IV.  · Supplemental oxygen. Extra oxygen is given through a tube in the nose, a face mask, or a hoffmann.  · Positioning you to lie on your stomach (prone position). This makes it easier for oxygen to get into the lungs.  · Continuous positive airway pressure (CPAP) or bi-level positive airway pressure (BPAP) machine. This treatment uses mild air pressure to keep the airways open. A tube that is connected to a motor delivers oxygen to the body.  · Ventilator. This treatment moves air into and out of the lungs by using a tube that is placed in your windpipe.  · Tracheostomy. This is a procedure to create a hole in the neck so that a breathing tube can be inserted.  · Extracorporeal membrane oxygenation (ECMO). This procedure gives the lungs a chance to recover by taking over the functions of the heart and lungs. It supplies oxygen to the body and removes carbon dioxide.  Follow these instructions at home:  Lifestyle  · If you are sick, stay home except to get medical care. Your health care provider will tell you how long to stay home. Call your health care provider before you go for medical care.  · Rest at home as told by your health care provider.  · Do not use any products that contain nicotine or tobacco, such as cigarettes, e-cigarettes, and chewing tobacco. If you need help quitting, ask your health care provider.  · Return to your normal activities as told by your health care provider. Ask your health care provider what activities are safe for you.  General instructions  · Take over-the-counter and prescription medicines only as told by your health care  provider.  · Drink enough fluid to keep your urine pale yellow.  · Keep all follow-up visits as told by your health care provider. This is important.  How is this prevented?    There is no vaccine to help prevent COVID-19 infection. However, there are steps you can take to protect yourself and others from this virus.  To protect yourself:   · Do not travel to areas where COVID-19 is a risk. The areas where COVID-19 is reported change often. To identify high-risk areas and travel restrictions, check the CDC travel website: wwwnc.cdc.gov/travel/notices  · If you live in, or must travel to, an area where COVID-19 is a risk, take precautions to avoid infection.  ? Stay away from people who are sick.  ? Wash your hands often with soap and water for 20 seconds. If soap and water are not available, use an alcohol-based hand .  ? Avoid touching your mouth, face, eyes, or nose.  ? Avoid going out in public, follow guidance from your state and local health authorities.  ? If you must go out in public, wear a cloth face covering or face mask.  ? Disinfect objects and surfaces that are frequently touched every day. This may include:  § Counters and tables.  § Doorknobs and light switches.  § Sinks and faucets.  § Electronics, such as phones, remote controls, keyboards, computers, and tablets.  To protect others:  If you have symptoms of COVID-19, take steps to prevent the virus from spreading to others.  · If you think you have a COVID-19 infection, contact your health care provider right away. Tell your health care team that you think you may have a COVID-19 infection.  · Stay home. Leave your house only to seek medical care. Do not use public transport.  · Do not travel while you are sick.  · Wash your hands often with soap and water for 20 seconds. If soap and water are not available, use alcohol-based hand .  · Stay away from other members of your household. Let healthy household members care for children  and pets, if possible. If you have to care for children or pets, wash your hands often and wear a mask. If possible, stay in your own room, separate from others. Use a different bathroom.  · Make sure that all people in your household wash their hands well and often.  · Cough or sneeze into a tissue or your sleeve or elbow. Do not cough or sneeze into your hand or into the air.  · Wear a cloth face covering or face mask.  Where to find more information  · Centers for Disease Control and Prevention: www.cdc.gov/coronavirus/2019-ncov/index.html  · World Health Organization: www.who.int/health-topics/coronavirus  Contact a health care provider if:  · You live in or have traveled to an area where COVID-19 is a risk and you have symptoms of the infection.  · You have had contact with someone who has COVID-19 and you have symptoms of the infection.  Get help right away if:  · You have trouble breathing.  · You have pain or pressure in your chest.  · You have confusion.  · You have bluish lips and fingernails.  · You have difficulty waking from sleep.  · You have symptoms that get worse.  These symptoms may represent a serious problem that is an emergency. Do not wait to see if the symptoms will go away. Get medical help right away. Call your local emergency services (911 in the [place] Do not drive yourself to the hospital. Let the emergency medical personnel know if you think you have COVID-19.  Summary  · COVID-19 is a respiratory infection that is caused by a virus. It is also known as coronavirus disease or novel coronavirus. It can cause serious infections, such as pneumonia, acute respiratory distress syndrome, acute respiratory failure, or sepsis.  · The virus that causes COVID-19 is contagious. This means that it can spread from person to person through droplets from coughs and sneezes.  · You are more likely to develop a serious illness if you are 65 years of age or older, have a weak immunity, live in a nursing  home, or have chronic disease.  · There is no medicine to treat COVID-19. Your health care provider will talk with you about ways to treat your symptoms.  · Take steps to protect yourself and others from infection. Wash your hands often and disinfect objects and surfaces that are frequently touched every day. Stay away from people who are sick and wear a mask if you are sick.  This information is not intended to replace advice given to you by your health care provider. Make sure you discuss any questions you have with your health care provider.  Document Released: 01/23/2020 Document Revised: 05/14/2020 Document Reviewed: 01/23/2020  [employer] Patient Education © 2020 [employer]    Infection Prevention in the Home  If you have an infection, may have been exposed to an infection, or are taking care of someone who has an infection, it is important to know how to keep the infection from spreading. Follow your health care provider's instructions and use these guidelines to help stop the spread of infection.  How infections are spread  In order for an infection to spread, the following must be present:  · A germ. This may be a virus, bacteria, fungus, or parasite.  · A place for the germ to live. This may be:  ? On or in a person, animal, plant, or food.  ? In soil or water.  ? On surfaces, such as a door handle.  · A person or animal who can develop a disease if the germ enters the body (host). The host does not have resistance to the germ.  · A way for the germ to enter the host. This may occur by:  ? Direct contact with an infected person or animal. This can happen through shaking hands or hugging. Some germs can also travel through the air and spread to others. This can happen when an infected person coughs or sneezes on or near other people.  ? Indirect contact. This occurs when the germ enters the host through contact with an infected object. Examples include:  § Eating or drinking food or water that has the germ  (is contaminated).  § Touching a contaminated surface with your hands, and then touching your face, eyes, nose, or mouth.  Supplies needed:  · Soap.  · Alcohol-based hand .  · Standard cleaning products.  · Disinfectants, such as bleach.  · Reusable cleaning cloths, sponges, or paper towels.  · Disposable or reusable utility gloves.  How to prevent infection from spreading  There are several things that you can do to help prevent infection from spreading.  Take these general actions  Everyone should take the following actions to prevent the spread of infection:  · Wash your hands often with soap and water for at least 20 seconds. If soap and water are not available, use alcohol-based hand .  · Avoid touching your face, mouth, nose, or eyes.  · Cough or sneeze into a tissue, sleeve, or elbow instead of into your hand or into the air.  ? If you cough or sneeze into a tissue, throw it away immediately and wash your hands.    Keep your bathroom clean  · Provide soap.  · Change towels and washcloths frequently.  · Change toothbrushes often and store them separately in a clean, dry place.  · Clean and disinfect all surfaces, including the toilet, floor, tub, shower, and sink.  · Do not share personal items, such as razors, toothbrushes, deodorant, perez, brushes, towels, and washcloths.  Maintain hygiene in the kitchen    · Wash your hands before and after preparing food and before you eat.  · Clean the inside of your refrigerator each week.  · Keep your refrigerator set at 40°F (4°C) or less, and set your freezer at 0°F (-18°C) or less.  · Keep work surfaces clean. Disinfect them regularly.  · Wash your dishes in hot, soapy water. Air-dry your dishes or use a .  · Do not share dishes or eating utensils.  Handle food safely  · Store food carefully.  · Refrigerate leftovers promptly in covered containers.  · Throw out stale or spoiled food.  · Thaw foods in the refrigerator or microwave, not at  room temperature.  · Serve foods at the proper temperature. Do not eat raw meat. Make sure it is cooked to the appropriate temperature. Cook eggs until they are firm.  · Wash fruits and vegetables under running water.  · Use separate cutting boards, plates, and utensils for raw foods and cooked foods.  · Use a clean spoon each time you sample food while cooking.  Do laundry the right way  · Wear gloves if laundry is visibly soiled.  · Do not shake soiled laundry. Doing that may send germs into the air.  · Wash laundry in hot water.  · If you cannot wash the laundry right away, place it in a plastic bag and wash it as soon as possible.  Be careful around animals and pets  · Wash your hands before and after touching animals.  · If you have a pet, ensure that your pet stays clean. Do not let people with weak immune systems touch bird droppings, fish tank water, or a litter box.  ? If you have a pet cage or litter box, be sure to clean it every day.  · If you are sick, stay away from animals and have someone else care for them if possible.  How to clean and disinfect objects and surfaces  Precautions  · Some disinfectants work for certain germs and not others. Read the 's instructions or read online resources to determine if the product you are using will work for the germ you are trying to remove.  · If you choose to use bleach, use it safely. Never mix it with other cleaning products, especially those that contain ammonia. This mixture can create a dangerous gas that may be deadly.  · Keep proper movement of fresh air in your home (ventilation).  · Pour used mop water down the utility sink or toilet. Do not pour this water down the kitchen sink.  Objects and surfaces    · If surfaces are visibly soiled, clean them first with soap and water before disinfecting.  · Disinfect surfaces that are frequently touched every day. This may include:  ? Counters.  ? Tables.  ? Doorknobs.  ? Sinks and  faucets.  ? Electronics, such as:  § Phones.  § Remote controls.  § Keyboards.  § Computers and tablets.  Cleaning supplies  Some cleaning supplies can breed germs. Take good care of them to prevent germs from spreading. To do this:  · Soak toilet brushes, mops, and sponges in bleach and water for 5 minutes after each use, or according to 's instructions.  · Wash reusable cleaning cloths and sanitize sponges after each use.  · Throw away disposable gloves after one use.  · Replace reusable utility gloves if they are cracked or torn or if they start to peel.  Additional actions if you are sick  If you live with other people:    · Avoid close contact with those around you. Stay at least 3 ft (1 m) away from others, if possible.  · Use a separate bathroom, if possible.  · If possible, sleep in a separate bedroom or in a separate bed to prevent infecting other household members.  ? Change bedroom linens each week or whenever they are soiled.  · Have everyone in the household wash hands often with soap and water. If soap and water are not available, use alcohol-based hand .  In general:  · Stay home except to get medical care. Call ahead before visiting your health care provider.  · Ask others to get groceries and household supplies and to refill prescriptions for you.  · Avoid public areas. Try not to take public transportation.  · If you can, wear a mask if you need to go out of the house, or if you are in close contact with someone who is not sick.  · Avoid visitors until you have completely recovered, or until you have no signs and symptoms of infection.  · Avoid preparing food or providing care for others. If you must prepare food or provide care for others, wear a mask and wash your hands before and after doing these things.  Where to find more information  · Centers for Disease Control and Prevention: www.cdc.gov/nonpharmaceutical-interventions/index.html  · World Health Organization (WHO):  www.who.int/infection-prevention/about/en/  · Association for Professionals in Infection Control and Epidemiology: professionals.site.apic.org/settings-of-care/non-healthcare-setting/home/  Summary  · It is important to know how to keep the infection from spreading.  · Make sure everyone in your household washes their hands often with soap and water.  · Disinfect surfaces that are frequently touched every day.  · If you are sick, stay home except to get medical care.  This information is not intended to replace advice given to you by your health care provider. Make sure you discuss any questions you have with your health care provider.  Document Released: 09/26/2009 Document Revised: 04/14/2020 Document Reviewed: 03/13/2020  [employer] Patient Education © 2020 [employer]

## 2021-08-19 NOTE — CARE PLAN
Problem: Knowledge Deficit - Standard  Goal: Patient and family/care givers will demonstrate understanding of plan of care, disease process/condition, diagnostic tests and medications  Outcome: Progressing     Problem: Fall Risk  Goal: Patient will remain free from falls  Outcome: Progressing   The patient is Stable - Low risk of patient condition declining or worsening    Shift Goals  Clinical Goals: no oxygen requirements  Patient Goals: Discharge  Family Goals: NA    Progress made toward(s) clinical / shift goals: Pt's oxygen saturations >96% on RA with ambulation. Pt does not require oxygen. Pt waiting on a ride home.    Patient is not progressing towards the following goals:

## 2021-08-19 NOTE — THERAPY
Missed Therapy     Patient Name: Dinah Mathur  Age:  65 y.o., Sex:  male  Medical Record #: [id number]  Today's Date: 8/19/2021 08/19/21 0948   Interdisciplinary Plan of Care Collaboration   IDT Collaboration with  Nursing   Collaboration Comments OT consult received, per RN pt back to baseline and has no acute OT needs and discharging shortly. No OT eval indicated at this time and will cancel the order.

## 2021-08-19 NOTE — DISCHARGE SUMMARY
"Discharge Summary    CHIEF COMPLAINT ON ADMISSION  Chief Complaint   Patient presents with   • Cough     productive with yellow mucos       Reason for Admission  cough     Admission Date  8/17/2021    CODE STATUS  Full Code    HPI & HOSPITAL COURSE  Dinah Mathur is a 65 y.o. male admitted 8/17/2021 with shortness of breath and productive cough. He has type 2 diabetes, Parkinson's disease, dementia, recent hospitalized for subdural hematoma after a ground level fall (discharged on 8/7/2021).     On admission, COVID-19 swab was positive. Chest x-ray reported \"  Opacities in the lung bases could be atelectasis or infection\".  He was afebrile.     Currently saturating 96 to 99% on 3 LPM nasal cannula.  Afebrile, stable blood pressure and heart rate.  He had a few isolated readings of hypoxia, however mainly has been saturating well on room air.  Vital signs including heart rate, respiratory rate, blood pressure, have all been stable since past 24 hours.  Patient has no complaints.    Patient has been advised to continue his home medications for Parkinson's disease, dyslipidemia, and diabetes.  He has been advised to follow-up with his primary care physician within the next 1 to 2 days.      Therefore, he is discharged in fair and stable condition to home with close outpatient follow-up.    Discharge Date  8/19/2021    FOLLOW UP ITEMS POST DISCHARGE  PCP in 1-2 days    DISCHARGE DIAGNOSES  Principal Problem:    Pneumonia due to COVID-19 virus POA: Yes  Active Problems:    Type 2 diabetes mellitus, without long-term current use of insulin (HCC) POA: Yes    Parkinson disease (HCC) POA: Yes    HLD (hyperlipidemia) POA: Yes  Resolved Problems:    SDH (subdural hematoma) ([facility]) POA: Yes    Acute respiratory failure with hypoxia (HCC) POA: Yes      FOLLOW UP  No future appointments.  No follow-up provider specified.    MEDICATIONS ON DISCHARGE     Medication List      CONTINUE taking these medications      Instructions "   amitriptyline 25 MG Tabs  Commonly known as: ELAVIL   Take 1 tablet by mouth every day at bedtime  Dose: 25 mg     carbidopa-levodopa  MG Tabs  Commonly known as: SINEMET   Take 1 tablet by mouth 4 times a day     gabapentin 600 MG tablet  Commonly known as: NEURONTIN   Take 600 mg by mouth 3 times a day.  Dose: 600 mg     metFORMIN 500 MG Tabs  Commonly known as: GLUCOPHAGE   Take 500 mg by mouth 2 times a day.  Dose: 500 mg     rosuvastatin 40 MG tablet  Commonly known as: CRESTOR   Take 40 mg by mouth every day.  Dose: 40 mg     tamsulosin 0.4 MG capsule  Commonly known as: FLOMAX   Take 0.4 mg by mouth 1/2 hour after breakfast.  Dose: 0.4 mg            Allergies  No Known Allergies    DIET  Orders Placed This Encounter   Procedures   • Diet Order Diet: Consistent CHO (Diabetic); Fluid modifications: (optional): 1000 ml Fluid Restriction     Standing Status:   Standing     Number of Occurrences:   1     Order Specific Question:   Diet:     Answer:   Consistent CHO (Diabetic) [4]     Order Specific Question:   Fluid modifications: (optional)     Answer:   1000 ml Fluid Restriction [7]       ACTIVITY  As tolerated. Fall precautions    CONSULTATIONS  None    PROCEDURES  NOne    LABORATORY  Lab Results   Component Value Date    SODIUM 138 08/18/2021    POTASSIUM 4.0 08/18/2021    CHLORIDE 104 08/18/2021    CO2 25 08/18/2021    GLUCOSE 162 (H) 08/18/2021    BUN 24 (H) 08/18/2021    CREATININE 0.91 08/18/2021        Lab Results   Component Value Date    WBC 6.8 08/17/2021    HEMOGLOBIN 13.0 (L) 08/17/2021    HEMATOCRIT 41.7 (L) 08/17/2021    PLATELETCT 216 08/17/2021        Total time of the discharge process exceeds 32 minutes.

## 2021-08-19 NOTE — THERAPY
Physical Therapy   Initial Evaluation     Patient Name: Dinah Mathur  Age:  65 y.o., Sex:  male  Medical Record #: [id number]  Today's Date: 8/19/2021          Assessment  Patient is 65 y.o. male w/ hx of Parkinson's, Type 2 diabetes, demential.  Admitted w/ c/o cough.  COVID positive.  He lives w/ his brother in a single story dwelling.  He is mobile PTA w/ a fww.  He reports that his brother assists getting his legs onto the bed in the evening.  Today, he presents essentially at his PLOF.  He is able to sit eob w/o assist.  Able to stand and ambulate 100 ft w/ a fww w/o loss of balance.  Min assist to elevate his LE's back onto the bed.  No acute PT needs.  PT for d/c needs.    Plan    Recommend Physical Therapy for Evaluation only  DC Equipment Recommendations: None  Discharge Recommendations: Anticipate that the patient will have no further physical therapy needs after discharge from the hospital         Objective       08/19/21 0726   Prior Living Situation   Housing / Facility 1 Story House   Steps Into Home 0   Steps In Home 0   Equipment Owned Front-Wheel Walker   Lives with - Patient's Self Care Capacity   (brother)   Prior Level of Functional Mobility   Bed Mobility Required Assist   Transfer Status Required Assist   Ambulation Independent   Assistive Devices Used Front-Wheel Walker   Balance Assessment   Sitting Balance (Static) Fair +   Sitting Balance (Dynamic) Fair +   Standing Balance (Static) Fair   Standing Balance (Dynamic) Fair   Weight Shift Sitting Good   Weight Shift Standing Good   Comments w/ fww   Gait Analysis   Gait Level Of Assist Supervised   Assistive Device Front Wheel Walker   Distance (Feet) 100   Deviation Bradykinetic   Bed Mobility    Supine to Sit Supervised   Sit to Supine Minimal Assist   Functional Mobility   Sit to Stand Supervised   Anticipated Discharge Equipment and Recommendations   DC Equipment Recommendations None   Discharge Recommendations Anticipate that the patient  will have no further physical therapy needs after discharge from the hospital

## 2021-08-19 NOTE — CARE PLAN
The patient is Stable - Low risk of patient condition declining or worsening    Shift Goals  Clinical Goals: rest/comfort   Patient Goals: to go home       Progress made toward(s) clinical / shift goals: rest promoted. Repositioned as needed. Patient able to sleep throughout shift.     Patient is not progressing towards the following goals:

## 2021-08-19 NOTE — PROGRESS NOTES
Assumed care of pt at 0700 from the night shift nurse. Pt is A&Ox4, he denies any pain and discomfort. Pt assessed, he is on isolation for COVID. Pt worked with Pt this morning and ambulated with a fww with minimal assistance. Home O2 eval was also accomplished and pt had saturations of 96% on room air with ambulation. Physician notified. Pt is medically clear and good to discharge. Pt's brother contacted for a ride home.

## 2021-08-19 NOTE — DISCHARGE PLANNING
Anticipated Discharge Disposition: Home with HH    Action: LSW confirmed with Alley DWYER that Pt can resume service without new HH order.     Barriers to Discharge: None    Plan: Pt has d/c orders/summary. LSW to remain available to medical team for any d/c needs.

## 2021-08-22 LAB
BACTERIA BLD CULT: NORMAL
SIGNIFICANT IND 70042: NORMAL
SITE SITE: NORMAL
SOURCE SOURCE: NORMAL

## 2021-08-23 LAB
BACTERIA BLD CULT: NORMAL
SIGNIFICANT IND 70042: NORMAL
SITE SITE: NORMAL
SOURCE SOURCE: NORMAL

## 2021-09-10 ENCOUNTER — PHARMACY VISIT (OUTPATIENT)
Dept: PHARMACY | Facility: MEDICAL CENTER | Age: 66
End: 2021-09-10
Payer: COMMERCIAL

## 2021-09-10 PROCEDURE — RXMED WILLOW AMBULATORY MEDICATION CHARGE: Performed by: FAMILY MEDICINE

## 2021-10-01 RX ORDER — GABAPENTIN 600 MG/1
TABLET ORAL
Qty: 90 TABLET | Refills: 3 | Status: CANCELLED | OUTPATIENT
Start: 2021-10-01

## 2021-12-09 NOTE — THERAPY
Occupational Therapy  Daily Treatment     Patient Name: Dinah Mathur  Age:  65 y.o., Sex:  male  Medical Record #: [id number]  Today's Date: 4/27/2021     Precautions  Precautions: Fall Risk  Comments: BLE pain    Assessment    Pt is at a supv level for basic self care, functional mobility, functional transfers w/ use of FWW and shower chair.    Plan    Patient will not be actively followed for occupational therapy services at this time, however may be seen if requested by physician for 1 more visit within 30 days to address any discharge or equipment needs.       DC Equipment Recommendations: Front-Wheel Walker, Tub / Shower Seat  Discharge Recommendations: Recommend home health for continued occupational therapy services    Subjective    Pleasant and cooperative     Objective       04/27/21 1433   Cognition    Cognition / Consciousness X   Speech/ Communication Delayed Responses   Level of Consciousness Alert   Comments pleasant and cooperative   Balance   Sitting Balance (Static) Fair +   Sitting Balance (Dynamic) Fair   Standing Balance (Static) Fair   Standing Balance (Dynamic) Fair   Weight Shift Sitting Fair   Weight Shift Standing Fair   Comments w/ fww   Bed Mobility    Supine to Sit Supervised   Scooting Supervised   Activities of Daily Living   Grooming Supervision;Standing  (oral care, wash hands, comb hair)   Bathing Supervision  (seated/standing shower)   Upper Body Dressing Supervision   Lower Body Dressing Supervision  (don/doff pajama pants, don/doff slippers)   Functional Mobility   Sit to Stand Supervised   Bed, Chair, Wheelchair Transfer Supervised   Tub / Shower Transfers Supervised   Mobility EOB>BR>sink>EOB   Comments cues for hand placement w/ shower   Patient / Family Goals   Patient / Family Goal #1 none stated   Short Term Goals   Short Term Goal # 1 pt will dress LB with supv    Goal Outcome # 1 Goal met          [Negative] : Heme/Lymph

## 2022-03-28 RX ORDER — GABAPENTIN 600 MG/1
TABLET ORAL
Qty: 90 TABLET | Refills: 3 | OUTPATIENT
Start: 2022-03-28

## 2022-05-31 PROCEDURE — RXMED WILLOW AMBULATORY MEDICATION CHARGE: Performed by: FAMILY MEDICINE

## 2022-06-01 ENCOUNTER — PHARMACY VISIT (OUTPATIENT)
Dept: PHARMACY | Facility: MEDICAL CENTER | Age: 67
End: 2022-06-01
Payer: COMMERCIAL

## 2022-06-30 ENCOUNTER — HOSPITAL ENCOUNTER (EMERGENCY)
Facility: MEDICAL CENTER | Age: 67
End: 2022-07-01
Attending: EMERGENCY MEDICINE
Payer: COMMERCIAL

## 2022-06-30 DIAGNOSIS — T14.8XXA ABRASION: ICD-10-CM
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
## 2022-06-30 PROCEDURE — 36415 COLL VENOUS BLD VENIPUNCTURE: CPT

## 2022-06-30 PROCEDURE — 99284 EMERGENCY DEPT VISIT MOD MDM: CPT

## 2022-06-30 ASSESSMENT — FIBROSIS 4 INDEX: FIB4 SCORE: 2.43

## 2022-07-01 VITALS
WEIGHT: 160 LBS | HEART RATE: 55 BPM | RESPIRATION RATE: 16 BRPM | TEMPERATURE: 97.4 F | SYSTOLIC BLOOD PRESSURE: 128 MMHG | BODY MASS INDEX: 22.9 KG/M2 | HEIGHT: 70 IN | OXYGEN SATURATION: 98 % | DIASTOLIC BLOOD PRESSURE: 70 MMHG

## 2022-07-01 LAB
ALBUMIN SERPL BCP-MCNC: 3.6 G/DL (ref 3.2–4.9)
ALBUMIN/GLOB SERPL: 1.1 G/DL
ALP SERPL-CCNC: 110 U/L (ref 30–99)
ALT SERPL-CCNC: 6 U/L (ref 2–50)
ANION GAP SERPL CALC-SCNC: 8 MMOL/L (ref 7–16)
APPEARANCE UR: CLEAR
AST SERPL-CCNC: 22 U/L (ref 12–45)
BACTERIA #/AREA URNS HPF: NEGATIVE /HPF
BASOPHILS # BLD AUTO: 0.8 % (ref 0–1.8)
BASOPHILS # BLD: 0.06 K/UL (ref 0–0.12)
BILIRUB SERPL-MCNC: 0.3 MG/DL (ref 0.1–1.5)
BILIRUB UR QL STRIP.AUTO: NEGATIVE
BUN SERPL-MCNC: 21 MG/DL (ref 8–22)
CALCIUM SERPL-MCNC: 8.8 MG/DL (ref 8.5–10.5)
CHLORIDE SERPL-SCNC: 105 MMOL/L (ref 96–112)
CO2 SERPL-SCNC: 28 MMOL/L (ref 20–33)
COLOR UR: YELLOW
CREAT SERPL-MCNC: 1 MG/DL (ref 0.5–1.4)
EOSINOPHIL # BLD AUTO: 0.39 K/UL (ref 0–0.51)
EOSINOPHIL NFR BLD: 5 % (ref 0–6.9)
EPI CELLS #/AREA URNS HPF: NEGATIVE /HPF
ERYTHROCYTE [DISTWIDTH] IN BLOOD BY AUTOMATED COUNT: 45.1 FL (ref 35.9–50)
GFR SERPLBLD CREATININE-BSD FMLA CKD-EPI: 83 ML/MIN/1.73 M 2
GLOBULIN SER CALC-MCNC: 3.2 G/DL (ref 1.9–3.5)
GLUCOSE SERPL-MCNC: 97 MG/DL (ref 65–99)
GLUCOSE UR STRIP.AUTO-MCNC: NEGATIVE MG/DL
HCT VFR BLD AUTO: 39.2 % (ref 42–52)
HGB BLD-MCNC: 13.2 G/DL (ref 14–18)
HYALINE CASTS #/AREA URNS LPF: ABNORMAL /LPF
IMM GRANULOCYTES # BLD AUTO: 0.01 K/UL (ref 0–0.11)
IMM GRANULOCYTES NFR BLD AUTO: 0.1 % (ref 0–0.9)
KETONES UR STRIP.AUTO-MCNC: NEGATIVE MG/DL
LEUKOCYTE ESTERASE UR QL STRIP.AUTO: NEGATIVE
LYMPHOCYTES # BLD AUTO: 2.12 K/UL (ref 1–4.8)
LYMPHOCYTES NFR BLD: 27 % (ref 22–41)
MCH RBC QN AUTO: 29.6 PG (ref 27–33)
MCHC RBC AUTO-ENTMCNC: 33.7 G/DL (ref 33.7–35.3)
MCV RBC AUTO: 87.9 FL (ref 81.4–97.8)
MICRO URNS: ABNORMAL
MONOCYTES # BLD AUTO: 0.43 K/UL (ref 0–0.85)
MONOCYTES NFR BLD AUTO: 5.5 % (ref 0–13.4)
NEUTROPHILS # BLD AUTO: 4.84 K/UL (ref 1.82–7.42)
NEUTROPHILS NFR BLD: 61.6 % (ref 44–72)
NITRITE UR QL STRIP.AUTO: NEGATIVE
NRBC # BLD AUTO: 0 K/UL
NRBC BLD-RTO: 0 /100 WBC
PH UR STRIP.AUTO: 7.5 [PH] (ref 5–8)
PLATELET # BLD AUTO: 192 K/UL (ref 164–446)
PMV BLD AUTO: 8.9 FL (ref 9–12.9)
POTASSIUM SERPL-SCNC: 3.6 MMOL/L (ref 3.6–5.5)
PROT SERPL-MCNC: 6.8 G/DL (ref 6–8.2)
PROT UR QL STRIP: 30 MG/DL
RBC # BLD AUTO: 4.46 M/UL (ref 4.7–6.1)
RBC # URNS HPF: ABNORMAL /HPF
RBC UR QL AUTO: NEGATIVE
SODIUM SERPL-SCNC: 141 MMOL/L (ref 135–145)
SP GR UR STRIP.AUTO: 1.01
UROBILINOGEN UR STRIP.AUTO-MCNC: 1 MG/DL
WBC # BLD AUTO: 7.9 K/UL (ref 4.8–10.8)
WBC #/AREA URNS HPF: ABNORMAL /HPF

## 2022-07-01 PROCEDURE — 80053 COMPREHEN METABOLIC PANEL: CPT

## 2022-07-01 PROCEDURE — 85025 COMPLETE CBC W/AUTO DIFF WBC: CPT

## 2022-07-01 PROCEDURE — 81001 URINALYSIS AUTO W/SCOPE: CPT

## 2022-07-01 NOTE — ED NOTES
I attempted to contact the group home with the number our  provided me, but they didn't answer and their voicemail has not been set up.

## 2022-07-01 NOTE — DISCHARGE PLANNING
Medical Social Work     The RN requested SHEREEN assistance with [facility] transport back to there group home. The pt lives at [facility] Care: [address]     SHEREEN called [facility] and obtained a transport authorization from [facility].     SHEREEN faxed a PCS form to [facility] and set up transport for 0300. RN aware of transport time.

## 2022-07-01 NOTE — DISCHARGE INSTRUCTIONS
Return the emergency department if you have increasing weakness, numbness, tingling or severe headache.

## 2022-07-01 NOTE — ED TRIAGE NOTES
Chief Complaint   Patient presents with   • GLF     GLF x2 today. He denies hitting his head. His only injury appears to be to his L elbow. Has an abrasion and hematoma.      Pt sent by his group home for 2 GLFs today. They were mechanical falls, he didn't hit his head. He says he just has trouble getting around. He has BLE swelling, but according to group home they are at baseline. He is GCS 15, A&Ox4, but has a POA.

## 2022-07-01 NOTE — ED NOTES
Pt used urinal to provide urine sample. UA sent to lab. Voices no further needs or concerns at this time.

## 2022-07-01 NOTE — ED NOTES
Attempted to call pt's group home to let them know he is being discharged. They didn't send facility contact information with the pt so I called a few different numbers found on google, but they all went to voicemail.

## 2022-07-01 NOTE — ED PROVIDER NOTES
ED Provider Note    Scribed for Walt Jewell M.D. by Avelina Torres. 6/30/2022, 11:57 PM.    Primary care provider: Pcp Pt States None  Means of arrival: EMS  History obtained from: Patient  History limited by: None    CHIEF COMPLAINT  Chief Complaint   Patient presents with   • GLF     GLF x2 today. He denies hitting his head. His only injury appears to be to his L elbow. Has an abrasion and hematoma.        HPI  Dinah Mathur is a 66 y.o. male with history of Parkinson's Disease who presents to the Emergency Department via EMS for ground level fall onset prior to arrival. The patient states he tripped on something and fell. He notes symptoms of hand pain. He denies head trauma or loss of consciousness. He denies elbow pain, shoulder pain, or neck pain. He notes he had a fever has since resolved. Per brother, the group home called him and said that the patient tossed himself out of bed.     REVIEW OF SYSTEMS  Pertinent positives include hand pain. Pertinent negatives include elbow pain, shoulder pain, or neck pain.     PAST MEDICAL HISTORY   has a past medical history of Diabetes (HCC), Parkinson's disease (HCC) (04/25/2021), and Parkinson's disease (HCC).    SURGICAL HISTORY  patient denies any surgical history    SOCIAL HISTORY  Social History     Tobacco Use   • Smoking status: Never Smoker   • Smokeless tobacco: Never Used   Substance Use Topics   • Alcohol use: Never   • Drug use: Never      Social History     Substance and Sexual Activity   Drug Use Never       FAMILY HISTORY  No family history on file.    CURRENT MEDICATIONS  Home Medications     Reviewed by Sue Marion R.N. (Registered Nurse) on 06/30/22 at 2315  Med List Status: Partial   Medication Last Dose Status   amitriptyline (ELAVIL) 25 MG Tab  Active   amitriptyline (ELAVIL) 25 MG Tab  Active   aspirin (ASPIRIN LOW DOSE) 81 MG EC tablet  Active   carbidopa-levodopa (SINEMET)  MG Tab  Active   carbidopa-levodopa  "(SINEMET)  MG Tab  Active   gabapentin (NEURONTIN) 600 MG tablet  Active   gabapentin (NEURONTIN) 600 MG tablet  Active   metFORMIN (GLUCOPHAGE) 500 MG Tab  Active   metFORMIN (GLUCOPHAGE) 500 MG Tab  Active   metFORMIN (GLUCOPHAGE) 500 MG Tab  Active   rosuvastatin (CRESTOR) 40 MG tablet  Active   rosuvastatin (CRESTOR) 40 MG tablet  Active   rosuvastatin (CRESTOR) 40 MG tablet  Active   tamsulosin (FLOMAX) 0.4 MG capsule  Active   tamsulosin (FLOMAX) 0.4 MG capsule  Active   tamsulosin (FLOMAX) 0.4 MG capsule  Active                ALLERGIES  No Known Allergies    PHYSICAL EXAM  VITAL SIGNS: /60   Pulse (!) 56   Temp 36.6 °C (97.9 °F) (Temporal)   Resp 16   Ht 1.778 m (5' 10\")   Wt 72.6 kg (160 lb)   SpO2 99%   BMI 22.96 kg/m²     Pulse ox interpretation: I interpret this pulse ox as normal.  Constitutional: Alert in no apparent distress.  HENT: No vertebral point tenderness on the neck, Normocephalic, Atraumatic, Bilateral external ears normal. Nose normal.   Eyes: Pupils are equal and reactive. Conjunctiva normal, non-icteric.   Heart: Regular rate and rhythm, no murmurs.    Lungs: Clear to auscultation bilaterally.  Skin: Warm, Dry, No erythema, No rash.   Neurologic: Alert, Grossly non-focal.   Extremities: Abrasion over the left lateral elbow, no bony tenderness  Psychiatric: Affect normal, Judgment normal, Mood normal, Appears appropriate and not intoxicated.    LABS  Results for orders placed or performed during the hospital encounter of 06/30/22   CBC WITH DIFFERENTIAL   Result Value Ref Range    WBC 7.9 4.8 - 10.8 K/uL    RBC 4.46 (L) 4.70 - 6.10 M/uL    Hemoglobin 13.2 (L) 14.0 - 18.0 g/dL    Hematocrit 39.2 (L) 42.0 - 52.0 %    MCV 87.9 81.4 - 97.8 fL    MCH 29.6 27.0 - 33.0 pg    MCHC 33.7 33.7 - 35.3 g/dL    RDW 45.1 35.9 - 50.0 fL    Platelet Count 192 164 - 446 K/uL    MPV 8.9 (L) 9.0 - 12.9 fL    Neutrophils-Polys 61.60 44.00 - 72.00 %    Lymphocytes 27.00 22.00 - 41.00 %    " Monocytes 5.50 0.00 - 13.40 %    Eosinophils 5.00 0.00 - 6.90 %    Basophils 0.80 0.00 - 1.80 %    Immature Granulocytes 0.10 0.00 - 0.90 %    Nucleated RBC 0.00 /100 WBC    Neutrophils (Absolute) 4.84 1.82 - 7.42 K/uL    Lymphs (Absolute) 2.12 1.00 - 4.80 K/uL    Monos (Absolute) 0.43 0.00 - 0.85 K/uL    Eos (Absolute) 0.39 0.00 - 0.51 K/uL    Baso (Absolute) 0.06 0.00 - 0.12 K/uL    Immature Granulocytes (abs) 0.01 0.00 - 0.11 K/uL    NRBC (Absolute) 0.00 K/uL   COMP METABOLIC PANEL   Result Value Ref Range    Sodium 141 135 - 145 mmol/L    Potassium 3.6 3.6 - 5.5 mmol/L    Chloride 105 96 - 112 mmol/L    Co2 28 20 - 33 mmol/L    Anion Gap 8.0 7.0 - 16.0    Glucose 97 65 - 99 mg/dL    Bun 21 8 - 22 mg/dL    Creatinine 1.00 0.50 - 1.40 mg/dL    Calcium 8.8 8.5 - 10.5 mg/dL    AST(SGOT) 22 12 - 45 U/L    ALT(SGPT) 6 2 - 50 U/L    Alkaline Phosphatase 110 (H) 30 - 99 U/L    Total Bilirubin 0.3 0.1 - 1.5 mg/dL    Albumin 3.6 3.2 - 4.9 g/dL    Total Protein 6.8 6.0 - 8.2 g/dL    Globulin 3.2 1.9 - 3.5 g/dL    A-G Ratio 1.1 g/dL   URINALYSIS (UA)    Specimen: Urine   Result Value Ref Range    Color Yellow     Character Clear     Specific Gravity 1.010 <1.035    Ph 7.5 5.0 - 8.0    Glucose Negative Negative mg/dL    Ketones Negative Negative mg/dL    Protein 30 (A) Negative mg/dL    Bilirubin Negative Negative    Urobilinogen, Urine 1.0 Negative    Nitrite Negative Negative    Leukocyte Esterase Negative Negative    Occult Blood Negative Negative    Micro Urine Req Microscopic    URINE MICROSCOPIC (W/UA)   Result Value Ref Range    WBC 0-2 (A) /hpf    RBC 0-2 (A) /hpf    Bacteria Negative None /hpf    Epithelial Cells Negative /hpf    Hyaline Cast 0-2 /lpf   ESTIMATED GFR   Result Value Ref Range    GFR (CKD-EPI) 83 >60 mL/min/1.73 m 2     All labs reviewed by me.    COURSE & MEDICAL DECISION MAKING  Nursing notes, VS, PMSFHx reviewed in chart.    11:57 PM - Patient seen and examined at bedside. Ordered UA, CMP, and CBC  w/ diff to evaluate his symptoms.     Medical Decision Making: Patient presents after having a fall out of bed.  At this point time I just see an abrasion to his elbow and no other signs of injury.  Patient will be discharged back to the group home I do not find any signs of injury.     The patient will return for new or worsening symptoms and is stable at the time of discharge.    The patient is referred to a primary physician for blood pressure management, diabetic screening, and for all other preventative health concerns.        DISPOSITION:  Patient will be discharged home in stable condition.    FOLLOW UP:  No follow-up provider specified.    OUTPATIENT MEDICATIONS:  New Prescriptions    No medications on file         FINAL IMPRESSION  1. Fall, initial encounter          Avelina CESAR (Blakeibfreddy), am scribing for, and in the presence of, Walt Jewell M.D.    Electronically signed by: Avelina Torres (Blakeibfreddy), 6/30/2022    Walt CESAR M.D. personally performed the services described in this documentation, as scribed by Avelina Torres in my presence, and it is both accurate and complete.    The note accurately reflects work and decisions made by me.  Walt Jewell M.D.  7/1/2022  1:42 AM

## 2022-08-10 ENCOUNTER — APPOINTMENT (OUTPATIENT)
Dept: RADIOLOGY | Facility: MEDICAL CENTER | Age: 67
End: 2022-08-10
Attending: EMERGENCY MEDICINE
Payer: COMMERCIAL

## 2022-08-10 ENCOUNTER — HOSPITAL ENCOUNTER (EMERGENCY)
Facility: MEDICAL CENTER | Age: 67
End: 2022-08-10
Attending: EMERGENCY MEDICINE
Payer: COMMERCIAL

## 2022-08-10 VITALS
HEIGHT: 69 IN | OXYGEN SATURATION: 97 % | SYSTOLIC BLOOD PRESSURE: 143 MMHG | HEART RATE: 75 BPM | DIASTOLIC BLOOD PRESSURE: 77 MMHG | BODY MASS INDEX: 23.7 KG/M2 | TEMPERATURE: 97.6 F | WEIGHT: 160 LBS | RESPIRATION RATE: 18 BRPM

## 2022-08-10 DIAGNOSIS — S43.402A SPRAIN OF LEFT SHOULDER, UNSPECIFIED SHOULDER SPRAIN TYPE, INITIAL ENCOUNTER: ICD-10-CM

## 2022-08-10 DIAGNOSIS — S22.31XA CLOSED FRACTURE OF ONE RIB OF RIGHT SIDE, INITIAL ENCOUNTER: ICD-10-CM

## 2022-08-10 PROCEDURE — 71101 X-RAY EXAM UNILAT RIBS/CHEST: CPT | Mod: RT

## 2022-08-10 PROCEDURE — 99284 EMERGENCY DEPT VISIT MOD MDM: CPT

## 2022-08-10 PROCEDURE — 73030 X-RAY EXAM OF SHOULDER: CPT | Mod: LT

## 2022-08-10 ASSESSMENT — FIBROSIS 4 INDEX: FIB4 SCORE: 3.09

## 2022-08-10 ASSESSMENT — LIFESTYLE VARIABLES: DO YOU DRINK ALCOHOL: NO

## 2022-08-10 NOTE — ED NOTES
Pt BIB EMS from [facility].  Pt had GLF 2 days ago.  Unsure it he hit his head, takes ASA daily.  Pt c/o pain to left shoulder right ribs, bruising to bilateral knees.  Pt A/Ox3 - baseline.  Pt has been ambulatory with walker.  .  ERP to see.

## 2022-08-11 NOTE — DISCHARGE PLANNING
RN has notified SW that Pt is medically cleared and can return to his .     Pt resides at [address].     [facility] authorization arranged with Valerie.  PCS completed and faxed to [facility]  Transportation time arranged for 2100    RN has been updated.     SW called  ([phone])and spoke to Owner [facility]. She has been made aware that Pt would be returning back to the .

## 2022-08-11 NOTE — ED NOTES
EMS @ bedside to transport pt back to care facility. All belongings with pt. NAD. VSS. Respirations equal and unlabored

## 2022-08-11 NOTE — ED PROVIDER NOTES
ED Provider Note    CHIEF COMPLAINT  Chief Complaint   Patient presents with    T-5000 GLF    Shoulder Pain     left    Rib Pain     right       HPI  Dinah Mathur is a 66 y.o. male who presents after a fall 2 days ago with persistent right chest wall pain and left shoulder pain for evaluation.  He denies shortness of breath.  Left elbow worse with movement.  No abdominal pain.  Patient denies head or neck injury.  No lower extremity pain.  Patient states he was trying to push a wheelchair when it slipped out from under him causing him to fall forward landing on his chest.  He also states he is prone to falling secondary to his Parkinson's disease      REVIEW OF SYSTEMS  Ear nose throat: No facial pain  Respiratory: No shortness of breath.  Mild at right chest pain with deep breath.  Gastrointestinal: No abdominal pain  Musculoskeletal: Rib pain, left shoulder pain  Neurologic: Denies head injury  Skin: No laceration      PAST MEDICAL HISTORY  Past Medical History:   Diagnosis Date    Diabetes (HCC)     Parkinson's disease (HCC) 04/25/2021    per pt, diagnosed when he was in [place]    Parkinson's disease ([facility])        FAMILY HISTORY  History reviewed. No pertinent family history.    SOCIAL HISTORY  Social History     Socioeconomic History    Marital status: Single   Tobacco Use    Smoking status: Never    Smokeless tobacco: Never   Substance and Sexual Activity    Alcohol use: Never    Drug use: Never       SURGICAL HISTORY  History reviewed. No pertinent surgical history.    CURRENT MEDICATIONS  No current facility-administered medications on file prior to encounter.     Current Outpatient Medications on File Prior to Encounter   Medication Sig Dispense Refill    polyethylene glycol 3350 (SMOOTH LAX) 17 GM/SCOOP Powder Dissolve 17 g in water of juice daily for 7 days. Discontinue with regular BMs. 238 g 0    amitriptyline (ELAVIL) 25 MG Tab Take 1 Tablet by mouth at bedtime. 90 Tablet 3    aspirin (ASPIRIN LOW  "DOSE) 81 MG EC tablet Take 1 Tablet by mouth every day. 90 Tablet 3    gabapentin (NEURONTIN) 600 MG tablet Take 1 Tablet by mouth 3 times a day for 90 days. 90 Tablet 2    metFORMIN (GLUCOPHAGE) 500 MG Tab Take 1 Tablet by mouth 2 times a day. 180 Tablet 3    rosuvastatin (CRESTOR) 40 MG tablet Take 1 Tablet by mouth every day. 90 Tablet 3    carbidopa-levodopa (SINEMET)  MG Tab Take 1 Tablet by mouth 4 times a day. 360 Tablet 3    tamsulosin (FLOMAX) 0.4 MG capsule Take 1 Capsule by mouth every day. 90 Capsule 3    gabapentin (NEURONTIN) 600 MG tablet Take 600 mg by mouth 3 times a day.      metFORMIN (GLUCOPHAGE) 500 MG Tab Take 500 mg by mouth 2 times a day.      rosuvastatin (CRESTOR) 40 MG tablet Take 40 mg by mouth every day.      tamsulosin (FLOMAX) 0.4 MG capsule Take 0.4 mg by mouth 1/2 hour after breakfast.      amitriptyline (ELAVIL) 25 MG Tab Take 1 tablet by mouth every day at bedtime 90 tablet 3    metFORMIN (GLUCOPHAGE) 500 MG Tab Take 1 tablet by mouth twice daily (Patient taking differently: Take 500 mg by mouth 2 times a day.) 180 tablet 3    rosuvastatin (CRESTOR) 40 MG tablet Take 1 tablet by mouth once daily (Patient taking differently: Take 40 mg by mouth every day.) 90 tablet 3    carbidopa-levodopa (SINEMET)  MG Tab Take 1 tablet by mouth 4 times a day 360 tablet 3    tamsulosin (FLOMAX) 0.4 MG capsule Take 1 capsule by mouth once daily (Patient taking differently: Take 0.4 mg by mouth every day.) 90 capsule 3       ALLERGIES  No Known Allergies    PHYSICAL EXAM  VITAL SIGNS: /62   Pulse 67   Temp 36.4 °C (97.5 °F) (Temporal)   Resp 17   Ht 1.753 m (5' 9\")   Wt 72.6 kg (160 lb)   SpO2 98%   BMI 23.63 kg/m²    Constitutional: Well-nourished, no distress  HENT: Face is atraumatic, cranium nontender  Eyes: Pupils are equal 3 millimeters, Conjunctiva normal, No discharge.   Neck: Nontender to palpation  Cardiovascular: Normal heart rate, Normal rhythm   Pulmonary: Equal  " breath sounds, No wheezing or rales.  Good bilateral air movement  GI: Soft and nontender  Skin: No laceration, no abrasion.  Knee bruising.  Vascular: Normal capillary refill all extremities  Musculoskeletal: Left shoulder is tender, no crepitance or deformity.  Left clavicle and left scapular nontender.  Left elbow nontender.  Other extremities nontender.  Right anterior and right lateral ribs are tender to palpation without crepitance or deformity.  Spine nontender.  Leg range of motion intact, no knee tenderness.  Neurologic: Speech is slightly mumbled secondary to his Parkinson's disease.  He has equal strength in hands and legs.  Sensation grossly intact    RADIOLOGY/PROCEDURES  DX-SHOULDER 2+ LEFT   Final Result      1.  Moderate degenerative change of LEFT shoulder with high riding humeral head suggesting rotator cuff deficiency.   2.  No fracture or dislocation.      [id number] (WITH 1-VIEW CXR) RIGHT   Final Result      1.  Possible minimally displaced lateral RIGHT 8th rib fracture.   2.  No pleural fluid or pneumothorax.            COURSE & MEDICAL DECISION MAKING  Pertinent Labs & Imaging studies reviewed. (See chart for details)  With fall 2 days ago, persistent pain and therefore sent to the emergency department for evaluation.  X-ray reveals nondisplaced right eighth rib fracture explaining his pain there.  He is not hypoxic, stable for discharge.  Left shoulder shows possible rotator cuff injury given orientation, there is no fracture or dislocation.  Patient is provided arm sling and referred to orthopedics.  I have advised Motrin or Tylenol for pain control, patient stable for discharge.    FINAL IMPRESSION     1. Sprain of left shoulder, unspecified shoulder sprain type, initial encounter        2. Closed fracture of one rib of right side, initial encounter      8                Electronically signed by: Thierno Art M.D., 8/10/2022

## 2022-08-11 NOTE — DISCHARGE INSTRUCTIONS
See your doctor for recheck if no improvement in 1 week, return if worse or for any concerns    Use sling for left shoulder comfort.  Follow-up with orthopedic doctor in 1 to 2 weeks if no improvement.

## 2023-02-28 PROBLEM — R74.8 ELEVATED ALKALINE PHOSPHATASE LEVEL: Status: ACTIVE | Noted: 2023-02-28

## 2023-02-28 PROBLEM — R53.1 WEAKNESS: Status: ACTIVE | Noted: 2023-02-28

## 2023-02-28 PROBLEM — H61.20 CERUMEN IMPACTION: Status: ACTIVE | Noted: 2023-02-28

## 2023-02-28 PROBLEM — D64.9 ANEMIA: Status: ACTIVE | Noted: 2023-02-28

## 2023-02-28 PROBLEM — J12.82 PNEUMONIA DUE TO COVID-19 VIRUS: Status: RESOLVED | Noted: 2021-08-17 | Resolved: 2023-02-28

## 2023-02-28 PROBLEM — U07.1 PNEUMONIA DUE TO COVID-19 VIRUS: Status: RESOLVED | Noted: 2021-08-17 | Resolved: 2023-02-28

## 2023-03-06 ENCOUNTER — HOSPITAL ENCOUNTER (OUTPATIENT)
Facility: MEDICAL CENTER | Age: 68
End: 2023-03-06
Payer: COMMERCIAL

## 2023-03-06 LAB
BASOPHILS # BLD AUTO: 1 % (ref 0–1.8)
BASOPHILS # BLD: 0.06 K/UL (ref 0–0.12)
EOSINOPHIL # BLD AUTO: 0.22 K/UL (ref 0–0.51)
EOSINOPHIL NFR BLD: 3.5 % (ref 0–6.9)
ERYTHROCYTE [DISTWIDTH] IN BLOOD BY AUTOMATED COUNT: 43.8 FL (ref 35.9–50)
HCT VFR BLD AUTO: 36.8 % (ref 42–52)
HGB BLD-MCNC: 12 G/DL (ref 14–18)
IMM GRANULOCYTES # BLD AUTO: 0.02 K/UL (ref 0–0.11)
IMM GRANULOCYTES NFR BLD AUTO: 0.3 % (ref 0–0.9)
LYMPHOCYTES # BLD AUTO: 2.03 K/UL (ref 1–4.8)
LYMPHOCYTES NFR BLD: 32.4 % (ref 22–41)
MCH RBC QN AUTO: 30.2 PG (ref 27–33)
MCHC RBC AUTO-ENTMCNC: 32.6 G/DL (ref 33.7–35.3)
MCV RBC AUTO: 92.5 FL (ref 81.4–97.8)
MONOCYTES # BLD AUTO: 0.34 K/UL (ref 0–0.85)
MONOCYTES NFR BLD AUTO: 5.4 % (ref 0–13.4)
NEUTROPHILS # BLD AUTO: 3.6 K/UL (ref 1.82–7.42)
NEUTROPHILS NFR BLD: 57.4 % (ref 44–72)
NRBC # BLD AUTO: 0 K/UL
NRBC BLD-RTO: 0 /100 WBC
PLATELET # BLD AUTO: 160 K/UL (ref 164–446)
PMV BLD AUTO: 10.1 FL (ref 9–12.9)
RBC # BLD AUTO: 3.98 M/UL (ref 4.7–6.1)
WBC # BLD AUTO: 6.3 K/UL (ref 4.8–10.8)

## 2023-03-06 PROCEDURE — 85025 COMPLETE CBC W/AUTO DIFF WBC: CPT

## 2023-03-06 PROCEDURE — 83036 HEMOGLOBIN GLYCOSYLATED A1C: CPT

## 2023-03-07 LAB
EST. AVERAGE GLUCOSE BLD GHB EST-MCNC: 123 MG/DL
HBA1C MFR BLD: 5.9 % (ref 4–5.6)

## 2023-03-14 PROBLEM — I95.9 HYPOTENSION: Status: ACTIVE | Noted: 2023-03-14

## 2023-03-14 PROBLEM — D69.6 THROMBOCYTOPENIA (HCC): Status: ACTIVE | Noted: 2023-03-14

## 2023-03-20 ENCOUNTER — HOSPITAL ENCOUNTER (OUTPATIENT)
Facility: MEDICAL CENTER | Age: 68
End: 2023-03-20
Payer: COMMERCIAL

## 2023-03-20 LAB
ANION GAP SERPL CALC-SCNC: 12 MMOL/L (ref 7–16)
BUN SERPL-MCNC: 12 MG/DL (ref 8–22)
CALCIUM SERPL-MCNC: 9.3 MG/DL (ref 8.5–10.5)
CHLORIDE SERPL-SCNC: 106 MMOL/L (ref 96–112)
CO2 SERPL-SCNC: 26 MMOL/L (ref 20–33)
CREAT SERPL-MCNC: 1.14 MG/DL (ref 0.5–1.4)
FASTING STATUS PATIENT QL REPORTED: NORMAL
GFR SERPLBLD CREATININE-BSD FMLA CKD-EPI: 70 ML/MIN/1.73 M 2
GLUCOSE SERPL-MCNC: 91 MG/DL (ref 65–99)
MAGNESIUM SERPL-MCNC: 2.5 MG/DL (ref 1.5–2.5)
POTASSIUM SERPL-SCNC: 3.1 MMOL/L (ref 3.6–5.5)
SODIUM SERPL-SCNC: 144 MMOL/L (ref 135–145)

## 2023-03-20 PROCEDURE — 83735 ASSAY OF MAGNESIUM: CPT

## 2023-03-20 PROCEDURE — 80048 BASIC METABOLIC PNL TOTAL CA: CPT

## 2023-04-10 ENCOUNTER — HOSPITAL ENCOUNTER (OUTPATIENT)
Facility: MEDICAL CENTER | Age: 68
End: 2023-04-10
Payer: COMMERCIAL

## 2023-04-10 LAB
ANION GAP SERPL CALC-SCNC: 13 MMOL/L (ref 7–16)
BUN SERPL-MCNC: 19 MG/DL (ref 8–22)
CALCIUM SERPL-MCNC: 9.4 MG/DL (ref 8.5–10.5)
CHLORIDE SERPL-SCNC: 106 MMOL/L (ref 96–112)
CO2 SERPL-SCNC: 23 MMOL/L (ref 20–33)
CREAT SERPL-MCNC: 0.94 MG/DL (ref 0.5–1.4)
FASTING STATUS PATIENT QL REPORTED: NORMAL
GFR SERPLBLD CREATININE-BSD FMLA CKD-EPI: 89 ML/MIN/1.73 M 2
GLUCOSE SERPL-MCNC: 91 MG/DL (ref 65–99)
POTASSIUM SERPL-SCNC: 4.2 MMOL/L (ref 3.6–5.5)
SODIUM SERPL-SCNC: 142 MMOL/L (ref 135–145)

## 2023-04-10 PROCEDURE — 80048 BASIC METABOLIC PNL TOTAL CA: CPT

## 2023-04-17 ENCOUNTER — HOSPITAL ENCOUNTER (OUTPATIENT)
Facility: MEDICAL CENTER | Age: 68
End: 2023-04-17
Payer: COMMERCIAL

## 2023-04-17 LAB
ANION GAP SERPL CALC-SCNC: 12 MMOL/L (ref 7–16)
BUN SERPL-MCNC: 17 MG/DL (ref 8–22)
CALCIUM SERPL-MCNC: 9.2 MG/DL (ref 8.5–10.5)
CHLORIDE SERPL-SCNC: 105 MMOL/L (ref 96–112)
CO2 SERPL-SCNC: 24 MMOL/L (ref 20–33)
CREAT SERPL-MCNC: 1.06 MG/DL (ref 0.5–1.4)
FASTING STATUS PATIENT QL REPORTED: NORMAL
GFR SERPLBLD CREATININE-BSD FMLA CKD-EPI: 77 ML/MIN/1.73 M 2
GLUCOSE SERPL-MCNC: 150 MG/DL (ref 65–99)
POTASSIUM SERPL-SCNC: 3.6 MMOL/L (ref 3.6–5.5)
SODIUM SERPL-SCNC: 141 MMOL/L (ref 135–145)

## 2023-04-17 PROCEDURE — 80048 BASIC METABOLIC PNL TOTAL CA: CPT

## 2023-04-27 PROBLEM — G62.9 NEUROPATHY: Status: ACTIVE | Noted: 2023-04-27

## 2024-01-13 NOTE — PROGRESS NOTES
Patient presented for acceptance to the [facility] long-term service. Chart reviewed. Appears appropriate. Will accept starting today. D/w NATE MCGOVERN.    No